# Patient Record
Sex: MALE | Race: WHITE | NOT HISPANIC OR LATINO
[De-identification: names, ages, dates, MRNs, and addresses within clinical notes are randomized per-mention and may not be internally consistent; named-entity substitution may affect disease eponyms.]

---

## 2018-04-12 ENCOUNTER — APPOINTMENT (OUTPATIENT)
Dept: UROLOGY | Facility: CLINIC | Age: 83
End: 2018-04-12
Payer: MEDICARE

## 2018-04-12 VITALS
BODY MASS INDEX: 29.95 KG/M2 | WEIGHT: 169 LBS | RESPIRATION RATE: 15 BRPM | SYSTOLIC BLOOD PRESSURE: 136 MMHG | HEART RATE: 71 BPM | DIASTOLIC BLOOD PRESSURE: 73 MMHG | HEIGHT: 63 IN

## 2018-04-12 DIAGNOSIS — R97.20 ELEVATED PROSTATE, SPECIFIC ANTIGEN [PSA]: ICD-10-CM

## 2018-04-12 PROCEDURE — 99204 OFFICE O/P NEW MOD 45 MIN: CPT

## 2018-04-12 RX ORDER — IRBESARTAN AND HYDROCHLOROTHIAZIDE 150; 12.5 MG/1; MG/1
150-12.5 TABLET, FILM COATED ORAL
Refills: 0 | Status: ACTIVE | COMMUNITY

## 2018-04-12 RX ORDER — TAMSULOSIN HYDROCHLORIDE 0.4 MG/1
0.4 CAPSULE ORAL
Qty: 90 | Refills: 3 | Status: DISCONTINUED | OUTPATIENT
Start: 2018-04-12 | End: 2018-04-12

## 2018-04-12 RX ORDER — ALFUZOSIN HYDROCHLORIDE 10 MG/1
10 TABLET, EXTENDED RELEASE ORAL
Qty: 90 | Refills: 3 | Status: ACTIVE | COMMUNITY
Start: 2018-04-12 | End: 1900-01-01

## 2018-04-12 RX ORDER — OMEPRAZOLE 40 MG/1
CAPSULE, DELAYED RELEASE ORAL
Refills: 0 | Status: ACTIVE | COMMUNITY

## 2018-04-13 PROBLEM — R97.20 PSA ELEVATION: Status: ACTIVE | Noted: 2018-04-13

## 2018-04-13 LAB
PSA FREE FLD-MCNC: 16.8
PSA FREE SERPL-MCNC: 3.98 NG/ML
PSA SERPL-MCNC: 23.74 NG/ML

## 2018-11-28 ENCOUNTER — TRANSCRIPTION ENCOUNTER (OUTPATIENT)
Age: 83
End: 2018-11-28

## 2018-11-29 ENCOUNTER — RESULT REVIEW (OUTPATIENT)
Age: 83
End: 2018-11-29

## 2018-11-29 ENCOUNTER — INPATIENT (INPATIENT)
Facility: HOSPITAL | Age: 83
LOS: 6 days | Discharge: NURSING, REHAB OR LONG-TERM CARE | DRG: 419 | End: 2018-12-06
Attending: SURGERY | Admitting: SURGERY
Payer: MEDICARE

## 2018-11-29 VITALS
SYSTOLIC BLOOD PRESSURE: 183 MMHG | DIASTOLIC BLOOD PRESSURE: 70 MMHG | HEART RATE: 80 BPM | TEMPERATURE: 98 F | OXYGEN SATURATION: 96 % | RESPIRATION RATE: 30 BRPM

## 2018-11-29 DIAGNOSIS — K81.9 CHOLECYSTITIS, UNSPECIFIED: ICD-10-CM

## 2018-11-29 DIAGNOSIS — E11.9 TYPE 2 DIABETES MELLITUS WITHOUT COMPLICATIONS: ICD-10-CM

## 2018-11-29 DIAGNOSIS — I10 ESSENTIAL (PRIMARY) HYPERTENSION: ICD-10-CM

## 2018-11-29 LAB
ALBUMIN SERPL ELPH-MCNC: 4.2 G/DL — SIGNIFICANT CHANGE UP (ref 3.3–5)
ALP SERPL-CCNC: 88 U/L — SIGNIFICANT CHANGE UP (ref 40–120)
ALT FLD-CCNC: 13 U/L — SIGNIFICANT CHANGE UP (ref 10–45)
ANION GAP SERPL CALC-SCNC: 16 MMOL/L — SIGNIFICANT CHANGE UP (ref 5–17)
APTT BLD: 30.9 SEC — SIGNIFICANT CHANGE UP (ref 27.5–36.3)
AST SERPL-CCNC: 12 U/L — SIGNIFICANT CHANGE UP (ref 10–40)
BASE EXCESS BLDV CALC-SCNC: -1.1 MMOL/L — SIGNIFICANT CHANGE UP (ref -2–2)
BASOPHILS # BLD AUTO: 0 K/UL — SIGNIFICANT CHANGE UP (ref 0–0.2)
BASOPHILS NFR BLD AUTO: 0.5 % — SIGNIFICANT CHANGE UP (ref 0–2)
BILIRUB SERPL-MCNC: 0.8 MG/DL — SIGNIFICANT CHANGE UP (ref 0.2–1.2)
BUN SERPL-MCNC: 23 MG/DL — SIGNIFICANT CHANGE UP (ref 7–23)
CA-I SERPL-SCNC: 1.2 MMOL/L — SIGNIFICANT CHANGE UP (ref 1.12–1.3)
CALCIUM SERPL-MCNC: 9.7 MG/DL — SIGNIFICANT CHANGE UP (ref 8.4–10.5)
CHLORIDE BLDV-SCNC: 110 MMOL/L — HIGH (ref 96–108)
CHLORIDE SERPL-SCNC: 103 MMOL/L — SIGNIFICANT CHANGE UP (ref 96–108)
CO2 BLDV-SCNC: 24 MMOL/L — SIGNIFICANT CHANGE UP (ref 22–30)
CO2 SERPL-SCNC: 19 MMOL/L — LOW (ref 22–31)
CREAT SERPL-MCNC: 1.09 MG/DL — SIGNIFICANT CHANGE UP (ref 0.5–1.3)
EOSINOPHIL # BLD AUTO: 0.1 K/UL — SIGNIFICANT CHANGE UP (ref 0–0.5)
EOSINOPHIL NFR BLD AUTO: 0.6 % — SIGNIFICANT CHANGE UP (ref 0–6)
GAS PNL BLDV: 133 MMOL/L — LOW (ref 136–145)
GAS PNL BLDV: SIGNIFICANT CHANGE UP
GLUCOSE BLDC GLUCOMTR-MCNC: 180 MG/DL — HIGH (ref 70–99)
GLUCOSE BLDV-MCNC: 155 MG/DL — HIGH (ref 70–99)
GLUCOSE SERPL-MCNC: 148 MG/DL — HIGH (ref 70–99)
HCO3 BLDV-SCNC: 23 MMOL/L — SIGNIFICANT CHANGE UP (ref 21–29)
HCT VFR BLD CALC: 40.5 % — SIGNIFICANT CHANGE UP (ref 39–50)
HCT VFR BLDA CALC: 43 % — SIGNIFICANT CHANGE UP (ref 39–50)
HGB BLD CALC-MCNC: 14 G/DL — SIGNIFICANT CHANGE UP (ref 13–17)
HGB BLD-MCNC: 13.8 G/DL — SIGNIFICANT CHANGE UP (ref 13–17)
INR BLD: 0.97 RATIO — SIGNIFICANT CHANGE UP (ref 0.88–1.16)
LACTATE BLDV-MCNC: 1.9 MMOL/L — SIGNIFICANT CHANGE UP (ref 0.7–2)
LIDOCAIN IGE QN: 18 U/L — SIGNIFICANT CHANGE UP (ref 7–60)
LYMPHOCYTES # BLD AUTO: 1.4 K/UL — SIGNIFICANT CHANGE UP (ref 1–3.3)
LYMPHOCYTES # BLD AUTO: 15.6 % — SIGNIFICANT CHANGE UP (ref 13–44)
MCHC RBC-ENTMCNC: 28.1 PG — SIGNIFICANT CHANGE UP (ref 27–34)
MCHC RBC-ENTMCNC: 34.1 GM/DL — SIGNIFICANT CHANGE UP (ref 32–36)
MCV RBC AUTO: 82.6 FL — SIGNIFICANT CHANGE UP (ref 80–100)
MONOCYTES # BLD AUTO: 0.7 K/UL — SIGNIFICANT CHANGE UP (ref 0–0.9)
MONOCYTES NFR BLD AUTO: 7.5 % — SIGNIFICANT CHANGE UP (ref 2–14)
NEUTROPHILS # BLD AUTO: 6.6 K/UL — SIGNIFICANT CHANGE UP (ref 1.8–7.4)
NEUTROPHILS NFR BLD AUTO: 75.8 % — SIGNIFICANT CHANGE UP (ref 43–77)
OTHER CELLS CSF MANUAL: 12 ML/DL — LOW (ref 18–22)
PCO2 BLDV: 39 MMHG — SIGNIFICANT CHANGE UP (ref 35–50)
PH BLDV: 7.39 — SIGNIFICANT CHANGE UP (ref 7.35–7.45)
PLATELET # BLD AUTO: 148 K/UL — LOW (ref 150–400)
PO2 BLDV: 34 MMHG — SIGNIFICANT CHANGE UP (ref 25–45)
POTASSIUM BLDV-SCNC: 3.6 MMOL/L — SIGNIFICANT CHANGE UP (ref 3.5–5.3)
POTASSIUM SERPL-MCNC: 3.8 MMOL/L — SIGNIFICANT CHANGE UP (ref 3.5–5.3)
POTASSIUM SERPL-SCNC: 3.8 MMOL/L — SIGNIFICANT CHANGE UP (ref 3.5–5.3)
PROT SERPL-MCNC: 7 G/DL — SIGNIFICANT CHANGE UP (ref 6–8.3)
PROTHROM AB SERPL-ACNC: 11.2 SEC — SIGNIFICANT CHANGE UP (ref 10–12.9)
RBC # BLD: 4.9 M/UL — SIGNIFICANT CHANGE UP (ref 4.2–5.8)
RBC # FLD: 13.4 % — SIGNIFICANT CHANGE UP (ref 10.3–14.5)
SAO2 % BLDV: 62 % — LOW (ref 67–88)
SODIUM SERPL-SCNC: 138 MMOL/L — SIGNIFICANT CHANGE UP (ref 135–145)
WBC # BLD: 8.8 K/UL — SIGNIFICANT CHANGE UP (ref 3.8–10.5)
WBC # FLD AUTO: 8.8 K/UL — SIGNIFICANT CHANGE UP (ref 3.8–10.5)

## 2018-11-29 PROCEDURE — 99285 EMERGENCY DEPT VISIT HI MDM: CPT | Mod: GC

## 2018-11-29 PROCEDURE — 88304 TISSUE EXAM BY PATHOLOGIST: CPT | Mod: 26

## 2018-11-29 PROCEDURE — 76705 ECHO EXAM OF ABDOMEN: CPT | Mod: 26

## 2018-11-29 PROCEDURE — 74177 CT ABD & PELVIS W/CONTRAST: CPT | Mod: 26

## 2018-11-29 RX ORDER — DEXTROSE 50 % IN WATER 50 %
25 SYRINGE (ML) INTRAVENOUS ONCE
Qty: 0 | Refills: 0 | Status: DISCONTINUED | OUTPATIENT
Start: 2018-11-29 | End: 2018-11-30

## 2018-11-29 RX ORDER — SODIUM CHLORIDE 9 MG/ML
1000 INJECTION, SOLUTION INTRAVENOUS
Qty: 0 | Refills: 0 | Status: DISCONTINUED | OUTPATIENT
Start: 2018-11-29 | End: 2018-11-29

## 2018-11-29 RX ORDER — ACETAMINOPHEN 500 MG
650 TABLET ORAL EVERY 6 HOURS
Qty: 0 | Refills: 0 | Status: DISCONTINUED | OUTPATIENT
Start: 2018-11-29 | End: 2018-12-01

## 2018-11-29 RX ORDER — HYDROMORPHONE HYDROCHLORIDE 2 MG/ML
0.25 INJECTION INTRAMUSCULAR; INTRAVENOUS; SUBCUTANEOUS
Qty: 0 | Refills: 0 | Status: DISCONTINUED | OUTPATIENT
Start: 2018-11-29 | End: 2018-11-29

## 2018-11-29 RX ORDER — PANTOPRAZOLE SODIUM 20 MG/1
40 TABLET, DELAYED RELEASE ORAL
Qty: 0 | Refills: 0 | Status: DISCONTINUED | OUTPATIENT
Start: 2018-11-29 | End: 2018-11-29

## 2018-11-29 RX ORDER — SODIUM CHLORIDE 9 MG/ML
1000 INJECTION, SOLUTION INTRAVENOUS
Qty: 0 | Refills: 0 | Status: DISCONTINUED | OUTPATIENT
Start: 2018-11-29 | End: 2018-11-30

## 2018-11-29 RX ORDER — ACETAMINOPHEN 500 MG
1000 TABLET ORAL ONCE
Qty: 0 | Refills: 0 | Status: COMPLETED | OUTPATIENT
Start: 2018-11-29 | End: 2018-11-29

## 2018-11-29 RX ORDER — INSULIN LISPRO 100/ML
VIAL (ML) SUBCUTANEOUS
Qty: 0 | Refills: 0 | Status: DISCONTINUED | OUTPATIENT
Start: 2018-11-29 | End: 2018-12-03

## 2018-11-29 RX ORDER — DEXTROSE 50 % IN WATER 50 %
15 SYRINGE (ML) INTRAVENOUS ONCE
Qty: 0 | Refills: 0 | Status: DISCONTINUED | OUTPATIENT
Start: 2018-11-29 | End: 2018-11-30

## 2018-11-29 RX ORDER — SODIUM CHLORIDE 9 MG/ML
3 INJECTION INTRAMUSCULAR; INTRAVENOUS; SUBCUTANEOUS ONCE
Qty: 0 | Refills: 0 | Status: COMPLETED | OUTPATIENT
Start: 2018-11-29 | End: 2018-11-29

## 2018-11-29 RX ORDER — CEFOTETAN DISODIUM 1 G
2 VIAL (EA) INJECTION EVERY 12 HOURS
Qty: 0 | Refills: 0 | Status: DISCONTINUED | OUTPATIENT
Start: 2018-11-29 | End: 2018-11-29

## 2018-11-29 RX ORDER — HEPARIN SODIUM 5000 [USP'U]/ML
5000 INJECTION INTRAVENOUS; SUBCUTANEOUS EVERY 8 HOURS
Qty: 0 | Refills: 0 | Status: DISCONTINUED | OUTPATIENT
Start: 2018-11-29 | End: 2018-12-06

## 2018-11-29 RX ORDER — DEXTROSE 50 % IN WATER 50 %
12.5 SYRINGE (ML) INTRAVENOUS ONCE
Qty: 0 | Refills: 0 | Status: DISCONTINUED | OUTPATIENT
Start: 2018-11-29 | End: 2018-11-30

## 2018-11-29 RX ORDER — MORPHINE SULFATE 50 MG/1
2 CAPSULE, EXTENDED RELEASE ORAL ONCE
Qty: 0 | Refills: 0 | Status: DISCONTINUED | OUTPATIENT
Start: 2018-11-29 | End: 2018-11-29

## 2018-11-29 RX ORDER — CEFOTETAN DISODIUM 1 G
2 VIAL (EA) INJECTION EVERY 12 HOURS
Qty: 0 | Refills: 0 | Status: COMPLETED | OUTPATIENT
Start: 2018-11-30 | End: 2018-12-03

## 2018-11-29 RX ORDER — CEFOTETAN DISODIUM 1 G
2 VIAL (EA) INJECTION ONCE
Qty: 0 | Refills: 0 | Status: COMPLETED | OUTPATIENT
Start: 2018-11-29 | End: 2018-11-29

## 2018-11-29 RX ORDER — GLUCAGON INJECTION, SOLUTION 0.5 MG/.1ML
1 INJECTION, SOLUTION SUBCUTANEOUS ONCE
Qty: 0 | Refills: 0 | Status: DISCONTINUED | OUTPATIENT
Start: 2018-11-29 | End: 2018-12-03

## 2018-11-29 RX ORDER — HEPARIN SODIUM 5000 [USP'U]/ML
5000 INJECTION INTRAVENOUS; SUBCUTANEOUS EVERY 8 HOURS
Qty: 0 | Refills: 0 | Status: DISCONTINUED | OUTPATIENT
Start: 2018-11-29 | End: 2018-11-29

## 2018-11-29 RX ADMIN — HEPARIN SODIUM 5000 UNIT(S): 5000 INJECTION INTRAVENOUS; SUBCUTANEOUS at 22:47

## 2018-11-29 RX ADMIN — Medication 1: at 18:50

## 2018-11-29 RX ADMIN — Medication 1000 MILLIGRAM(S): at 07:18

## 2018-11-29 RX ADMIN — SODIUM CHLORIDE 3 MILLILITER(S): 9 INJECTION INTRAMUSCULAR; INTRAVENOUS; SUBCUTANEOUS at 06:09

## 2018-11-29 RX ADMIN — Medication 400 MILLIGRAM(S): at 06:37

## 2018-11-29 RX ADMIN — Medication 100 GRAM(S): at 11:09

## 2018-11-29 RX ADMIN — MORPHINE SULFATE 2 MILLIGRAM(S): 50 CAPSULE, EXTENDED RELEASE ORAL at 07:18

## 2018-11-29 RX ADMIN — SODIUM CHLORIDE 50 MILLILITER(S): 9 INJECTION, SOLUTION INTRAVENOUS at 17:56

## 2018-11-29 RX ADMIN — MORPHINE SULFATE 2 MILLIGRAM(S): 50 CAPSULE, EXTENDED RELEASE ORAL at 08:00

## 2018-11-29 RX ADMIN — SODIUM CHLORIDE 50 MILLILITER(S): 9 INJECTION, SOLUTION INTRAVENOUS at 22:48

## 2018-11-29 NOTE — ED ADULT NURSE NOTE - CHPI ED NUR SYMPTOMS NEG
no blood in stool/no chills/no fever/no hematuria/no diarrhea/no dysuria/no burning urination/no vomiting

## 2018-11-29 NOTE — ED PROVIDER NOTE - MEDICAL DECISION MAKING DETAILS
-Lipase levels wnl  -U/S shows gallbladder wall thickening and stones -Lipase levels wnl  -U/S shows gallbladder wall thickening and stones  Mary: 89 year old male with history of gallstone pancreatitis c/o severe epigastric/ruq pain x 1 day. + nausea, no vomiting. will get labs, ruq u/s, ct a/p, pain control, ivf, reassess

## 2018-11-29 NOTE — CONSULT NOTE ADULT - SUBJECTIVE AND OBJECTIVE BOX
CHIEF COMPLAINT:    HISTORY OF PRESENT ILLNESS:    89M h/o gallstone pancreatitis 2009 who presents with 24 hours of abdominal pain, PO intolerance and subsequent ultrasound and CT imaging showing acute cholecystitis w/stones lodged in the neck. Non-ill appearing, RUQ tenderness on exam. Afebrile, non-leukocytotic. No transaminitis, hyperbilirubinemia or elevated alkaline phosphatase. Last meal was >12 hours prior to admission. H/o gastrectomy for gastric ulcer ~60-70 years prior; well-healed large midline incision.     He is relatively active and is able to walk two flights of stairs without becoming short of breath. Denies current chest pain or shortness of breath.    No previous cardiac issues of any kind.       PAST MEDICAL & SURGICAL HISTORY:  Acute Pancreatitis  Diabetes Mellitus Type II  Age Related Macular Degeneration  Essential (Primary) Hypertension  S/P Gastrectomy  Injury of Superficial Femoral Artery: repaired  S/P Tonsillectomy  S/P Exploratory Laparotomy: reportedly for gastric ulcer, unknown procedure  Essential (Primary) Hypertension          MEDICATIONS:                  FAMILY HISTORY:  No pertinent family history in first degree relatives      SOCIAL HISTORY:    [ ] Non-smoker  [ ] Smoker  [ ] Alcohol    Allergies    No Known Allergies    Intolerances    	    REVIEW OF SYSTEMS:  CONSTITUTIONAL: No fever, weight loss, + fatigue  EYES: No eye pain, +visual disturbances, or discharge  ENMT:  No difficulty hearing, tinnitus, vertigo; No sinus or throat pain  NECK: No pain or stiffness  RESPIRATORY: No cough, wheezing, chills or hemoptysis; No Shortness of Breath  CARDIOVASCULAR: No chest pain, palpitations, passing out, dizziness, or leg swelling  GASTROINTESTINAL:+abdominal or epigastric pain. No nausea, vomiting, or hematemesis; No diarrhea or constipation. No melena or hematochezia.  GENITOURINARY: No dysuria, frequency, hematuria, or incontinence  NEUROLOGICAL: No headaches, memory loss, loss of strength, numbness, or tremors  SKIN: No itching, burning, rashes, or lesions   LYMPH Nodes: No enlarged glands  ENDOCRINE: No heat or cold intolerance; No hair loss  MUSCULOSKELETAL:+ joint pain or swelling; No muscle, back, or extremity pain  PSYCHIATRIC: No depression, anxiety, mood swings, or difficulty sleeping  HEME/LYMPH: No easy bruising, or bleeding gums  ALLERY AND IMMUNOLOGIC: No hives or eczema	    [ ] All others negative	  [ ] Unable to obtain    PHYSICAL EXAM:  T(C): 36.9 (11-29-18 @ 07:14), Max: 36.9 (11-29-18 @ 07:14)  HR: 63 (11-29-18 @ 07:14) (63 - 80)  BP: 146/75 (11-29-18 @ 07:14) (146/75 - 183/70)  RR: 20 (11-29-18 @ 07:14) (19 - 30)  SpO2: 95% (11-29-18 @ 07:14) (95% - 96%)  Wt(kg): --  I&O's Summary      Appearance: Normal	  HEENT:   Normal oral mucosa, PERRL, EOMI	  Lymphatic: No lymphadenopathy  Cardiovascular: Normal S1 S2, No JVD, No murmurs, No edema  Respiratory: Lungs clear to auscultation	  Psychiatry: A & O x 3, Mood & affect appropriate  Gastrointestinal:  Soft, +Tender, + BS	  Skin: No rashes, No ecchymoses, No cyanosis	  Neurologic: Non-focal  Extremities: Normal range of motion, No clubbing, cyanosis or edema  Vascular: Peripheral pulses palpable 2+ bilaterally    TELEMETRY: 	    ECG:  	    < from: US Abdomen Limited (ED) (11.29.18 @ 07:39) >    Procedure was performed in the Emergency Department by a credentialed   Emergency Medicine Attending Physician    EXAM:  ER US ABDOMEN LTD      ORDER COMMENTS:      PROCEDURE DATE:  11/29/2018    FOCUSED ED ULTRASOUND REPORT          INTERPRETATION:  Grayscale imaging of the right upper quadrant was   performed.    The gallbladder was visualized and scanned in longitudinal and transverse   planes.  The anterior gallbladder wall measured  .058 cm.  The common bile duct measured 0.95 cm.  Gallstones present.  Sludge present.  Pericholecystic fluid not present.  Gallbladder wall edema present.  Sonographic Sorensen's sign present.  Multiple gallstones visualized at gallbladder neck.     IMPRESSION:Cholecystitis                JAMES DOAN ATTENDING EM PHYSICIAN  This document has been electronically signed. Nov 29 2018  7:40AM            < end of copied text >    RADIOLOGY:  < from: CT Abdomen and Pelvis w/ IV Cont (11.29.18 @ 08:51) >    EXAM:  CT ABDOMEN AND PELVIS IC                            PROCEDURE DATE:  11/29/2018            INTERPRETATION:  CLINICAL INFORMATION: Abdominal pain    COMPARISON: CT abdomen pelvis 12/22/2009. Abdominal ultrasound 11/29/2018    PROCEDURE:   CT of the Abdomen and Pelvis was performed with intravenous contrast.   Intravenous contrast: 90 ml Omnipaque 350. 10 ml discarded.  Oral contrast: None.  Sagittal and coronal reformats were performed.    FINDINGS:    LOWER CHEST: Bibasilar subsegmental atelectasis. Coronary artery   calcifications.    LIVER: Calcified granulomas.  BILE DUCTS: Mild intrahepatic ductal dilatation. CBD is normal in size   for age.  GALLBLADDER: Dilated containing stones and sludge. There is adjacent fat   stranding.  SPLEEN: Within normal limits.  PANCREAS: Within normal limits.  ADRENALS: Within normal limits.  KIDNEYS/URETERS: Bilateral renal cysts. Subcentimeter hypodense lesions   within the bilateral kidneys too small to characterize. No hydronephrosis.    BLADDER: Within normal limits.  REPRODUCTIVE ORGANS: The prostate is enlarged.    BOWEL: No bowel obstruction. Appendix is not visualized. Colonic   diverticulosis.  PERITONEUM: No ascites.  VESSELS:  Atherosclerotic calcifications of the aorta and its branches.  RETROPERITONEUM: No lymphadenopathy.    ABDOMINAL WALL: Within normal limits.  BONES: Degenerative changes of the spine. Chronic compression deformity   of the L1 vertebral body.    IMPRESSION:     Dilated gallbladder containing stones and sludge with adjacent fat   infiltration suspicious for acute cholecystitis.                    KARY QUEZADA M.D., RADIOLOGY RESIDENT  This document has been electronically signed.  GHANSHYAM WILCOX M.D., ATTENDING RADIOLOGIST  This document has been electronically signed. Nov 29 2018 10:18AM                < end of copied text >    OTHER: 	  	  LABS:	 	    CARDIAC MARKERS:                                  13.8   8.8   )-----------( 148      ( 29 Nov 2018 06:08 )             40.5     11-29    138  |  103  |  23  ----------------------------<  148<H>  3.8   |  19<L>  |  1.09    Ca    9.7      29 Nov 2018 06:08    TPro  7.0  /  Alb  4.2  /  TBili  0.8  /  DBili  x   /  AST  12  /  ALT  13  /  AlkPhos  88  11-29    proBNP:   Lipid Profile:   HgA1c:   TSH:

## 2018-11-29 NOTE — H&P ADULT - ASSESSMENT
89M w/acute cholecystitis    - patient has acute cholecystitis on clinical and radiological exam  - admit to surgical service, Red Team, under Dr. Davion Mendez  - plan for laparoscopic cholecystectomy   - IV antibiosis: cefotetan 2g  - NPO w/IVFs  - cardiology clearance  - consent is signed (by his daughter) and in the chart  - above plan d/w Dr. Davion Mendez    [facility] PGY3  [phone]

## 2018-11-29 NOTE — ED PROVIDER NOTE - OBJECTIVE STATEMENT
Pt is a 89yM with a PMH of gallstone pancreatitis in 2009 (with failed ERCP and no further intervention), macular degeneration, bilateral knee replacement came in with a CC of abdominal pain and nausea for 1 day in duration. Pt states that he started feeling center-right sharp abdominal pain 1 hour after eating lunch. Pt states he did not vomit anything but did spit out clear mucus, was afebrile, did not feel any chest pain/palpitations or have any diaphoresis. Pt's pain went away at nighttime after averting food, and taking tylenol 500mg, but he awoke in the same abdominal pain in the middle of the night.

## 2018-11-29 NOTE — CONSULT NOTE ADULT - PROBLEM SELECTOR RECOMMENDATION 9
IV ABx   METS > 4, no anginal symptoms. No acute cardiac findings of physical exam.   Check EKG  May proceed with acceptable cardiac risk   Discussed with patient and his daughter at bedside

## 2018-11-29 NOTE — ED ADULT NURSE REASSESSMENT NOTE - NS ED NURSE REASSESS COMMENT FT1
Pharmacy called for Cefotetan order.
Red socks applied. Pt is in no current distress. Comfort and safety provided. Will continue to monitor. Awaiting CT.

## 2018-11-29 NOTE — ED PROVIDER NOTE - PSH
Essential (Primary) Hypertension    Injury of Superficial Femoral Artery  repaired  S/P Exploratory Laparotomy  reportedly for gastric ulcer, unknown procedure  S/P Gastrectomy    S/P Tonsillectomy

## 2018-11-29 NOTE — ED PROVIDER NOTE - PROGRESS NOTE DETAILS
Resident: patient signed out to me. Patient seen by surgery, accepted for admission for cholecystitis, will start antibiotics. Resident: patient signed out to me. Patient seen by surgery, accepted for admission for cholecystitis, will start antibiotics.    ATTENDING MD Caterina: agree with above

## 2018-11-29 NOTE — ED PROVIDER NOTE - PMH
Acute Pancreatitis    Age Related Macular Degeneration    Diabetes Mellitus Type II    Essential (Primary) Hypertension

## 2018-11-29 NOTE — H&P ADULT - HISTORY OF PRESENT ILLNESS
General Surgery  CC: abdominal pain, cholecystitis   HPI: 89M h/o gallstone pancreatitis 2009 who presents with 24 hours of abdominal pain, PO intolerance and subsequent ultrasound and CT imaging showing acute cholecystitis w/stones lodged in the neck. Non-ill appearing, RUQ tenderness on exam. Afebrile, non-leukocytotic. No transaminitis, hyperbilirubinemia or elevated alkaline phosphatase. Last meal was >12 hours prior to admission. H/o gastrectomy for gastric ulcer ~60-70 years prior; well-healed large midline incision.     Patient does not have a cardiologist. Can do two flights of stairs without becoming short of breath. Denies current chest pain or shortness of breath.        Medical and Surgical History  Acute Pancreatitis  Diabetes Mellitus Type II  Age Related Macular Degeneration  Essential (Primary) Hypertension  S/P Gastrectomy  Injury of Superficial Femoral Artery: repaired  S/P Tonsillectomy  S/P Exploratory Laparotomy: reportedly for gastric ulcer, unknown procedure  Essential (Primary) Hypertension

## 2018-11-29 NOTE — H&P ADULT - NSHPPHYSICALEXAM_GEN_ALL_CORE
Objective:   Vital Signs Last 24 Hrs  T(C): 36.9 (29 Nov 2018 07:14), Max: 36.9 (29 Nov 2018 07:14)  T(F): 98.4 (29 Nov 2018 07:14), Max: 98.4 (29 Nov 2018 07:14)  HR: 63 (29 Nov 2018 07:14) (63 - 80)  BP: 146/75 (29 Nov 2018 07:14) (146/75 - 183/70)  BP(mean): --  RR: 20 (29 Nov 2018 07:14) (19 - 30)  SpO2: 95% (29 Nov 2018 07:14) (95% - 96%)    Physical Exam:  General: Well developed, well nourished, alert and cooperative, and appears to be in no acute distress.  Chest: non-labored breathing, no wheezing or rhonci  Cardiac: Regular rhythm, rate of 64  Abdomen: +RUQ tenderness; soft, non-distended, no guarding or rebound

## 2018-11-29 NOTE — ED ADULT NURSE NOTE - NSIMPLEMENTINTERV_GEN_ALL_ED
Implemented All Fall Risk Interventions:  Christine to call system. Call bell, personal items and telephone within reach. Instruct patient to call for assistance. Room bathroom lighting operational. Non-slip footwear when patient is off stretcher. Physically safe environment: no spills, clutter or unnecessary equipment. Stretcher in lowest position, wheels locked, appropriate side rails in place. Provide visual cue, wrist band, yellow gown, etc. Monitor gait and stability. Monitor for mental status changes and reorient to person, place, and time. Review medications for side effects contributing to fall risk. Reinforce activity limits and safety measures with patient and family.

## 2018-11-29 NOTE — ED ADULT NURSE NOTE - OBJECTIVE STATEMENT
89y male presents to ED complaining of abdominal pain. Pt is a/ox3 with some confusion at baseline per family. Pt is primarily Nepalese speaking, pt family member agreeing to translate. Per family since yesterday evening patient developed lower abdominal pain 1 hour after eating. Pt took tylenol yesterday at 1900 with some relief, awoke again at 0400 with increased pain and nausea and told daughter he wanted to go to the hospital. PT is visibly uncomfortable, holding lower abdomen. Pt breathing spontaneous and unlabored with lungs clear to auscultation bilaterally. Pt skin is warm, dry and intact with no edema present. Pt abdomen soft, nontender, distended. Pt PERRL, with equal strength bilaterally in upper and lower extremities with full sensation. Pt denies chest pain and SOB, , denies fever/chills and cough, denies dysuria, denies numbness/tingling and weakness. 89y male presents to ED complaining of abdominal pain. Pt is a/ox3 with some confusion at baseline per family. Pt is primarily [place] Islander speaking, pt family member agreeing to translate. Per family since yesterday evening patient developed lower abdominal pain 1 hour after eating. Pt took Tylenol yesterday at 1900 with some relief, awoke again at 0400 with increased pain and nausea and told daughter he wanted to go to the hospital. PT is visibly uncomfortable, holding lower abdomen. Pt breathing spontaneous and unlabored with lungs clear to auscultation bilaterally. Pt skin is warm, dry and intact with no edema present. Pt abdomen soft, tender in lower quadants, distended. Pt PERRL, with equal strength bilaterally in upper and lower extremities with full sensation. Pt denies chest pain and SOB, , denies fever/chills and cough, denies dysuria, denies numbness/tingling and weakness.

## 2018-11-29 NOTE — H&P ADULT - NSHPLABSRESULTS_GEN_ALL_CORE
Vital Signs Last 24 Hrs  T(C): 36.9 (29 Nov 2018 07:14), Max: 36.9 (29 Nov 2018 07:14)  T(F): 98.4 (29 Nov 2018 07:14), Max: 98.4 (29 Nov 2018 07:14)  HR: 63 (29 Nov 2018 07:14) (63 - 80)  BP: 146/75 (29 Nov 2018 07:14) (146/75 - 183/70)  BP(mean): --  RR: 20 (29 Nov 2018 07:14) (19 - 30)  SpO2: 95% (29 Nov 2018 07:14) (95% - 96%)    I&O's Detail                            13.8   8.8   )-----------( 148      ( 29 Nov 2018 06:08 )             40.5       11-29    138  |  103  |  23  ----------------------------<  148<H>  3.8   |  19<L>  |  1.09    Ca    9.7      29 Nov 2018 06:08    TPro  7.0  /  Alb  4.2  /  TBili  0.8  /  DBili  x   /  AST  12  /  ALT  13  /  AlkPhos  88  11-29      CAPILLARY BLOOD GLUCOSE          LIVER FUNCTIONS - ( 29 Nov 2018 06:08 )  Alb: 4.2 g/dL / Pro: 7.0 g/dL / ALK PHOS: 88 U/L / ALT: 13 U/L / AST: 12 U/L / GGT: x             PT/INR - ( 29 Nov 2018 06:08 )   PT: 11.2 sec;   INR: 0.97 ratio         PTT - ( 29 Nov 2018 06:08 )  PTT:30.9 sec

## 2018-11-29 NOTE — ED PROVIDER NOTE - PHYSICAL EXAMINATION
General: No acute distress.  HEENT: NCAT.  PERRL.  EOMI.  No scleral icterus or injection.  Moist MM.  No oropharyngeal exudates.    Neck: Supple.  Full ROM.  No JVD.  No thyromegaly. No lymphadenopathy.   Heart: RRR.  Normal S1 and S2.  No murmurs, rubs, or gallops.   Lungs: CTAB. No wheezes, crackles, or rhonchi.    Abdomen: BS+, soft, nondistended, but RLQ tenderness, guarding.  Skin: Warm and dry.  No rashes.  Extremities: No edema, clubbing, or cyanosis.  2+ peripheral pulses b/l.  Musculoskeletal: No deformities.  No spinal or paraspinal tenderness.  Neuro: A&Ox3.  CN II-XII intact.  5/5 strength in UE and LE b/l.  Tactile sensation intact in UE and LE b/l.  Cerebellar function intact General: No acute distress.  HEENT: NCAT.  PERRL.  EOMI.  No scleral icterus or injection.  Moist MM.  No oropharyngeal exudates.    Neck: Supple.  Full ROM.  No JVD.  No thyromegaly. No lymphadenopathy.   Heart: RRR.  Normal S1 and S2.  No murmurs, rubs, or gallops.   Lungs: CTAB. No wheezes, crackles, or rhonchi.    Abdomen: BS+, soft, nondistended, ttp ruq, +epigastric pain   Skin: Warm and dry.  No rashes.  Extremities: No edema, clubbing, or cyanosis.  2+ peripheral pulses b/l.  Musculoskeletal: No deformities.  No spinal or paraspinal tenderness.  Neuro: A&Ox2.  CN II-XII intact.  5/5 strength in UE and LE b/l.  Tactile sensation intact in UE and LE b/l. General: mild distress  HEENT: NCAT.  PERRL.  EOMI.  No scleral icterus or injection.  Moist MM.  No oropharyngeal exudates.    Neck: Supple.  Full ROM.  No JVD.  No thyromegaly. No lymphadenopathy.   Heart: RRR.  Normal S1 and S2.  No murmurs, rubs, or gallops.   Lungs: CTAB. No wheezes, crackles, or rhonchi.    Abdomen: BS+, soft, nondistended, ttp ruq, +epigastric pain   Skin: Warm and dry.  No rashes.  Extremities: No edema, clubbing, or cyanosis.  2+ peripheral pulses b/l.  Musculoskeletal: No deformities.  No spinal or paraspinal tenderness.  Neuro: A&Ox2.  CN II-XII intact.  5/5 strength in UE and LE b/l.  Tactile sensation intact in UE and LE b/l.

## 2018-11-29 NOTE — H&P ADULT - NSHPREVIEWOFSYSTEMS_GEN_ALL_CORE
General: denies weight change, fever or fatigue  HEENT: denies sore throat, hoarseness  Respiratory: denies cough, shortness of breath at rest and on exertion, wheezing  Cardiovascular: denies chest pain, abnormal heart rhythm, PND, palpitations  Gastrointestinal: +upper abdominal pain R>L, denies nausea, vomiting, diarrhea, bloody or black bowel movements

## 2018-11-29 NOTE — CHART NOTE - NSCHARTNOTEFT_GEN_A_CORE
TEAM: Red    TIME:11-29-18 @ 23:28    PROCEDURE: lap saskia with SUKHWINDER    SUBJECTIVE:  Pt seen + examined at bedside. Pt is AOx3, pain well controlled.  SOB:  [] YES [x] NO  Dyspnea: []YES [x]NO  Chest Discomfort: [] YES [x] NO    Nausea: [] YES [x] NO           Vomiting: [] YES [x] NO  Constipation: [] YES [] NO     Pain (0-10):   3  Pain Control Adequate: [x] YES [] NO      OBJECTIVE:  T(C): 37.4 (11-29-18 @ 22:20), Max: 37.4 (11-29-18 @ 22:20)  HR: 85 (11-29-18 @ 22:20) (63 - 98)  BP: 117/67 (11-29-18 @ 22:20) (112/60 - 183/70)  RR: 18 (11-29-18 @ 22:20) (14 - 30)  SpO2: 94% (11-29-18 @ 22:20) (91% - 96%)    Weight (kg): 82 (11-29-18 @ 12:15)    IN/OUT:    11-29-18 @ 07:01  -  11-29-18 @ 23:28  --------------------------------------------------------  IN: 100 mL / OUT: 250 mL / NET: -150 mL        ---------------------------------------------------------------------------------------------   PHYSICAL EXAM:  General: NAD, Lying in bed comfortably  Resp: Good effort  GI/Abd: Soft, mildly distended, appropriately tender, no rebound/guarding, dressings covering incisions.  SS drainage in elizabeth drain and saturated gauze around drain insertion (no active leaking)  Musculoskeletal: All 4 extremities moving spontaneously    ---------------------------------------------------------------------------------------------   MEDICATIONS:  acetaminophen   Tablet .. 650 milliGRAM(s) Oral every 6 hours PRN  dextrose 40% Gel 15 Gram(s) Oral once PRN  dextrose 50% Injectable 12.5 Gram(s) IV Push once  dextrose 50% Injectable 25 Gram(s) IV Push once  dextrose 50% Injectable 25 Gram(s) IV Push once  glucagon  Injectable 1 milliGRAM(s) IntraMuscular once PRN  heparin  Injectable 5000 Unit(s) SubCutaneous every 8 hours  insulin lispro (HumaLOG) corrective regimen sliding scale   SubCutaneous three times a day before meals  lactated ringers. 1000 milliLiter(s) IV Continuous <Continuous>      ---------------------------------------------------------------------------------------------   LABS:                          13.8   8.8   )-----------( 148      ( 29 Nov 2018 06:08 )             40.5   11-29    138  |  103  |  23  ----------------------------<  148<H>  3.8   |  19<L>  |  1.09    Ca    9.7      29 Nov 2018 06:08    TPro  7.0  /  Alb  4.2  /  TBili  0.8  /  DBili  x   /  AST  12  /  ALT  13  /  AlkPhos  88  11-29        ---------------------------------------------------------------------------------------------       ASSESSMENT  89y male s/p lap cholecystectomy and SUKHWINDER    PLAN  - Diet: regular  - Pain control prn  - Continue home medications  - OOB, ambulate as tolerated  - continue chemical VTE ppx and mechanical VTE ppx  - Will discuss with attending.

## 2018-11-29 NOTE — BRIEF OPERATIVE NOTE - PROCEDURE
<<-----Click on this checkbox to enter Procedure Laparoscopic cholecystectomy  11/29/2018    Active  AGAINES

## 2018-11-30 LAB
ALBUMIN SERPL ELPH-MCNC: 3.7 G/DL — SIGNIFICANT CHANGE UP (ref 3.3–5)
ALP SERPL-CCNC: 69 U/L — SIGNIFICANT CHANGE UP (ref 40–120)
ALT FLD-CCNC: 35 U/L — SIGNIFICANT CHANGE UP (ref 10–45)
ANION GAP SERPL CALC-SCNC: 14 MMOL/L — SIGNIFICANT CHANGE UP (ref 5–17)
AST SERPL-CCNC: 33 U/L — SIGNIFICANT CHANGE UP (ref 10–40)
BILIRUB DIRECT SERPL-MCNC: 0.2 MG/DL — SIGNIFICANT CHANGE UP (ref 0–0.2)
BILIRUB SERPL-MCNC: 0.8 MG/DL — SIGNIFICANT CHANGE UP (ref 0.2–1.2)
BUN SERPL-MCNC: 20 MG/DL — SIGNIFICANT CHANGE UP (ref 7–23)
CALCIUM SERPL-MCNC: 9.1 MG/DL — SIGNIFICANT CHANGE UP (ref 8.4–10.5)
CHLORIDE SERPL-SCNC: 101 MMOL/L — SIGNIFICANT CHANGE UP (ref 96–108)
CO2 SERPL-SCNC: 22 MMOL/L — SIGNIFICANT CHANGE UP (ref 22–31)
CREAT SERPL-MCNC: 1.13 MG/DL — SIGNIFICANT CHANGE UP (ref 0.5–1.3)
GLUCOSE BLDC GLUCOMTR-MCNC: 106 MG/DL — HIGH (ref 70–99)
GLUCOSE BLDC GLUCOMTR-MCNC: 128 MG/DL — HIGH (ref 70–99)
GLUCOSE BLDC GLUCOMTR-MCNC: 148 MG/DL — HIGH (ref 70–99)
GLUCOSE BLDC GLUCOMTR-MCNC: 162 MG/DL — HIGH (ref 70–99)
GLUCOSE SERPL-MCNC: 118 MG/DL — HIGH (ref 70–99)
HBA1C BLD-MCNC: 6 % — HIGH (ref 4–5.6)
HCT VFR BLD CALC: 37.3 % — LOW (ref 39–50)
HGB BLD-MCNC: 12.6 G/DL — LOW (ref 13–17)
MCHC RBC-ENTMCNC: 27.8 PG — SIGNIFICANT CHANGE UP (ref 27–34)
MCHC RBC-ENTMCNC: 33.7 GM/DL — SIGNIFICANT CHANGE UP (ref 32–36)
MCV RBC AUTO: 82.5 FL — SIGNIFICANT CHANGE UP (ref 80–100)
PLATELET # BLD AUTO: 142 K/UL — LOW (ref 150–400)
POTASSIUM SERPL-MCNC: 4 MMOL/L — SIGNIFICANT CHANGE UP (ref 3.5–5.3)
POTASSIUM SERPL-SCNC: 4 MMOL/L — SIGNIFICANT CHANGE UP (ref 3.5–5.3)
PROT SERPL-MCNC: 6.6 G/DL — SIGNIFICANT CHANGE UP (ref 6–8.3)
RBC # BLD: 4.52 M/UL — SIGNIFICANT CHANGE UP (ref 4.2–5.8)
RBC # FLD: 13.5 % — SIGNIFICANT CHANGE UP (ref 10.3–14.5)
SODIUM SERPL-SCNC: 137 MMOL/L — SIGNIFICANT CHANGE UP (ref 135–145)
WBC # BLD: 11.3 K/UL — HIGH (ref 3.8–10.5)
WBC # FLD AUTO: 11.3 K/UL — HIGH (ref 3.8–10.5)

## 2018-11-30 RX ADMIN — Medication 100 GRAM(S): at 14:31

## 2018-11-30 RX ADMIN — HEPARIN SODIUM 5000 UNIT(S): 5000 INJECTION INTRAVENOUS; SUBCUTANEOUS at 05:07

## 2018-11-30 RX ADMIN — HEPARIN SODIUM 5000 UNIT(S): 5000 INJECTION INTRAVENOUS; SUBCUTANEOUS at 14:32

## 2018-11-30 RX ADMIN — HEPARIN SODIUM 5000 UNIT(S): 5000 INJECTION INTRAVENOUS; SUBCUTANEOUS at 21:15

## 2018-11-30 RX ADMIN — Medication 650 MILLIGRAM(S): at 14:32

## 2018-11-30 RX ADMIN — Medication 650 MILLIGRAM(S): at 14:07

## 2018-11-30 RX ADMIN — Medication 100 GRAM(S): at 01:50

## 2018-11-30 NOTE — CONSULT NOTE ADULT - ASSESSMENT
89 yo male ho dm, htn, a/w acute saskia    pt s/p lap saskia  POD#1  surg followup   pain control  incentive spirometry  ambulation  advance diet as tolerated  iv abx    DM  on insulin  monitor fs    dvt ppx

## 2018-11-30 NOTE — PHYSICAL THERAPY INITIAL EVALUATION ADULT - CRITERIA FOR SKILLED THERAPEUTIC INTERVENTIONS
functional limitations in following categories/impairments found/rehab potential/anticipated discharge recommendation

## 2018-11-30 NOTE — PHYSICAL THERAPY INITIAL EVALUATION ADULT - PRECAUTIONS/LIMITATIONS, REHAB EVAL
CT ABDOMEN/PELVIS: Dilated gallbladder containing stones and sludge with adjacent fat infiltration suspicious for acute cholecystitis. fall precautions/CT ABDOMEN/PELVIS: Dilated gallbladder containing stones and sludge with adjacent fat infiltration suspicious for acute cholecystitis.

## 2018-11-30 NOTE — PROGRESS NOTE ADULT - SUBJECTIVE AND OBJECTIVE BOX
Subjective: Patient seen and examined. No new events except as noted.   POD #1   pain controlled   tolerating PO     REVIEW OF SYSTEMS:    CONSTITUTIONAL: +weakness, fevers or chills  EYES/ENT: No visual changes;  No vertigo or throat pain   NECK: No pain or stiffness  RESPIRATORY: No cough, wheezing, hemoptysis; No shortness of breath  CARDIOVASCULAR: No chest pain or palpitations  GASTROINTESTINAL:+abdominal or epigastric pain. No nausea, vomiting, or hematemesis; No diarrhea or constipation. No melena or hematochezia.  GENITOURINARY: No dysuria, frequency or hematuria  NEUROLOGICAL: No numbness or weakness  SKIN: No itching, burning, rashes, or lesions   All other review of systems is negative unless indicated above.    MEDICATIONS:  MEDICATIONS  (STANDING):  cefoTEtan  IVPB 2 Gram(s) IV Intermittent every 12 hours  dextrose 50% Injectable 12.5 Gram(s) IV Push once  dextrose 50% Injectable 25 Gram(s) IV Push once  dextrose 50% Injectable 25 Gram(s) IV Push once  heparin  Injectable 5000 Unit(s) SubCutaneous every 8 hours  insulin lispro (HumaLOG) corrective regimen sliding scale   SubCutaneous three times a day before meals      PHYSICAL EXAM:  T(C): 36.6 (11-30-18 @ 08:27), Max: 37.4 (11-29-18 @ 22:20)  HR: 87 (11-30-18 @ 04:46) (77 - 98)  BP: 101/61 (11-30-18 @ 04:46) (101/61 - 165/75)  RR: 18 (11-30-18 @ 08:27) (14 - 19)  SpO2: 93% (11-30-18 @ 08:27) (91% - 96%)  Wt(kg): --  I&O's Summary    29 Nov 2018 07:01  -  30 Nov 2018 07:00  --------------------------------------------------------  IN: 100 mL / OUT: 725 mL / NET: -625 mL    30 Nov 2018 07:01  -  30 Nov 2018 12:52  --------------------------------------------------------  IN: 300 mL / OUT: 220 mL / NET: 80 mL          Appearance: Normal	  HEENT:   Normal oral mucosa, PERRL, EOMI	  Lymphatic: No lymphadenopathy , no edema  Cardiovascular: Normal S1 S2, No JVD, No murmurs , Peripheral pulses palpable 2+ bilaterally  Respiratory: Lungs clear to auscultation, normal effort 	  Gastrointestinal:  Soft, Mildly tender, + BS	  Skin: No rashes, No ecchymoses, No cyanosis, warm to touch  Musculoskeletal: Normal range of motion, normal strength  Psychiatry:  Mood & affect appropriate  Ext: No edema      LABS:    CARDIAC MARKERS:                                12.6   11.3  )-----------( 142      ( 30 Nov 2018 09:15 )             37.3     11-30    137  |  101  |  20  ----------------------------<  118<H>  4.0   |  22  |  1.13    Ca    9.1      30 Nov 2018 09:04    TPro  6.6  /  Alb  3.7  /  TBili  0.8  /  DBili  0.2  /  AST  33  /  ALT  35  /  AlkPhos  69  11-30    proBNP:   Lipid Profile:   HgA1c: Hemoglobin A1C, Whole Blood: 6.0 % (11-30 @ 10:01)    TSH:             TELEMETRY: 	    ECG:  	  RADIOLOGY:   DIAGNOSTIC TESTING:  [ ] Echocardiogram:  [ ]  Catheterization:  [ ] Stress Test:    OTHER:

## 2018-11-30 NOTE — PROGRESS NOTE ADULT - SUBJECTIVE AND OBJECTIVE BOX
[facility]  Genaro Mello PA-C  [address] [place] [address]  [phone]      INTERVAL HPI/OVERNIGHT EVENTS:    post cholecystectomy  tolerating diet    MEDICATIONS  (STANDING):  cefoTEtan  IVPB 2 Gram(s) IV Intermittent every 12 hours  heparin  Injectable 5000 Unit(s) SubCutaneous every 8 hours  insulin lispro (HumaLOG) corrective regimen sliding scale   SubCutaneous three times a day before meals    MEDICATIONS  (PRN):  acetaminophen   Tablet .. 650 milliGRAM(s) Oral every 6 hours PRN Mild Pain (1 - 3)  glucagon  Injectable 1 milliGRAM(s) IntraMuscular once PRN Glucose LESS THAN 70 milligrams/deciliter      Allergies    No Known Allergies    Intolerances        ROS:   General:  No wt loss, fevers, chills, night sweats, fatigue,   Eyes:  Good vision, no reported pain  ENT:  No sore throat, pain, runny nose, dysphagia  CV:  No pain, palpitations, hypo/hypertension  Resp:  No dyspnea, cough, tachypnea, wheezing  GI:  No pain, No nausea, No vomiting, No diarrhea, No constipation, No weight loss, No fever, No pruritis, No rectal bleeding, No tarry stools, No dysphagia,  :  No pain, bleeding, incontinence, nocturia  Muscle:  No pain, weakness  Neuro:  No weakness, tingling, memory problems  Psych:  No fatigue, insomnia, mood problems, depression  Endocrine:  No polyuria, polydipsia, cold/heat intolerance  Heme:  No petechiae, ecchymosis, easy bruisability  Skin:  No rash, tattoos, scars, edema      PHYSICAL EXAM:   Vital Signs:  Vital Signs Last 24 Hrs  T(C): 36.9 (30 Nov 2018 17:01), Max: 37.4 (29 Nov 2018 22:20)  T(F): 98.5 (30 Nov 2018 17:01), Max: 99.3 (29 Nov 2018 22:20)  HR: 69 (30 Nov 2018 17:01) (69 - 101)  BP: 128/64 (30 Nov 2018 17:01) (101/61 - 153/78)  BP(mean): --  RR: 18 (30 Nov 2018 17:01) (14 - 19)  SpO2: 94% (30 Nov 2018 17:01) (91% - 95%)  Daily     Daily     GENERAL:  Appears stated age, well-groomed, well-nourished, no distress  HEENT:  NC/AT,  conjunctivae clear and pink, no thyromegaly, nodules, adenopathy, no JVD, sclera -anicteric  CHEST:  Full & symmetric excursion, no increased effort, breath sounds clear  HEART:  Regular rhythm, S1, S2, no murmur/rub/S3/S4, no abdominal bruit, no edema  ABDOMEN:  Soft, non-tender, non-distended, normoactive bowel sounds,  no masses ,no hepato-splenomegaly, no signs of chronic liver disease  EXTEREMITIES:  no cyanosis,clubbing or edema  SKIN:  No rash/erythema/ecchymoses/petechiae/wounds/abscess/warm/dry  NEURO:  Alert, oriented, no asterixis, no tremor, no encephalopathy      LABS:                        12.6   11.3  )-----------( 142      ( 30 Nov 2018 09:15 )             37.3     11-30    137  |  101  |  20  ----------------------------<  118<H>  4.0   |  22  |  1.13    Ca    9.1      30 Nov 2018 09:04    TPro  6.6  /  Alb  3.7  /  TBili  0.8  /  DBili  0.2  /  AST  33  /  ALT  35  /  AlkPhos  69  11-30    PT/INR - ( 29 Nov 2018 06:08 )   PT: 11.2 sec;   INR: 0.97 ratio         PTT - ( 29 Nov 2018 06:08 )  PTT:30.9 sec      RADIOLOGY & ADDITIONAL TESTS:

## 2018-11-30 NOTE — PHYSICAL THERAPY INITIAL EVALUATION ADULT - ADDITIONAL COMMENTS
Pt unable to provide hx, hx obtained from care coordination notes; pt resides in private home with dtr, ambulatory with cane

## 2018-11-30 NOTE — CONSULT NOTE ADULT - SUBJECTIVE AND OBJECTIVE BOX
CHRISTOPH SCHROEDER  89y Male  MRN:[id number]    Patient is a 89y old  Male who presents with a chief complaint of acute cholecystitis (30 Nov 2018 08:31)    CC: abdominal pain, cholecystitis   HPI: 89M h/o gallstone pancreatitis 2009 who presents with 24 hours of abdominal pain, PO intolerance and subsequent ultrasound and CT imaging showing acute cholecystitis w/stones lodged in the neck. Non-ill appearing, RUQ tenderness on exam. Afebrile, non-leukocytotic. No transaminitis, hyperbilirubinemia or elevated alkaline phosphatase. Last meal was >12 hours prior to admission. H/o gastrectomy for gastric ulcer ~60-70 years prior; well-healed large midline incision.     Patient does not have a cardiologist. Can do two flights of stairs without becoming short of breath. Denies current chest pain or shortness of breath.        Medical and Surgical History  Acute Pancreatitis  Diabetes Mellitus Type II  Age Related Macular Degeneration  Essential (Primary) Hypertension  S/P Gastrectomy  Injury of Superficial Femoral Artery: repaired  S/P Tonsillectomy  S/P Exploratory Laparotomy: reportedly for gastric ulcer, unknown procedure  Essential (Primary) Hypertension (29 Nov 2018 10:28)      Patient seen and evaluated at bedside. No acute events overnight except as noted.    Interval HPI: s/p lap saskia    PAST MEDICAL & SURGICAL HISTORY:  Acute Pancreatitis  Diabetes Mellitus Type II  Age Related Macular Degeneration  Essential (Primary) Hypertension  S/P Gastrectomy  Injury of Superficial Femoral Artery: repaired  S/P Tonsillectomy  S/P Exploratory Laparotomy: reportedly for gastric ulcer, unknown procedure  Essential (Primary) Hypertension    SOC:  non smoker  no drug or alcohol abuse    fam hx:  non cont    REVIEW OF SYSTEMS:  as per hpi    VITALS:  Vital Signs Last 24 Hrs  T(C): 36.6 (30 Nov 2018 08:27), Max: 37.4 (29 Nov 2018 22:20)  T(F): 97.9 (30 Nov 2018 08:27), Max: 99.3 (29 Nov 2018 22:20)  HR: 87 (30 Nov 2018 04:46) (77 - 98)  BP: 101/61 (30 Nov 2018 04:46) (101/61 - 165/75)  BP(mean): 82 (29 Nov 2018 18:45) (82 - 108)  RR: 18 (30 Nov 2018 08:27) (14 - 19)  SpO2: 93% (30 Nov 2018 08:27) (91% - 96%)  CAPILLARY BLOOD GLUCOSE      POCT Blood Glucose.: 106 mg/dL (30 Nov 2018 09:04)  POCT Blood Glucose.: 180 mg/dL (29 Nov 2018 18:45)    I&O's Summary    29 Nov 2018 07:01  -  30 Nov 2018 07:00  --------------------------------------------------------  IN: 100 mL / OUT: 725 mL / NET: -625 mL    30 Nov 2018 07:01  -  30 Nov 2018 12:25  --------------------------------------------------------  IN: 300 mL / OUT: 220 mL / NET: 80 mL        PHYSICAL EXAM:  GENERAL: NAD, well-developed  HEAD:  Atraumatic, Normocephalic  EYES: EOMI, PERRLA, conjunctiva and sclera clear  NECK: Supple, No JVD  CHEST/LUNG: Clear to auscultation bilaterally; No wheeze  HEART: S1, S2; No murmurs, rubs, or gallops  ABDOMEN: Soft, mild diffuse ttp, Nondistended; Bowel sounds present. +drain   EXTREMITIES:  2+ Peripheral Pulses, No clubbing, cyanosis, or edema  PSYCH: Normal affect  NEUROLOGY: AAOX3; non-focal  SKIN: No rashes or lesions    Consultant(s) Notes Reviewed:  [x ] YES  [ ] NO  Care Discussed with Consultants/Other Providers [ x] YES  [ ] NO    MEDS:  MEDICATIONS  (STANDING):  cefoTEtan  IVPB 2 Gram(s) IV Intermittent every 12 hours  dextrose 50% Injectable 12.5 Gram(s) IV Push once  dextrose 50% Injectable 25 Gram(s) IV Push once  dextrose 50% Injectable 25 Gram(s) IV Push once  heparin  Injectable 5000 Unit(s) SubCutaneous every 8 hours  insulin lispro (HumaLOG) corrective regimen sliding scale   SubCutaneous three times a day before meals    MEDICATIONS  (PRN):  acetaminophen   Tablet .. 650 milliGRAM(s) Oral every 6 hours PRN Mild Pain (1 - 3)  dextrose 40% Gel 15 Gram(s) Oral once PRN Blood Glucose LESS THAN 70 milliGRAM(s)/deciliter  glucagon  Injectable 1 milliGRAM(s) IntraMuscular once PRN Glucose LESS THAN 70 milligrams/deciliter    ALLERGIES:  No Known Allergies      LABS:                        12.6   11.3  )-----------( 142      ( 30 Nov 2018 09:15 )             37.3     11-30    137  |  101  |  20  ----------------------------<  118<H>  4.0   |  22  |  1.13    Ca    9.1      30 Nov 2018 09:04    TPro  6.6  /  Alb  3.7  /  TBili  0.8  /  DBili  0.2  /  AST  33  /  ALT  35  /  AlkPhos  69  11-30    PT/INR - ( 29 Nov 2018 06:08 )   PT: 11.2 sec;   INR: 0.97 ratio         PTT - ( 29 Nov 2018 06:08 )  PTT:30.9 sec      LIVER FUNCTIONS - ( 30 Nov 2018 09:04 )  Alb: 3.7 g/dL / Pro: 6.6 g/dL / ALK PHOS: 69 U/L / ALT: 35 U/L / AST: 33 U/L / GGT: x             TSH:   A1c:Hemoglobin A1C, Whole Blood: 6.0 % (11-30 @ 10:01)     < from: CT Abdomen and Pelvis w/ IV Cont (11.29.18 @ 08:51) >  IMPRESSION:     Dilated gallbladder containing stones and sludge with adjacent fat   infiltration suspicious for acute cholecystitis.    < end of copied text >

## 2018-11-30 NOTE — PROGRESS NOTE ADULT - ASSESSMENT
89M POD 1 s/p laparoscopic cholecystectomy for acute cholecystitis    - Diet: regular  - Restart home medications  - PT eval  - D/c planning later today or tomorrow with antibiotics 89M POD 1 s/p laparoscopic cholecystectomy for acute cholecystitis    - Diet: low fat  - Restart home medications  - PT eval  - D/c planning tomorrow with antibiotics

## 2018-11-30 NOTE — PHYSICAL THERAPY INITIAL EVALUATION ADULT - GENERAL OBSERVATIONS, REHAB EVAL
Pt received semisupine in bed, [place] speaking with brother translating as needed, following simple commands, willing to participate, +ARLEEN

## 2018-11-30 NOTE — PROGRESS NOTE ADULT - SUBJECTIVE AND OBJECTIVE BOX
Red Team Surgery Progress Note    SUBJECTIVE: Patient seen and examined at the bedside. Feeling well this morning. Tolerating clear liquids without nausea or vomiting. Pain is well controlled. Has passed flatus and a bowel movement. Ambulating well.     VITALS  T(C): 36.6 (11-30-18 @ 08:27), Max: 37.4 (11-29-18 @ 22:20)  HR: 87 (11-30-18 @ 04:46) (77 - 98)  BP: 101/61 (11-30-18 @ 04:46) (101/61 - 165/75)  RR: 18 (11-30-18 @ 08:27) (14 - 19)  SpO2: 93% (11-30-18 @ 08:27) (91% - 96%)  CAPILLARY BLOOD GLUCOSE      POCT Blood Glucose.: 106 mg/dL (30 Nov 2018 09:04)  POCT Blood Glucose.: 180 mg/dL (29 Nov 2018 18:45)    Is/Os    11-29 @ 07:01  -  11-30 @ 07:00  --------------------------------------------------------  IN:    lactated ringers.: 100 mL  Total IN: 100 mL    OUT:    Bulb: 100 mL    Voided: 625 mL  Total OUT: 725 mL    Total NET: -625 mL      11-30 @ 07:01  -  11-30 @ 10:31  --------------------------------------------------------  IN:    Oral Fluid: 300 mL  Total IN: 300 mL    OUT:    Bulb: 20 mL    Voided: 200 mL  Total OUT: 220 mL    Total NET: 80 mL    PHYSICAL EXAM:   General: NAD lying comfortably in bed  Pulm: non labored breathing on 2LNC  CV: RRR  Abdomen: Soft, tender in RUQ, softly distended, no guarding or rebound, elizabeth drain with s/s output, lap dressings are c/d/i    MEDICATIONS (STANDING): cefoTEtan  IVPB 2 Gram(s) IV Intermittent every 12 hours  dextrose 50% Injectable 12.5 Gram(s) IV Push once  dextrose 50% Injectable 25 Gram(s) IV Push once  dextrose 50% Injectable 25 Gram(s) IV Push once  heparin  Injectable 5000 Unit(s) SubCutaneous every 8 hours  insulin lispro (HumaLOG) corrective regimen sliding scale   SubCutaneous three times a day before meals    MEDICATIONS (PRN):acetaminophen   Tablet .. 650 milliGRAM(s) Oral every 6 hours PRN Mild Pain (1 - 3)  dextrose 40% Gel 15 Gram(s) Oral once PRN Blood Glucose LESS THAN 70 milliGRAM(s)/deciliter  glucagon  Injectable 1 milliGRAM(s) IntraMuscular once PRN Glucose LESS THAN 70 milligrams/deciliter    LABS  CBC (11-30 @ 09:15)                              12.6<L>                         11.3<H>  )----------------(  142<L>     --    % Neutrophils, --    % Lymphocytes, ANC: --                                  37.3<L>  CBC (11-29 @ 06:08)                              13.8                           8.8     )----------------(  148<L>     75.8  % Neutrophils, 15.6  % Lymphocytes, ANC: 6.6                                 40.5      BMP (11-30 @ 09:04)             137     |  101     |  20    		Ca++ --      Ca 9.1                ---------------------------------( 118<H>		Mg --                 4.0     |  22      |  1.13  			Ph --      BMP (11-29 @ 06:08)             138     |  103     |  23    		Ca++ --      Ca 9.7                ---------------------------------( 148<H>		Mg --                 3.8     |  19<L>   |  1.09  			Ph --        LFTs (11-30 @ 09:04)      TPro 6.6 / Alb 3.7 / TBili 0.8 / DBili 0.2 / AST 33 / ALT 35 / AlkPhos 69  LFTs (11-29 @ 06:08)      TPro 7.0 / Alb 4.2 / TBili 0.8 / DBili -- / AST 12 / ALT 13 / AlkPhos 88    Coags (11-29 @ 06:08)  aPTT 30.9 / INR 0.97 / PT 11.2        VBG (11-29 @ 06:08)     7.39 / 39 / 34 / 23 / -1.1 / 62<L>%     Lactate: 1.9

## 2018-11-30 NOTE — PHYSICAL THERAPY INITIAL EVALUATION ADULT - PERTINENT HX OF CURRENT PROBLEM, REHAB EVAL
Pt is 89M admitted 11/29/18 PMHx gallstone pancreatitis; p/w 24 hours of abdominal pain, PO intolerance and subsequent ultrasound and CT imaging showing acute cholecystitis w/stones lodged in the neck.

## 2018-11-30 NOTE — PHYSICAL THERAPY INITIAL EVALUATION ADULT - GAIT DEVIATIONS NOTED, PT EVAL
decreased weight-shifting ability/decreased velocity of limb motion/decreased step length/increased time in double stance/decreased stride length/decreased aman

## 2018-12-01 DIAGNOSIS — R09.02 HYPOXEMIA: ICD-10-CM

## 2018-12-01 LAB
ANION GAP SERPL CALC-SCNC: 16 MMOL/L — SIGNIFICANT CHANGE UP (ref 5–17)
BUN SERPL-MCNC: 26 MG/DL — HIGH (ref 7–23)
CALCIUM SERPL-MCNC: 9.5 MG/DL — SIGNIFICANT CHANGE UP (ref 8.4–10.5)
CHLORIDE SERPL-SCNC: 100 MMOL/L — SIGNIFICANT CHANGE UP (ref 96–108)
CO2 SERPL-SCNC: 20 MMOL/L — LOW (ref 22–31)
CREAT SERPL-MCNC: 1.24 MG/DL — SIGNIFICANT CHANGE UP (ref 0.5–1.3)
GLUCOSE BLDC GLUCOMTR-MCNC: 106 MG/DL — HIGH (ref 70–99)
GLUCOSE BLDC GLUCOMTR-MCNC: 153 MG/DL — HIGH (ref 70–99)
GLUCOSE BLDC GLUCOMTR-MCNC: 155 MG/DL — HIGH (ref 70–99)
GLUCOSE BLDC GLUCOMTR-MCNC: 162 MG/DL — HIGH (ref 70–99)
GLUCOSE SERPL-MCNC: 119 MG/DL — HIGH (ref 70–99)
HCT VFR BLD CALC: 36 % — LOW (ref 39–50)
HGB BLD-MCNC: 11.9 G/DL — LOW (ref 13–17)
MAGNESIUM SERPL-MCNC: 2.2 MG/DL — SIGNIFICANT CHANGE UP (ref 1.6–2.6)
MCHC RBC-ENTMCNC: 26.7 PG — LOW (ref 27–34)
MCHC RBC-ENTMCNC: 33.1 GM/DL — SIGNIFICANT CHANGE UP (ref 32–36)
MCV RBC AUTO: 80.7 FL — SIGNIFICANT CHANGE UP (ref 80–100)
PHOSPHATE SERPL-MCNC: 2.6 MG/DL — SIGNIFICANT CHANGE UP (ref 2.5–4.5)
PLATELET # BLD AUTO: 161 K/UL — SIGNIFICANT CHANGE UP (ref 150–400)
POTASSIUM SERPL-MCNC: 4.2 MMOL/L — SIGNIFICANT CHANGE UP (ref 3.5–5.3)
POTASSIUM SERPL-SCNC: 4.2 MMOL/L — SIGNIFICANT CHANGE UP (ref 3.5–5.3)
RBC # BLD: 4.46 M/UL — SIGNIFICANT CHANGE UP (ref 4.2–5.8)
RBC # FLD: 15.2 % — HIGH (ref 10.3–14.5)
SODIUM SERPL-SCNC: 136 MMOL/L — SIGNIFICANT CHANGE UP (ref 135–145)
WBC # BLD: 12.32 K/UL — HIGH (ref 3.8–10.5)
WBC # FLD AUTO: 12.32 K/UL — HIGH (ref 3.8–10.5)

## 2018-12-01 PROCEDURE — 71045 X-RAY EXAM CHEST 1 VIEW: CPT | Mod: 26

## 2018-12-01 RX ORDER — PANTOPRAZOLE SODIUM 20 MG/1
40 TABLET, DELAYED RELEASE ORAL DAILY
Qty: 0 | Refills: 0 | Status: DISCONTINUED | OUTPATIENT
Start: 2018-12-01 | End: 2018-12-06

## 2018-12-01 RX ORDER — LOSARTAN POTASSIUM 100 MG/1
50 TABLET, FILM COATED ORAL DAILY
Qty: 0 | Refills: 0 | Status: DISCONTINUED | OUTPATIENT
Start: 2018-12-01 | End: 2018-12-06

## 2018-12-01 RX ORDER — ACETAMINOPHEN 500 MG
650 TABLET ORAL EVERY 6 HOURS
Qty: 0 | Refills: 0 | Status: DISCONTINUED | OUTPATIENT
Start: 2018-12-01 | End: 2018-12-06

## 2018-12-01 RX ADMIN — Medication 650 MILLIGRAM(S): at 17:54

## 2018-12-01 RX ADMIN — HEPARIN SODIUM 5000 UNIT(S): 5000 INJECTION INTRAVENOUS; SUBCUTANEOUS at 15:15

## 2018-12-01 RX ADMIN — Medication 100 GRAM(S): at 00:57

## 2018-12-01 RX ADMIN — Medication 100 GRAM(S): at 23:52

## 2018-12-01 RX ADMIN — Medication 650 MILLIGRAM(S): at 11:34

## 2018-12-01 RX ADMIN — Medication 62.5 MILLIMOLE(S): at 18:06

## 2018-12-01 RX ADMIN — HEPARIN SODIUM 5000 UNIT(S): 5000 INJECTION INTRAVENOUS; SUBCUTANEOUS at 05:16

## 2018-12-01 RX ADMIN — Medication 1: at 11:33

## 2018-12-01 RX ADMIN — Medication 100 GRAM(S): at 15:15

## 2018-12-01 RX ADMIN — HEPARIN SODIUM 5000 UNIT(S): 5000 INJECTION INTRAVENOUS; SUBCUTANEOUS at 21:00

## 2018-12-01 RX ADMIN — Medication 650 MILLIGRAM(S): at 23:52

## 2018-12-01 NOTE — PROGRESS NOTE ADULT - ATTENDING COMMENTS
pt seen and examined.  agree with note above  tolerating diet  dc planning with [facility]LEEN for rehab on monday  appreciate med/gi/cv f/u

## 2018-12-01 NOTE — PROGRESS NOTE ADULT - ASSESSMENT
89M POD 2 s/p laparoscopic cholecystectomy for acute cholecystitis    - Diet: low fat  - Pain control as needed  - PT eval  - drain teaching  - D/c planning to [facility] with antibiotics

## 2018-12-01 NOTE — PROGRESS NOTE ADULT - SUBJECTIVE AND OBJECTIVE BOX
Red Team Surgery Progress Note    SUBJECTIVE: Patient seen and examined at the bedside. No acute events overnight. Feeling well this morning. Tolerating regular diet without nausea or vomiting. Pain is well controlled. Has passed flatus and a bowel movement. Continues to require supplemental O2. Will need [facility] as per PT eval.     VITALS  T(C): 36.8 (12-01-18 @ 01:13), Max: 36.9 (11-30-18 @ 14:17)  HR: 79 (12-01-18 @ 01:13) (69 - 101)  BP: 135/71 (12-01-18 @ 01:13) (105/62 - 153/78)  RR: 18 (12-01-18 @ 01:13) (18 - 18)  SpO2: 94% (12-01-18 @ 01:13) (91% - 94%)  CAPILLARY BLOOD GLUCOSE      POCT Blood Glucose.: 162 mg/dL (30 Nov 2018 22:08)  POCT Blood Glucose.: 148 mg/dL (30 Nov 2018 18:27)  POCT Blood Glucose.: 128 mg/dL (30 Nov 2018 13:09)  POCT Blood Glucose.: 106 mg/dL (30 Nov 2018 09:04)    Is/Os    11-29 @ 07:01  -  11-30 @ 07:00  --------------------------------------------------------  IN:    lactated ringers.: 100 mL  Total IN: 100 mL    OUT:    Bulb: 100 mL    Voided: 625 mL  Total OUT: 725 mL    Total NET: -625 mL      11-30 @ 07:01  -  12-01 @ 05:40  --------------------------------------------------------  IN:    IV PiggyBack: 50 mL    Oral Fluid: 580 mL  Total IN: 630 mL    OUT:    Bulb: 75 mL    Voided: 980 mL  Total OUT: 1055 mL    Total NET: -425 mL        PHYSICAL EXAM:   General: NAD, Lying in bed comfortably, alert, oriented x3  Pulm: Non-labored breathing on 2L NC  GI/Abd: Soft, mildly tender in RUQ, ND, no rebound/guarding, elizabeth drain with s/s output, lap dressings are c/d/i    MEDICATIONS (STANDING): cefoTEtan  IVPB 2 Gram(s) IV Intermittent every 12 hours  heparin  Injectable 5000 Unit(s) SubCutaneous every 8 hours  insulin lispro (HumaLOG) corrective regimen sliding scale   SubCutaneous three times a day before meals    MEDICATIONS (PRN):acetaminophen   Tablet .. 650 milliGRAM(s) Oral every 6 hours PRN Mild Pain (1 - 3)  glucagon  Injectable 1 milliGRAM(s) IntraMuscular once PRN Glucose LESS THAN 70 milligrams/deciliter    LABS  CBC (11-30 @ 09:15)                              12.6<L>                         11.3<H>  )----------------(  142<L>     --    % Neutrophils, --    % Lymphocytes, ANC: --                                  37.3<L>    BMP (11-30 @ 09:04)             137     |  101     |  20    		Ca++ --      Ca 9.1                ---------------------------------( 118<H>		Mg --                 4.0     |  22      |  1.13  			Ph --        LFTs (11-30 @ 09:04)      TPro 6.6 / Alb 3.7 / TBili 0.8 / DBili 0.2 / AST 33 / ALT 35 / AlkPhos 69

## 2018-12-01 NOTE — PROGRESS NOTE ADULT - SUBJECTIVE AND OBJECTIVE BOX
Subjective: Patient seen and examined. No new events except as noted.   POD #2   abd pain   no cp or sob       REVIEW OF SYSTEMS:    CONSTITUTIONAL: + weakness, fevers or chills  EYES/ENT: No visual changes;  No vertigo or throat pain   NECK: No pain or stiffness  RESPIRATORY: No cough, wheezing, hemoptysis; No shortness of breath  CARDIOVASCULAR: No chest pain or palpitations  GASTROINTESTINAL:+abdominal or epigastric pain. No nausea, vomiting, or hematemesis; No diarrhea or constipation. No melena or hematochezia.  GENITOURINARY: No dysuria, frequency or hematuria  NEUROLOGICAL: No numbness or weakness  SKIN: No itching, burning, rashes, or lesions   All other review of systems is negative unless indicated above.    MEDICATIONS:  MEDICATIONS  (STANDING):  acetaminophen   Tablet .. 650 milliGRAM(s) Oral every 6 hours  cefoTEtan  IVPB 2 Gram(s) IV Intermittent every 12 hours  heparin  Injectable 5000 Unit(s) SubCutaneous every 8 hours  insulin lispro (HumaLOG) corrective regimen sliding scale   SubCutaneous three times a day before meals  losartan 50 milliGRAM(s) Oral daily  pantoprazole    Tablet 40 milliGRAM(s) Oral daily      PHYSICAL EXAM:  T(C): 37.2 (12-01-18 @ 18:01), Max: 37.2 (12-01-18 @ 18:01)  HR: 77 (12-01-18 @ 18:01) (68 - 100)  BP: 139/77 (12-01-18 @ 18:01) (105/62 - 139/77)  RR: 18 (12-01-18 @ 18:01) (18 - 18)  SpO2: 91% (12-01-18 @ 18:01) (91% - 94%)  Wt(kg): --  I&O's Summary    30 Nov 2018 07:01  -  01 Dec 2018 07:00  --------------------------------------------------------  IN: 680 mL / OUT: 1355 mL / NET: -675 mL    01 Dec 2018 07:01  -  01 Dec 2018 19:27  --------------------------------------------------------  IN: 600 mL / OUT: 830 mL / NET: -230 mL          Appearance: NAD  HEENT:   Normal oral mucosa, PERRL, EOMI	  Lymphatic: No lymphadenopathy , no edema  Cardiovascular: Normal S1 S2, No JVD, No murmurs , Peripheral pulses palpable 2+ bilaterally  Respiratory: Lungs clear to auscultation, normal effort 	  Gastrointestinal:  Soft, Mildly tender, + BS	  Skin: No rashes, No ecchymoses, No cyanosis, warm to touch  Musculoskeletal: Normal range of motion, normal strength  Psychiatry:  Mood & affect appropriate  Ext: No edema      LABS:    CARDIAC MARKERS:                                11.9   12.32 )-----------( 161      ( 01 Dec 2018 08:58 )             36.0     12-01    136  |  100  |  26<H>  ----------------------------<  119<H>  4.2   |  20<L>  |  1.24    Ca    9.5      01 Dec 2018 07:24  Phos  2.6     12-01  Mg     2.2     12-01    TPro  6.6  /  Alb  3.7  /  TBili  0.8  /  DBili  0.2  /  AST  33  /  ALT  35  /  AlkPhos  69  11-30    proBNP:   Lipid Profile:   HgA1c:   TSH:             TELEMETRY: 	    ECG:  	  RADIOLOGY:   DIAGNOSTIC TESTING:  [ ] Echocardiogram:  [ ]  Catheterization:  [ ] Stress Test:    OTHER: Subjective: Patient seen and examined. No new events except as noted.   POD #2   abd pain   no cp or sob   has been hypoxic     REVIEW OF SYSTEMS:    CONSTITUTIONAL: + weakness, fevers or chills  EYES/ENT: No visual changes;  No vertigo or throat pain   NECK: No pain or stiffness  RESPIRATORY: No cough, wheezing, hemoptysis; No shortness of breath  CARDIOVASCULAR: No chest pain or palpitations  GASTROINTESTINAL:+abdominal or epigastric pain. No nausea, vomiting, or hematemesis; No diarrhea or constipation. No melena or hematochezia.  GENITOURINARY: No dysuria, frequency or hematuria  NEUROLOGICAL: No numbness or weakness  SKIN: No itching, burning, rashes, or lesions   All other review of systems is negative unless indicated above.    MEDICATIONS:  MEDICATIONS  (STANDING):  acetaminophen   Tablet .. 650 milliGRAM(s) Oral every 6 hours  cefoTEtan  IVPB 2 Gram(s) IV Intermittent every 12 hours  heparin  Injectable 5000 Unit(s) SubCutaneous every 8 hours  insulin lispro (HumaLOG) corrective regimen sliding scale   SubCutaneous three times a day before meals  losartan 50 milliGRAM(s) Oral daily  pantoprazole    Tablet 40 milliGRAM(s) Oral daily      PHYSICAL EXAM:  T(C): 37.2 (12-01-18 @ 18:01), Max: 37.2 (12-01-18 @ 18:01)  HR: 77 (12-01-18 @ 18:01) (68 - 100)  BP: 139/77 (12-01-18 @ 18:01) (105/62 - 139/77)  RR: 18 (12-01-18 @ 18:01) (18 - 18)  SpO2: 91% (12-01-18 @ 18:01) (91% - 94%)  Wt(kg): --  I&O's Summary    30 Nov 2018 07:01  -  01 Dec 2018 07:00  --------------------------------------------------------  IN: 680 mL / OUT: 1355 mL / NET: -675 mL    01 Dec 2018 07:01  -  01 Dec 2018 19:27  --------------------------------------------------------  IN: 600 mL / OUT: 830 mL / NET: -230 mL          Appearance: NAD  HEENT:   Normal oral mucosa, PERRL, EOMI	  Lymphatic: No lymphadenopathy , no edema  Cardiovascular: Normal S1 S2, No JVD, No murmurs , Peripheral pulses palpable 2+ bilaterally  Respiratory: Lungs clear to auscultation, normal effort 	  Gastrointestinal:  Soft, Mildly tender, + BS	  Skin: No rashes, No ecchymoses, No cyanosis, warm to touch  Musculoskeletal: Normal range of motion, normal strength  Psychiatry:  Mood & affect appropriate  Ext: No edema      LABS:    CARDIAC MARKERS:                                11.9   12.32 )-----------( 161      ( 01 Dec 2018 08:58 )             36.0     12-01    136  |  100  |  26<H>  ----------------------------<  119<H>  4.2   |  20<L>  |  1.24    Ca    9.5      01 Dec 2018 07:24  Phos  2.6     12-01  Mg     2.2     12-01    TPro  6.6  /  Alb  3.7  /  TBili  0.8  /  DBili  0.2  /  AST  33  /  ALT  35  /  AlkPhos  69  11-30    proBNP:   Lipid Profile:   HgA1c:   TSH:             TELEMETRY: 	    ECG:  	  RADIOLOGY:   DIAGNOSTIC TESTING:  [ ] Echocardiogram:  [ ]  Catheterization:  [ ] Stress Test:    OTHER:

## 2018-12-01 NOTE — PROGRESS NOTE ADULT - SUBJECTIVE AND OBJECTIVE BOX
CHRISTOPH SCHROEDER  89y Male  MRN:[id number]    Patient is a 89y old  Male who presents with a chief complaint of acute cholecystitis (30 Nov 2018 08:31)    CC: abdominal pain, cholecystitis   HPI: 89M h/o gallstone pancreatitis 2009 who presents with 24 hours of abdominal pain, PO intolerance and subsequent ultrasound and CT imaging showing acute cholecystitis w/stones lodged in the neck. Non-ill appearing, RUQ tenderness on exam. Afebrile, non-leukocytotic. No transaminitis, hyperbilirubinemia or elevated alkaline phosphatase. Last meal was >12 hours prior to admission. H/o gastrectomy for gastric ulcer ~60-70 years prior; well-healed large midline incision.     Patient does not have a cardiologist. Can do two flights of stairs without becoming short of breath. Denies current chest pain or shortness of breath.        Medical and Surgical History  Acute Pancreatitis  Diabetes Mellitus Type II  Age Related Macular Degeneration  Essential (Primary) Hypertension  S/P Gastrectomy  Injury of Superficial Femoral Artery: repaired  S/P Tonsillectomy  S/P Exploratory Laparotomy: reportedly for gastric ulcer, unknown procedure  Essential (Primary) Hypertension (29 Nov 2018 10:28)      Patient seen and evaluated at bedside. No acute events overnight except as noted.    Interval HPI: s/p lap [place], requiring supp o2    PAST MEDICAL & SURGICAL HISTORY:  Acute Pancreatitis  Diabetes Mellitus Type II  Age Related Macular Degeneration  Essential (Primary) Hypertension  S/P Gastrectomy  Injury of Superficial Femoral Artery: repaired  S/P Tonsillectomy  S/P Exploratory Laparotomy: reportedly for gastric ulcer, unknown procedure  Essential (Primary) Hypertension    SOC:  non smoker  no drug or alcohol abuse    fam hx:  non cont    REVIEW OF SYSTEMS:  as per hpi    VITALS:  Vital Signs Last 24 Hrs  T(C): 36.4 (01 Dec 2018 21:36), Max: 37.2 (01 Dec 2018 18:01)  T(F): 97.6 (01 Dec 2018 21:36), Max: 98.9 (01 Dec 2018 18:01)  HR: 71 (01 Dec 2018 21:36) (68 - 100)  BP: 135/57 (01 Dec 2018 21:36) (108/70 - 139/77)  BP(mean): --  RR: 17 (01 Dec 2018 21:36) (17 - 18)  SpO2: 94% (01 Dec 2018 21:36) (91% - 94%)      PHYSICAL EXAM:  GENERAL: NAD, well-developed  HEAD:  Atraumatic, Normocephalic  EYES: EOMI, PERRLA, conjunctiva and sclera clear  NECK: Supple, No JVD  CHEST/LUNG: dec bs b/l  HEART: S1, S2; No murmurs, rubs, or gallops  ABDOMEN: Soft, mild diffuse ttp, Nondistended; Bowel sounds present. +drain   EXTREMITIES:  2+ Peripheral Pulses, No clubbing, cyanosis, or edema  PSYCH: Normal affect  NEUROLOGY: AAOX3; non-focal  SKIN: No rashes or lesions    Consultant(s) Notes Reviewed:  [x ] YES  [ ] NO  Care Discussed with Consultants/Other Providers [ x] YES  [ ] NO    MEDS:  MEDICATIONS  (STANDING):  acetaminophen   Tablet .. 650 milliGRAM(s) Oral every 6 hours  cefoTEtan  IVPB 2 Gram(s) IV Intermittent every 12 hours  heparin  Injectable 5000 Unit(s) SubCutaneous every 8 hours  insulin lispro (HumaLOG) corrective regimen sliding scale   SubCutaneous three times a day before meals  losartan 50 milliGRAM(s) Oral daily  pantoprazole    Tablet 40 milliGRAM(s) Oral daily    MEDICATIONS  (PRN):  glucagon  Injectable 1 milliGRAM(s) IntraMuscular once PRN Glucose LESS THAN 70 milligrams/deciliter    ALLERGIES:  No Known Allergies      LABS:                                    11.9   12.32 )-----------( 161      ( 01 Dec 2018 08:58 )             36.0   12-01    136  |  100  |  26<H>  ----------------------------<  119<H>  4.2   |  20<L>  |  1.24    Ca    9.5      01 Dec 2018 07:24  Phos  2.6     12-01  Mg     2.2     12-01    TPro  6.6  /  Alb  3.7  /  TBili  0.8  /  DBili  0.2  /  AST  33  /  ALT  35  /  AlkPhos  69  11-30       < from: CT Abdomen and Pelvis w/ IV Cont (11.29.18 @ 08:51) >  IMPRESSION:     Dilated gallbladder containing stones and sludge with adjacent fat   infiltration suspicious for acute cholecystitis.    < end of copied text >

## 2018-12-01 NOTE — PROGRESS NOTE ADULT - ASSESSMENT
89 yo male ho dm, htn, a/w acute s[place]ia    pt s/p lap [place]  POD#2  surg followup   pain control  incentive spirometry  ambulation  advance diet as tolerated  iv abx    hypoxia  cxr ordred  f/u findings   supp o2    DM  on insulin  monitor fs    dvt ppx

## 2018-12-01 NOTE — PROGRESS NOTE ADULT - SUBJECTIVE AND OBJECTIVE BOX
INTERVAL HPI/OVERNIGHT EVENTS:  events noted  MEDICATIONS  (STANDING):  acetaminophen   Tablet .. 650 milliGRAM(s) Oral every 6 hours  cefoTEtan  IVPB 2 Gram(s) IV Intermittent every 12 hours  heparin  Injectable 5000 Unit(s) SubCutaneous every 8 hours  insulin lispro (HumaLOG) corrective regimen sliding scale   SubCutaneous three times a day before meals  sodium phosphate IVPB 15 milliMole(s) IV Intermittent once    MEDICATIONS  (PRN):  glucagon  Injectable 1 milliGRAM(s) IntraMuscular once PRN Glucose LESS THAN 70 milligrams/deciliter      Allergies    No Known Allergies    Intolerances        Review of Systems:    General:  No wt loss, fevers, chills, night sweats,fatigue,   Eyes:  Good vision, no reported pain  ENT:  No sore throat, pain, runny nose, dysphagia  CV:  No pain, palpitatioins, hypo/hypertension  Resp:  No dyspnea, cough, tachypnea, wheezing  GI:  No pain, No nausea, No vomiting, No diarrhea, No constipatiion, No weight loss, No fever, No pruritis, No rectal bleeding, No tarry stools, No dysphagia,  :  No pain, bleeding, incontinence, nocturia  Muscle:  No pain, weakness  Neuro:  No weakness, tingling, memory problems  Psych:  No fatigue, insomnia, mood problems, depression  Endocrine:  No polyuria, polydypsia, cold/heat intolerance  Heme:  No petechiae, ecchymosis, easy bruisability  Skin:  No rash, tattoos, scars, edema      Vital Signs Last 24 Hrs  T(C): 36.3 (01 Dec 2018 14:01), Max: 37.1 (01 Dec 2018 05:54)  T(F): 97.3 (01 Dec 2018 14:01), Max: 98.7 (01 Dec 2018 05:54)  HR: 71 (01 Dec 2018 14:01) (68 - 100)  BP: 108/70 (01 Dec 2018 14:01) (105/62 - 135/71)  BP(mean): --  RR: 18 (01 Dec 2018 14:01) (18 - 18)  SpO2: 94% (01 Dec 2018 14:01) (93% - 94%)    PHYSICAL EXAM:    Constitutional: NAD, well-developed  HEENT: EOMI, throat clear  Neck: No LAD, supple  Respiratory: CTA and P  Cardiovascular: S1 and S2, RRR, no M  Gastrointestinal: BS+, soft, NT/ND, neg HSM,  Extremities: No peripheral edema, neg clubing, cyanosis  Vascular: 2+ peripheral pulses  Neurological: A/O x 3, no focal deficits  Psychiatric: Normal mood, normal affect  Skin: No rashes      LABS:                        11.9   12.32 )-----------( 161      ( 01 Dec 2018 08:58 )             36.0     12-01    136  |  100  |  26<H>  ----------------------------<  119<H>  4.2   |  20<L>  |  1.24    Ca    9.5      01 Dec 2018 07:24  Phos  2.6     12-01  Mg     2.2     12-01    TPro  6.6  /  Alb  3.7  /  TBili  0.8  /  DBili  0.2  /  AST  33  /  ALT  35  /  AlkPhos  69  11-30          RADIOLOGY & ADDITIONAL TESTS:

## 2018-12-02 LAB
ALBUMIN SERPL ELPH-MCNC: 3.6 G/DL — SIGNIFICANT CHANGE UP (ref 3.3–5)
ALP SERPL-CCNC: 74 U/L — SIGNIFICANT CHANGE UP (ref 40–120)
ALT FLD-CCNC: 27 U/L — SIGNIFICANT CHANGE UP (ref 10–45)
ANION GAP SERPL CALC-SCNC: 19 MMOL/L — HIGH (ref 5–17)
AST SERPL-CCNC: 18 U/L — SIGNIFICANT CHANGE UP (ref 10–40)
BILIRUB DIRECT SERPL-MCNC: 0.1 MG/DL — SIGNIFICANT CHANGE UP (ref 0–0.2)
BILIRUB INDIRECT FLD-MCNC: 0.4 MG/DL — SIGNIFICANT CHANGE UP (ref 0.2–1)
BILIRUB SERPL-MCNC: 0.5 MG/DL — SIGNIFICANT CHANGE UP (ref 0.2–1.2)
BUN SERPL-MCNC: 30 MG/DL — HIGH (ref 7–23)
CALCIUM SERPL-MCNC: 9.7 MG/DL — SIGNIFICANT CHANGE UP (ref 8.4–10.5)
CHLORIDE SERPL-SCNC: 97 MMOL/L — SIGNIFICANT CHANGE UP (ref 96–108)
CO2 SERPL-SCNC: 20 MMOL/L — LOW (ref 22–31)
CREAT SERPL-MCNC: 1.32 MG/DL — HIGH (ref 0.5–1.3)
GLUCOSE BLDC GLUCOMTR-MCNC: 118 MG/DL — HIGH (ref 70–99)
GLUCOSE BLDC GLUCOMTR-MCNC: 122 MG/DL — HIGH (ref 70–99)
GLUCOSE BLDC GLUCOMTR-MCNC: 152 MG/DL — HIGH (ref 70–99)
GLUCOSE BLDC GLUCOMTR-MCNC: 183 MG/DL — HIGH (ref 70–99)
GLUCOSE SERPL-MCNC: 120 MG/DL — HIGH (ref 70–99)
HCT VFR BLD CALC: 37 % — LOW (ref 39–50)
HGB BLD-MCNC: 12 G/DL — LOW (ref 13–17)
MAGNESIUM SERPL-MCNC: 2.2 MG/DL — SIGNIFICANT CHANGE UP (ref 1.6–2.6)
MCHC RBC-ENTMCNC: 26.8 PG — LOW (ref 27–34)
MCHC RBC-ENTMCNC: 32.4 GM/DL — SIGNIFICANT CHANGE UP (ref 32–36)
MCV RBC AUTO: 82.8 FL — SIGNIFICANT CHANGE UP (ref 80–100)
PHOSPHATE SERPL-MCNC: 3.3 MG/DL — SIGNIFICANT CHANGE UP (ref 2.5–4.5)
PLATELET # BLD AUTO: 190 K/UL — SIGNIFICANT CHANGE UP (ref 150–400)
POTASSIUM SERPL-MCNC: 3.8 MMOL/L — SIGNIFICANT CHANGE UP (ref 3.5–5.3)
POTASSIUM SERPL-SCNC: 3.8 MMOL/L — SIGNIFICANT CHANGE UP (ref 3.5–5.3)
PROT SERPL-MCNC: 7.2 G/DL — SIGNIFICANT CHANGE UP (ref 6–8.3)
RBC # BLD: 4.47 M/UL — SIGNIFICANT CHANGE UP (ref 4.2–5.8)
RBC # FLD: 15.3 % — HIGH (ref 10.3–14.5)
SODIUM SERPL-SCNC: 136 MMOL/L — SIGNIFICANT CHANGE UP (ref 135–145)
WBC # BLD: 11.14 K/UL — HIGH (ref 3.8–10.5)
WBC # FLD AUTO: 11.14 K/UL — HIGH (ref 3.8–10.5)

## 2018-12-02 RX ORDER — POTASSIUM CHLORIDE 20 MEQ
20 PACKET (EA) ORAL ONCE
Qty: 0 | Refills: 0 | Status: COMPLETED | OUTPATIENT
Start: 2018-12-02 | End: 2018-12-02

## 2018-12-02 RX ORDER — ALFUZOSIN HYDROCHLORIDE 10 MG/1
10 TABLET, EXTENDED RELEASE ORAL AT BEDTIME
Qty: 0 | Refills: 0 | Status: DISCONTINUED | OUTPATIENT
Start: 2018-12-02 | End: 2018-12-06

## 2018-12-02 RX ORDER — TAMSULOSIN HYDROCHLORIDE 0.4 MG/1
0.4 CAPSULE ORAL AT BEDTIME
Qty: 0 | Refills: 0 | Status: DISCONTINUED | OUTPATIENT
Start: 2018-12-02 | End: 2018-12-02

## 2018-12-02 RX ORDER — TAMSULOSIN HYDROCHLORIDE 0.4 MG/1
0.4 CAPSULE ORAL ONCE
Qty: 0 | Refills: 0 | Status: COMPLETED | OUTPATIENT
Start: 2018-12-02 | End: 2018-12-02

## 2018-12-02 RX ADMIN — LOSARTAN POTASSIUM 50 MILLIGRAM(S): 100 TABLET, FILM COATED ORAL at 05:39

## 2018-12-02 RX ADMIN — Medication 650 MILLIGRAM(S): at 05:39

## 2018-12-02 RX ADMIN — Medication 1: at 14:43

## 2018-12-02 RX ADMIN — TAMSULOSIN HYDROCHLORIDE 0.4 MILLIGRAM(S): 0.4 CAPSULE ORAL at 10:11

## 2018-12-02 RX ADMIN — Medication 650 MILLIGRAM(S): at 00:14

## 2018-12-02 RX ADMIN — HEPARIN SODIUM 5000 UNIT(S): 5000 INJECTION INTRAVENOUS; SUBCUTANEOUS at 23:00

## 2018-12-02 RX ADMIN — ALFUZOSIN HYDROCHLORIDE 10 MILLIGRAM(S): 10 TABLET, EXTENDED RELEASE ORAL at 23:00

## 2018-12-02 RX ADMIN — Medication 100 GRAM(S): at 14:17

## 2018-12-02 RX ADMIN — PANTOPRAZOLE SODIUM 40 MILLIGRAM(S): 20 TABLET, DELAYED RELEASE ORAL at 13:29

## 2018-12-02 RX ADMIN — Medication 650 MILLIGRAM(S): at 23:05

## 2018-12-02 RX ADMIN — Medication 1: at 18:36

## 2018-12-02 RX ADMIN — Medication 650 MILLIGRAM(S): at 23:35

## 2018-12-02 RX ADMIN — Medication 650 MILLIGRAM(S): at 06:55

## 2018-12-02 RX ADMIN — Medication 650 MILLIGRAM(S): at 18:39

## 2018-12-02 RX ADMIN — Medication 20 MILLIEQUIVALENT(S): at 14:24

## 2018-12-02 RX ADMIN — HEPARIN SODIUM 5000 UNIT(S): 5000 INJECTION INTRAVENOUS; SUBCUTANEOUS at 05:39

## 2018-12-02 RX ADMIN — HEPARIN SODIUM 5000 UNIT(S): 5000 INJECTION INTRAVENOUS; SUBCUTANEOUS at 14:25

## 2018-12-02 RX ADMIN — Medication 650 MILLIGRAM(S): at 13:29

## 2018-12-02 NOTE — PROGRESS NOTE ADULT - SUBJECTIVE AND OBJECTIVE BOX
Subjective: Patient seen and examined. No new events except as noted.   resting in bed   No cp or sob     REVIEW OF SYSTEMS:    CONSTITUTIONAL: + weakness, fevers or chills  EYES/ENT: No visual changes;  No vertigo or throat pain   NECK: No pain or stiffness  RESPIRATORY: No cough, wheezing, hemoptysis; No shortness of breath  CARDIOVASCULAR: No chest pain or palpitations  GASTROINTESTINAL: No abdominal or epigastric pain. No nausea, vomiting, or hematemesis; No diarrhea or constipation. No melena or hematochezia.  GENITOURINARY: No dysuria, frequency or hematuria  NEUROLOGICAL: No numbness or weakness  SKIN: No itching, burning, rashes, or lesions   All other review of systems is negative unless indicated above.    MEDICATIONS:  MEDICATIONS  (STANDING):  acetaminophen   Tablet .. 650 milliGRAM(s) Oral every 6 hours  alfuzosin 10 milliGRAM(s) Oral at bedtime  cefoTEtan  IVPB 2 Gram(s) IV Intermittent every 12 hours  heparin  Injectable 5000 Unit(s) SubCutaneous every 8 hours  insulin lispro (HumaLOG) corrective regimen sliding scale   SubCutaneous three times a day before meals  losartan 50 milliGRAM(s) Oral daily  pantoprazole    Tablet 40 milliGRAM(s) Oral daily      PHYSICAL EXAM:  T(C): 36.7 (12-02-18 @ 10:00), Max: 37.2 (12-01-18 @ 18:01)  HR: 88 (12-02-18 @ 10:00) (71 - 88)  BP: 119/78 (12-02-18 @ 10:00) (108/70 - 159/83)  RR: 18 (12-02-18 @ 10:00) (17 - 18)  SpO2: 95% (12-02-18 @ 10:00) (91% - 95%)  Wt(kg): --  I&O's Summary    01 Dec 2018 07:01  -  02 Dec 2018 07:00  --------------------------------------------------------  IN: 770 mL / OUT: 1430 mL / NET: -660 mL    02 Dec 2018 07:01  -  02 Dec 2018 11:26  --------------------------------------------------------  IN: 240 mL / OUT: 1210 mL / NET: -970 mL          Appearance: NAD	  HEENT:   Dry oral mucosa, PERRL, EOMI	  Lymphatic: No lymphadenopathy , no edema  Cardiovascular: Normal S1 S2, No JVD, No murmurs , Peripheral pulses palpable 2+ bilaterally  Respiratory: Lungs clear to auscultation, normal effort 	  Gastrointestinal:  Soft, Non-tender, + BS	  Skin: No rashes, No ecchymoses, No cyanosis, warm to touch  Musculoskeletal: Normal range of motion, normal strength  Psychiatry:  Mood & affect appropriate  Ext: No edema      LABS:    CARDIAC MARKERS:                                12.0   11.14 )-----------( 190      ( 02 Dec 2018 09:21 )             37.0     12-02    136  |  97  |  30<H>  ----------------------------<  120<H>  3.8   |  20<L>  |  1.32<H>    Ca    9.7      02 Dec 2018 07:27  Phos  3.3     12-02  Mg     2.2     12-02    TPro  7.2  /  Alb  3.6  /  TBili  0.5  /  DBili  0.1  /  AST  18  /  ALT  27  /  AlkPhos  74  12-02    proBNP:   Lipid Profile:   HgA1c:   TSH:             TELEMETRY: 	    ECG:  	  RADIOLOGY:   DIAGNOSTIC TESTING:  [ ] Echocardiogram:  [ ]  Catheterization:  [ ] Stress Test:    OTHER:

## 2018-12-02 NOTE — PROGRESS NOTE ADULT - SUBJECTIVE AND OBJECTIVE BOX
Red Team Surgery Progress Note    SUBJECTIVE: Patient seen and examined at the bedside. No acute events overnight. Feeling well this morning. Tolerating regular diet without nausea or vomiting. Pain is well controlled. Restarted home losartan and protonix last. Has passed flatus and bowel movements. Ambulating well.     VITALS  T(C): 37.1 (12-02-18 @ 06:12), Max: 37.2 (12-01-18 @ 18:01)  HR: 78 (12-02-18 @ 06:12) (68 - 78)  BP: 159/83 (12-02-18 @ 06:12) (108/70 - 159/83)  RR: 18 (12-02-18 @ 06:12) (17 - 18)  SpO2: 93% (12-02-18 @ 06:12) (91% - 95%)  CAPILLARY BLOOD GLUCOSE      POCT Blood Glucose.: 162 mg/dL (01 Dec 2018 21:46)  POCT Blood Glucose.: 106 mg/dL (01 Dec 2018 17:50)  POCT Blood Glucose.: 153 mg/dL (01 Dec 2018 14:56)  POCT Blood Glucose.: 155 mg/dL (01 Dec 2018 11:25)    Is/Os    11-30 @ 07:01  -  12-01 @ 07:00  --------------------------------------------------------  IN:    IV PiggyBack: 100 mL    Oral Fluid: 580 mL  Total IN: 680 mL    OUT:    Bulb: 75 mL    Voided: 1280 mL  Total OUT: 1355 mL    Total NET: -675 mL      12-01 @ 07:01  -  12-02 @ 06:49  --------------------------------------------------------  IN:    IV PiggyBack: 350 mL    Oral Fluid: 420 mL  Total IN: 770 mL    OUT:    Bulb: 80 mL    Voided: 1350 mL  Total OUT: 1430 mL    Total NET: -660 mL    PHYSICAL EXAM:   General: NAD, Lying in bed comfortably, alert, oriented x3  Pulm: Non-labored breathing on 2L NC  GI/Abd: Soft, mildly tender in RUQ, ND, no rebound/guarding, elizabeth drain with s/s output, lap dressings are c/d/i    MEDICATIONS (STANDING): acetaminophen   Tablet .. 650 milliGRAM(s) Oral every 6 hours  cefoTEtan  IVPB 2 Gram(s) IV Intermittent every 12 hours  heparin  Injectable 5000 Unit(s) SubCutaneous every 8 hours  insulin lispro (HumaLOG) corrective regimen sliding scale   SubCutaneous three times a day before meals  losartan 50 milliGRAM(s) Oral daily  pantoprazole    Tablet 40 milliGRAM(s) Oral daily    MEDICATIONS (PRN):glucagon  Injectable 1 milliGRAM(s) IntraMuscular once PRN Glucose LESS THAN 70 milligrams/deciliter    LABS  CBC (12-01 @ 08:58)                              11.9<L>                         12.32<H>  )----------------(  161        --    % Neutrophils, --    % Lymphocytes, ANC: --                                  36.0<L>    BMP (12-01 @ 07:24)             136     |  100     |  26<H> 		Ca++ --      Ca 9.5                ---------------------------------( 119<H>		Mg 2.2                4.2     |  20<L>   |  1.24  			Ph 2.6 Red Team Surgery Progress Note    SUBJECTIVE: Patient seen and examined at the bedside. No acute events overnight. Complaining of suprapubic pain. Bladder scan done at the bedside showed ~800 cc of urine in the bladder. Marinelli catheter placed with 1100 drained. Tolerating regular diet without nausea or vomiting. Restarted home losartan and protonix last. Has passed flatus and bowel movements. Ambulating well.     VITALS  T(C): 37.1 (12-02-18 @ 06:12), Max: 37.2 (12-01-18 @ 18:01)  HR: 78 (12-02-18 @ 06:12) (68 - 78)  BP: 159/83 (12-02-18 @ 06:12) (108/70 - 159/83)  RR: 18 (12-02-18 @ 06:12) (17 - 18)  SpO2: 93% (12-02-18 @ 06:12) (91% - 95%)  CAPILLARY BLOOD GLUCOSE      POCT Blood Glucose.: 162 mg/dL (01 Dec 2018 21:46)  POCT Blood Glucose.: 106 mg/dL (01 Dec 2018 17:50)  POCT Blood Glucose.: 153 mg/dL (01 Dec 2018 14:56)  POCT Blood Glucose.: 155 mg/dL (01 Dec 2018 11:25)    Is/Os    11-30 @ 07:01 - 12-01 @ 07:00  --------------------------------------------------------  IN:    IV PiggyBack: 100 mL    Oral Fluid: 580 mL  Total IN: 680 mL    OUT:    Bulb: 75 mL    Voided: 1280 mL  Total OUT: 1355 mL    Total NET: -675 mL      12-01 @ 07:01 - 12-02 @ 06:49  --------------------------------------------------------  IN:    IV PiggyBack: 350 mL    Oral Fluid: 420 mL  Total IN: 770 mL    OUT:    Bulb: 80 mL    Voided: 1350 mL  Total OUT: 1430 mL    Total NET: -660 mL    PHYSICAL EXAM:   General: NAD, Lying in bed comfortably, alert, oriented x3  Pulm: Non-labored breathing on 2L NC  GI/Abd: Soft, mild suprapubic tenderness and distension, no rebound/guarding, elizabeth drain with s/s output, clots in tubing difficult to strip, suggesting that it is clogged, lap dressings are c/d/i, some bruising surrounding the infra-umbilical incision    MEDICATIONS (STANDING): acetaminophen   Tablet .. 650 milliGRAM(s) Oral every 6 hours  cefoTEtan  IVPB 2 Gram(s) IV Intermittent every 12 hours  heparin  Injectable 5000 Unit(s) SubCutaneous every 8 hours  insulin lispro (HumaLOG) corrective regimen sliding scale   SubCutaneous three times a day before meals  losartan 50 milliGRAM(s) Oral daily  pantoprazole    Tablet 40 milliGRAM(s) Oral daily    MEDICATIONS (PRN):glucagon  Injectable 1 milliGRAM(s) IntraMuscular once PRN Glucose LESS THAN 70 milligrams/deciliter    LABS  CBC (12-01 @ 08:58)                              11.9<L>                         12.32<H>  )----------------(  161        --    % Neutrophils, --    % Lymphocytes, ANC: --                                  36.0<L>    BMP (12-01 @ 07:24)             136     |  100     |  26<H> 		Ca++ --      Ca 9.5                ---------------------------------( 119<H>		Mg 2.2                4.2     |  20<L>   |  1.24  			Ph 2.6

## 2018-12-02 NOTE — PROGRESS NOTE ADULT - ASSESSMENT
89 yo male ho dm, htn, a/w acute [place]    pt s/p lap [place]  POD#3  surg followup   pain control  incentive spirometry  ambulation  advance diet as tolerated  iv abx    hypoxia  cxr noted  no acute findings  incentive spirometry  supp o2    DM  on insulin  monitor fs    dvt ppx

## 2018-12-02 NOTE — PROGRESS NOTE ADULT - ASSESSMENT
89M POD 3 s/p laparoscopic cholecystectomy for acute cholecystitis    - Diet: low fat  - Pain control as needed  - drain teaching  - D/c planning to [facility] with antibiotics 89M POD 3 s/p laparoscopic cholecystectomy for acute cholecystitis    - Diet: low fat  - Pain control as needed  - drain teaching  - cont chavez  - restart BPH medication - alfuzosin 10mg   - D/c planning to [facility] with antibiotics

## 2018-12-02 NOTE — PROGRESS NOTE ADULT - SUBJECTIVE AND OBJECTIVE BOX
CHRISTOPH SCHROEDER  89y Male  MRN:[id number]    Patient is a 89y old  Male who presents with a chief complaint of acute cholecystitis (30 Nov 2018 08:31)    CC: abdominal pain, cholecystitis   HPI: 89M h/o gallstone pancreatitis 2009 who presents with 24 hours of abdominal pain, PO intolerance and subsequent ultrasound and CT imaging showing acute cholecystitis w/stones lodged in the neck. Non-ill appearing, RUQ tenderness on exam. Afebrile, non-leukocytotic. No transaminitis, hyperbilirubinemia or elevated alkaline phosphatase. Last meal was >12 hours prior to admission. H/o gastrectomy for gastric ulcer ~60-70 years prior; well-healed large midline incision.     Patient does not have a cardiologist. Can do two flights of stairs without becoming short of breath. Denies current chest pain or shortness of breath.        Medical and Surgical History  Acute Pancreatitis  Diabetes Mellitus Type II  Age Related Macular Degeneration  Essential (Primary) Hypertension  S/P Gastrectomy  Injury of Superficial Femoral Artery: repaired  S/P Tonsillectomy  S/P Exploratory Laparotomy: reportedly for gastric ulcer, unknown procedure  Essential (Primary) Hypertension (29 Nov 2018 10:28)      Patient seen and evaluated at bedside. No acute events overnight except as noted.    Interval HPI: no acute events o/n     PAST MEDICAL & SURGICAL HISTORY:  Acute Pancreatitis  Diabetes Mellitus Type II  Age Related Macular Degeneration  Essential (Primary) Hypertension  S/P Gastrectomy  Injury of Superficial Femoral Artery: repaired  S/P Tonsillectomy  S/P Exploratory Laparotomy: reportedly for gastric ulcer, unknown procedure  Essential (Primary) Hypertension    SOC:  non smoker  no drug or alcohol abuse    fam hx:  non cont    REVIEW OF SYSTEMS:  as per hpi    VITALS:  Vital Signs Last 24 Hrs  T(C): 36.7 (02 Dec 2018 10:00), Max: 37.2 (01 Dec 2018 18:01)  T(F): 98 (02 Dec 2018 10:00), Max: 98.9 (01 Dec 2018 18:01)  HR: 88 (02 Dec 2018 10:00) (71 - 88)  BP: 119/78 (02 Dec 2018 10:00) (111/71 - 159/83)  BP(mean): --  RR: 18 (02 Dec 2018 10:00) (17 - 18)  SpO2: 95% (02 Dec 2018 10:00) (91% - 95%)      PHYSICAL EXAM:  GENERAL: NAD, well-developed  HEAD:  Atraumatic, Normocephalic  EYES: EOMI, PERRLA, conjunctiva and sclera clear  NECK: Supple, No JVD  CHEST/LUNG: dec bs b/l  HEART: S1, S2; No murmurs, rubs, or gallops  ABDOMEN: Soft, mild diffuse ttp, Nondistended; Bowel sounds present. +drain   EXTREMITIES:  2+ Peripheral Pulses, No clubbing, cyanosis, or edema  PSYCH: Normal affect  NEUROLOGY: AAOX3; non-focal  SKIN: No rashes or lesions    Consultant(s) Notes Reviewed:  [x ] YES  [ ] NO  Care Discussed with Consultants/Other Providers [ x] YES  [ ] NO    MEDS:  MEDICATIONS  (STANDING):  acetaminophen   Tablet .. 650 milliGRAM(s) Oral every 6 hours  alfuzosin 10 milliGRAM(s) Oral at bedtime  cefoTEtan  IVPB 2 Gram(s) IV Intermittent every 12 hours  heparin  Injectable 5000 Unit(s) SubCutaneous every 8 hours  insulin lispro (HumaLOG) corrective regimen sliding scale   SubCutaneous three times a day before meals  losartan 50 milliGRAM(s) Oral daily  pantoprazole    Tablet 40 milliGRAM(s) Oral daily  potassium chloride    Tablet ER 20 milliEquivalent(s) Oral once    MEDICATIONS  (PRN):  glucagon  Injectable 1 milliGRAM(s) IntraMuscular once PRN Glucose LESS THAN 70 milligrams/deciliter      ALLERGIES:  No Known Allergies      LABS:                                          12.0   11.14 )-----------( 190      ( 02 Dec 2018 09:21 )             37.0   12-02    136  |  97  |  30<H>  ----------------------------<  120<H>  3.8   |  20<L>  |  1.32<H>    Ca    9.7      02 Dec 2018 07:27  Phos  3.3     12-02  Mg     2.2     12-02    TPro  7.2  /  Alb  3.6  /  TBili  0.5  /  DBili  0.1  /  AST  18  /  ALT  27  /  AlkPhos  74  12-02      < from: Xray Chest 1 View- PORTABLE-Urgent (12.01.18 @ 19:11) >  IMPRESSION: Low lung volumes. Cardiomegaly. Right basilar atelectasis.    < end of copied text >       < from: CT Abdomen and Pelvis w/ IV Cont (11.29.18 @ 08:51) >  IMPRESSION:     Dilated gallbladder containing stones and sludge with adjacent fat   infiltration suspicious for acute cholecystitis.    < end of copied text >

## 2018-12-03 ENCOUNTER — TRANSCRIPTION ENCOUNTER (OUTPATIENT)
Age: 83
End: 2018-12-03

## 2018-12-03 LAB
ANION GAP SERPL CALC-SCNC: 14 MMOL/L — SIGNIFICANT CHANGE UP (ref 5–17)
BUN SERPL-MCNC: 20 MG/DL — SIGNIFICANT CHANGE UP (ref 7–23)
CALCIUM SERPL-MCNC: 9.3 MG/DL — SIGNIFICANT CHANGE UP (ref 8.4–10.5)
CHLORIDE SERPL-SCNC: 102 MMOL/L — SIGNIFICANT CHANGE UP (ref 96–108)
CO2 SERPL-SCNC: 22 MMOL/L — SIGNIFICANT CHANGE UP (ref 22–31)
CREAT SERPL-MCNC: 1.06 MG/DL — SIGNIFICANT CHANGE UP (ref 0.5–1.3)
GLUCOSE BLDC GLUCOMTR-MCNC: 120 MG/DL — HIGH (ref 70–99)
GLUCOSE BLDC GLUCOMTR-MCNC: 166 MG/DL — HIGH (ref 70–99)
GLUCOSE SERPL-MCNC: 113 MG/DL — HIGH (ref 70–99)
HCT VFR BLD CALC: 33.1 % — LOW (ref 39–50)
HCT VFR BLD CALC: 33.3 % — LOW (ref 39–50)
HGB BLD-MCNC: 10.9 G/DL — LOW (ref 13–17)
HGB BLD-MCNC: 11.1 G/DL — LOW (ref 13–17)
MAGNESIUM SERPL-MCNC: 2.2 MG/DL — SIGNIFICANT CHANGE UP (ref 1.6–2.6)
MCHC RBC-ENTMCNC: 27 PG — SIGNIFICANT CHANGE UP (ref 27–34)
MCHC RBC-ENTMCNC: 27.5 PG — SIGNIFICANT CHANGE UP (ref 27–34)
MCHC RBC-ENTMCNC: 32.9 GM/DL — SIGNIFICANT CHANGE UP (ref 32–36)
MCHC RBC-ENTMCNC: 33.2 GM/DL — SIGNIFICANT CHANGE UP (ref 32–36)
MCV RBC AUTO: 82.1 FL — SIGNIFICANT CHANGE UP (ref 80–100)
MCV RBC AUTO: 82.6 FL — SIGNIFICANT CHANGE UP (ref 80–100)
PHOSPHATE SERPL-MCNC: 2.4 MG/DL — LOW (ref 2.5–4.5)
PLATELET # BLD AUTO: 167 K/UL — SIGNIFICANT CHANGE UP (ref 150–400)
PLATELET # BLD AUTO: 183 K/UL — SIGNIFICANT CHANGE UP (ref 150–400)
POTASSIUM SERPL-MCNC: 4.1 MMOL/L — SIGNIFICANT CHANGE UP (ref 3.5–5.3)
POTASSIUM SERPL-SCNC: 4.1 MMOL/L — SIGNIFICANT CHANGE UP (ref 3.5–5.3)
RBC # BLD: 4.03 M/UL — LOW (ref 4.2–5.8)
RBC # BLD: 4.03 M/UL — LOW (ref 4.2–5.8)
RBC # FLD: 13.8 % — SIGNIFICANT CHANGE UP (ref 10.3–14.5)
RBC # FLD: 15 % — HIGH (ref 10.3–14.5)
SODIUM SERPL-SCNC: 138 MMOL/L — SIGNIFICANT CHANGE UP (ref 135–145)
WBC # BLD: 7 K/UL — SIGNIFICANT CHANGE UP (ref 3.8–10.5)
WBC # BLD: 7.58 K/UL — SIGNIFICANT CHANGE UP (ref 3.8–10.5)
WBC # FLD AUTO: 7 K/UL — SIGNIFICANT CHANGE UP (ref 3.8–10.5)
WBC # FLD AUTO: 7.58 K/UL — SIGNIFICANT CHANGE UP (ref 3.8–10.5)

## 2018-12-03 RX ORDER — ACETAMINOPHEN 500 MG
2 TABLET ORAL
Qty: 0 | Refills: 0 | DISCHARGE
Start: 2018-12-03

## 2018-12-03 RX ADMIN — HEPARIN SODIUM 5000 UNIT(S): 5000 INJECTION INTRAVENOUS; SUBCUTANEOUS at 05:32

## 2018-12-03 RX ADMIN — Medication 650 MILLIGRAM(S): at 13:22

## 2018-12-03 RX ADMIN — Medication 650 MILLIGRAM(S): at 18:33

## 2018-12-03 RX ADMIN — HEPARIN SODIUM 5000 UNIT(S): 5000 INJECTION INTRAVENOUS; SUBCUTANEOUS at 13:21

## 2018-12-03 RX ADMIN — LOSARTAN POTASSIUM 50 MILLIGRAM(S): 100 TABLET, FILM COATED ORAL at 05:33

## 2018-12-03 RX ADMIN — PANTOPRAZOLE SODIUM 40 MILLIGRAM(S): 20 TABLET, DELAYED RELEASE ORAL at 13:21

## 2018-12-03 RX ADMIN — Medication 1: at 13:19

## 2018-12-03 RX ADMIN — Medication 62.5 MILLIMOLE(S): at 15:22

## 2018-12-03 RX ADMIN — Medication 100 GRAM(S): at 13:20

## 2018-12-03 RX ADMIN — Medication 650 MILLIGRAM(S): at 13:52

## 2018-12-03 RX ADMIN — HEPARIN SODIUM 5000 UNIT(S): 5000 INJECTION INTRAVENOUS; SUBCUTANEOUS at 21:48

## 2018-12-03 RX ADMIN — Medication 100 GRAM(S): at 00:19

## 2018-12-03 RX ADMIN — ALFUZOSIN HYDROCHLORIDE 10 MILLIGRAM(S): 10 TABLET, EXTENDED RELEASE ORAL at 21:48

## 2018-12-03 RX ADMIN — Medication 650 MILLIGRAM(S): at 18:03

## 2018-12-03 RX ADMIN — Medication 650 MILLIGRAM(S): at 06:02

## 2018-12-03 RX ADMIN — Medication 650 MILLIGRAM(S): at 05:32

## 2018-12-03 NOTE — DISCHARGE NOTE ADULT - HOSPITAL COURSE
89M h/o gallstone pancreatitis 2009 who presented with 24 hours of abdominal pain, PO intolerance and subsequent ultrasound and CT imaging showing acute cholecystitis w/stones lodged in the neck. He was non-ill appearing, RUQ tenderness on exam. Afebrile, non-leukocytotic. No transaminitis, hyperbilirubinemia or elevated alkaline phosphatase. He was admitted to the surgical service NPO on IVF and given cefotetan. Cardiology clearance was obtained prior to the OR.  Patient underwent a laparoscopic cholecystectomy on 11/29. The patient tolerated the procedure well (see operative report for full details). Postoperatively, diet was advanced as tolerated.  Pain was controlled with an oral regimen.  Marinelli removed and patient began voiding on his own.  Physical therapy evaluated the patient and recommended ROSE.  On day of discharge, the patient was tolerating diet, ambulating well and pain controlled. He will follow up with Dr. Mendez in 1 week. 89M h/o gallstone pancreatitis 2009 who presented with 24 hours of abdominal pain, PO intolerance and subsequent ultrasound and CT imaging showing acute cholecystitis w/stones lodged in the neck. He was non-ill appearing, RUQ tenderness on exam. Afebrile, non-leukocytotic. No transaminitis, hyperbilirubinemia or elevated alkaline phosphatase. He was admitted to the surgical service NPO on IVF and given cefotetan. Cardiology clearance was obtained prior to the OR.  Patient underwent a laparoscopic cholecystectomy on 11/29. The patient tolerated the procedure well (see operative report for full details). Postoperatively, diet was advanced as tolerated.  Pain was controlled with an oral regimen.  Chavez removed and patient began voiding on his own.  Physical therapy evaluated the patient and recommended RO[facility].  Patient had urinary retention and chavez placed. On day of discharge, the patient was tolerating diet, ambulating well and pain controlled. He will follow up with Dr. Mendez in 1 week. 89M h/o gallstone pancreatitis 2009 who presented with 24 hours of abdominal pain, PO intolerance and subsequent ultrasound and CT imaging showing acute cholecystitis w/stones lodged in the neck. He was non-ill appearing, RUQ tenderness on exam. Afebrile, non-leukocytotic. No transaminitis, hyperbilirubinemia or elevated alkaline phosphatase. He was admitted to the surgical service NPO on IVF and given cefotetan. Cardiology clearance was obtained prior to the OR.  Patient underwent a laparoscopic cholecystectomy on 11/29. The patient tolerated the procedure well (see operative report for full details). Postoperatively, diet was advanced as tolerated.  Pain was controlled with an oral regimen.  Chavez removed and patient began voiding on his own.  Physical therapy evaluated the patient and recommended RO[facility].  Patient had urinary retention and chavez placed. On day of discharge, the patient was tolerating diet, ambulating well and pain controlled. He will follow up with Dr. Mendez in 2 weeks.

## 2018-12-03 NOTE — PROGRESS NOTE ADULT - SUBJECTIVE AND OBJECTIVE BOX
Subjective: Patient seen and examined. No new events except as noted.   resting comfortably in bed   no cp or sob     REVIEW OF SYSTEMS:    CONSTITUTIONAL: + weakness, fevers or chills  EYES/ENT: No visual changes;  No vertigo or throat pain   NECK: No pain or stiffness  RESPIRATORY: No cough, wheezing, hemoptysis; No shortness of breath  CARDIOVASCULAR: No chest pain or palpitations  GASTROINTESTINAL: No abdominal or epigastric pain. No nausea, vomiting, or hematemesis; No diarrhea or constipation. No melena or hematochezia.  GENITOURINARY: No dysuria, frequency or hematuria  NEUROLOGICAL: No numbness or weakness  SKIN: No itching, burning, rashes, or lesions   All other review of systems is negative unless indicated above.    MEDICATIONS:  MEDICATIONS  (STANDING):  acetaminophen   Tablet .. 650 milliGRAM(s) Oral every 6 hours  alfuzosin 10 milliGRAM(s) Oral at bedtime  cefoTEtan  IVPB 2 Gram(s) IV Intermittent every 12 hours  heparin  Injectable 5000 Unit(s) SubCutaneous every 8 hours  insulin lispro (HumaLOG) corrective regimen sliding scale   SubCutaneous three times a day before meals  losartan 50 milliGRAM(s) Oral daily  pantoprazole    Tablet 40 milliGRAM(s) Oral daily  sodium phosphate IVPB 15 milliMole(s) IV Intermittent once      PHYSICAL EXAM:  T(C): 37.1 (12-03-18 @ 09:38), Max: 37.2 (12-02-18 @ 17:06)  HR: 65 (12-03-18 @ 09:38) (65 - 86)  BP: 135/74 (12-03-18 @ 09:38) (108/60 - 135/74)  RR: 18 (12-03-18 @ 09:38) (18 - 18)  SpO2: 95% (12-03-18 @ 09:38) (92% - 95%)  Wt(kg): --  I&O's Summary    02 Dec 2018 07:01  -  03 Dec 2018 07:00  --------------------------------------------------------  IN: 690 mL / OUT: 2955 mL / NET: -2265 mL          Appearance: NAD	  HEENT:   Normal oral mucosa, PERRL, EOMI	  Lymphatic: No lymphadenopathy , no edema  Cardiovascular: Normal S1 S2, No JVD, No murmurs , Peripheral pulses palpable 2+ bilaterally  Respiratory: Lungs clear to auscultation, normal effort 	  Gastrointestinal:  Soft, Non-tender, + BS	  Skin: No rashes, No ecchymoses, No cyanosis, warm to touch  Musculoskeletal: Normal range of motion, normal strength  Psychiatry:  Mood & affect appropriate  Ext: No edema      LABS:    CARDIAC MARKERS:                                11.1   7.0   )-----------( 167      ( 03 Dec 2018 09:09 )             33.3     12-03    138  |  102  |  20  ----------------------------<  113<H>  4.1   |  22  |  1.06    Ca    9.3      03 Dec 2018 06:50  Phos  2.4     12-03  Mg     2.2     12-03    TPro  7.2  /  Alb  3.6  /  TBili  0.5  /  DBili  0.1  /  AST  18  /  ALT  27  /  AlkPhos  74  12-02    proBNP:   Lipid Profile:   HgA1c:   TSH:             TELEMETRY: 	    ECG:  	  RADIOLOGY:  < from: Xray Chest 1 View- PORTABLE-Urgent (12.01.18 @ 19:11) >    EXAM:  XR CHEST PORTABLE URGENT 1V                            PROCEDURE DATE:  12/01/2018            INTERPRETATION:  HISTORY: Shortness of breath    TECHNIQUE: A single AP view of the chest was obtained.    COMPARISON: 12/20/2009    FINDINGS:  Thecardiac silhouette is enlarged. There are no focal   consolidations or pleural effusions. There is right basilar atelectasis.   The lung volumes are low. The hilar and mediastinal structures appear   unremarkable. The osseous structures are intact.    IMPRESSION: Low lung volumes. Cardiomegaly. Right basilar atelectasis.                    DARIEN KEVIN M.D., ATTENDING RADIOLOGIST  This document has been electronically signed. Dec  2 2018  8:53AM                < end of copied text >    DIAGNOSTIC TESTING:  [ ] Echocardiogram:  [ ]  Catheterization:  [ ] Stress Test:    OTHER:

## 2018-12-03 NOTE — PROGRESS NOTE ADULT - ASSESSMENT
89M POD 4 s/p laparoscopic cholecystectomy for acute cholecystitis    - Diet: low fat  - Pain control as needed  - drain teaching  - d/c chavez with TOV  - restart BPH medication - alfuzosin 10mg   - D/c planning to [facility] with antibiotics

## 2018-12-03 NOTE — PROGRESS NOTE ADULT - SUBJECTIVE AND OBJECTIVE BOX
Interval Events: Pt seen and examined  mild abdominal tenderness  pt is tolerating PO well, no n/v  + passing flatus     MEDICATIONS  (STANDING):  acetaminophen   Tablet .. 650 milliGRAM(s) Oral every 6 hours  alfuzosin 10 milliGRAM(s) Oral at bedtime  heparin  Injectable 5000 Unit(s) SubCutaneous every 8 hours  insulin lispro (HumaLOG) corrective regimen sliding scale   SubCutaneous three times a day before meals  losartan 50 milliGRAM(s) Oral daily  pantoprazole    Tablet 40 milliGRAM(s) Oral daily  sodium phosphate IVPB 15 milliMole(s) IV Intermittent once    MEDICATIONS  (PRN):  glucagon  Injectable 1 milliGRAM(s) IntraMuscular once PRN Glucose LESS THAN 70 milligrams/deciliter      Allergies    No Known Allergies    Intolerances        Review of Systems:    General:  No wt loss, fevers, chills, night sweats,fatigue,   Eyes:  Good vision, no reported pain  ENT:  No sore throat, pain, runny nose, dysphagia  CV:  No pain, palpitations, hypo/hypertension  Resp:  No dyspnea, cough, tachypnea, wheezing  GI:  + mild pain, No nausea, No vomiting, No diarrhea, No constipation, No weight loss, No fever, No pruritis, No rectal bleeding, No melena, No dysphagia  :  No pain, bleeding, incontinence, nocturia  Muscle:  No pain, weakness  Neuro:  No weakness, tingling, memory problems  Psych:  No fatigue, insomnia, mood problems, depression  Endocrine:  No polyuria, polydypsia, cold/heat intolerance  Heme:  No petechiae, ecchymosis, easy bruisability  Skin:  No rash, tattoos, scars, edema      Vital Signs Last 24 Hrs  T(C): 37.1 (03 Dec 2018 09:38), Max: 37.2 (02 Dec 2018 17:06)  T(F): 98.7 (03 Dec 2018 09:38), Max: 99 (02 Dec 2018 17:06)  HR: 65 (03 Dec 2018 09:38) (65 - 86)  BP: 135/74 (03 Dec 2018 09:38) (108/60 - 135/74)  BP(mean): --  RR: 18 (03 Dec 2018 09:38) (18 - 18)  SpO2: 95% (03 Dec 2018 09:38) (92% - 95%)    PHYSICAL EXAM:    Constitutional: NAD, well-developed  HEENT: EOMI, throat clear  Neck: No LAD, supple  Respiratory: CTA and P  Cardiovascular: S1 and S2, RRR, no M  Gastrointestinal: BS+, soft, NT/ND, neg HSM,  lap dressings are c/d/i,  Extremities: No peripheral edema, neg clubing, cyanosis  Vascular: 2+ peripheral pulses  Neurological: A/O x 3, no focal deficits  Psychiatric: Normal mood, normal affect  Skin: No rashes      LABS:                        11.1   7.0   )-----------( 167      ( 03 Dec 2018 09:09 )             33.3     12-03    138  |  102  |  20  ----------------------------<  113<H>  4.1   |  22  |  1.06    Ca    9.3      03 Dec 2018 06:50  Phos  2.4     12-03  Mg     2.2     12-03    TPro  7.2  /  Alb  3.6  /  TBili  0.5  /  DBili  0.1  /  AST  18  /  ALT  27  /  AlkPhos  74  12-02          RADIOLOGY & ADDITIONAL TESTS:

## 2018-12-03 NOTE — DISCHARGE NOTE ADULT - CARE PROVIDER_API CALL
Jeffrey Vera (DO), Gastroenterology; Internal Medicine  [address] [place] [address]  Phone: [phone]  Fax: [phone]    Davion Mendez (MD), Surgery  [address] [facility] [address]  [address]  [place] [address]  Phone: [phone]  Fax: [phone]

## 2018-12-03 NOTE — DISCHARGE NOTE ADULT - ADDITIONAL INSTRUCTIONS
Please follow up with MARLON Vera in 2-3 weeks (See below for office information to call for an appointment) Please follow up with MARLON Vera in 2-3 weeks (See below for office information to call for an appointment)  Please follow up with the [facility] in 1-2 weeks [phone] for chavez removal- Call for appointment

## 2018-12-03 NOTE — DISCHARGE NOTE ADULT - PLAN OF CARE
return to daily living activity prior to surgery, postoperative recovery WOUND CARE: Leave steri strips in place until they come off on their own.  Please pat area dry. You will be discharged with ARLEEN drains. You will need to empty them and record outputs accurately. This will be taught to you by the nursing staff. Please do not remove the ARLEEN drains. They will be removed in the office. Please bring to the office accurate records of output.   BATHING: Please do not submerge wound underwater. You may shower and/or sponge bathe.  ACTIVITY: No heavy lifting anything more than 10-15lbs or straining. Otherwise, you may return to your usual level of physical activity. If you are taking narcotic pain medication (such as Percocet), do NOT drive a car, operate machinery or make important decisions.  DIET: Low fat diet   NOTIFY YOUR SURGEON IF: You have any bleeding that does not stop, any pus draining from your wound, any fever (over 100.4 F) or chills, persistent nausea/vomiting with inability to tolerate food or liquids, persistent diarrhea, or if your pain is not controlled on your discharge pain medications.  FOLLOW-UP:  1. Please call to make a follow-up appointment within 1-2 weeks of discharge with Dr. Mendez (See below for office information to call for an appointment)   2. Please follow up with your primary care physician in one week regarding your hospitalization. WOUND CARE: Leave steri strips in place until they come off on their own.  Please pat area dry.  BATHING: Please do not submerge wound underwater. You may shower and/or sponge bathe.  ACTIVITY: No heavy lifting anything more than 10-15lbs or straining. Otherwise, you may return to your usual level of physical activity. If you are taking narcotic pain medication (such as Percocet), do NOT drive a car, operate machinery or make important decisions.  DIET: Low fat diet   NOTIFY YOUR SURGEON IF: You have any bleeding that does not stop, any pus draining from your wound, any fever (over 100.4 F) or chills, persistent nausea/vomiting with inability to tolerate food or liquids, persistent diarrhea, or if your pain is not controlled on your discharge pain medications.  FOLLOW-UP:  1. Please call to make a follow-up appointment within 1-2 weeks of discharge with Dr. Mendez (See below for office information to call for an appointment)   2. Please follow up with your primary care physician in one week regarding your hospitalization.

## 2018-12-03 NOTE — DISCHARGE NOTE ADULT - MEDICATION SUMMARY - MEDICATIONS TO TAKE
I will START or STAY ON the medications listed below when I get home from the hospital:    acetaminophen 325 mg oral tablet  -- 2 tab(s) by mouth every 6 hours  -- Indication: For pain    irbesartan 150 mg oral tablet  -- 1 tab(s) by mouth once a day  -- Indication: For  blood pressure    Cozaar  -- 50 milligram(s) by mouth once a day  -- Indication: For  blood pressure    alfuzosin  -- 10 milligram(s) by mouth once a day  -- Indication: For Home medication    omeprazole 20 mg oral delayed release tablet  -- 1 tab(s) by mouth once a day  -- Indication: For reflux

## 2018-12-03 NOTE — PROVIDER CONTACT NOTE (OTHER) - ASSESSMENT
Patient was DTV by 1730. Pt voided in toilet with BM x 2, urine amoun not saved d/t pt refusing to use urinal while on toilet. Pt reports having pelvic discomfort. No s/s of distress.

## 2018-12-03 NOTE — PROVIDER CONTACT NOTE (OTHER) - SITUATION
Patient was DTV by 1730. Pt voided in toilet with BM x 2, urine amoun not saved d/t pt refusing to use urinal while on toilet.

## 2018-12-03 NOTE — PROVIDER CONTACT NOTE (OTHER) - ACTION/TREATMENT ORDERED:
MD Melchor aware. perform post-void bladder scan. No further interventions required. wIll continue to monitor.

## 2018-12-03 NOTE — PROGRESS NOTE ADULT - ASSESSMENT
89 yo male ho dm, htn, a/w acute [place]    pt s/p lap [place]  POD#4  surg followup   pain control  incentive spirometry  ambulation  advance diet as tolerated  iv abx    hypoxia  cxr noted  no acute findings  incentive spirometry  supp o2    DM  on insulin  monitor fs    dvt ppx  dc planning

## 2018-12-03 NOTE — DISCHARGE NOTE ADULT - PATIENT PORTAL LINK FT
You can access the [facility], offered by [facility], by registering with the following website: [email or web address]

## 2018-12-03 NOTE — DISCHARGE NOTE ADULT - CARE PLAN
Principal Discharge DX:	Cholecystitis  Goal:	return to daily living activity prior to surgery, postoperative recovery  Assessment and plan of treatment:	WOUND CARE: Leave steri strips in place until they come off on their own.  Please pat area dry. You will be discharged with ARLEEN drains. You will need to empty them and record outputs accurately. This will be taught to you by the nursing staff. Please do not remove the ARLEEN drains. They will be removed in the office. Please bring to the office accurate records of output.   BATHING: Please do not submerge wound underwater. You may shower and/or sponge bathe.  ACTIVITY: No heavy lifting anything more than 10-15lbs or straining. Otherwise, you may return to your usual level of physical activity. If you are taking narcotic pain medication (such as Percocet), do NOT drive a car, operate machinery or make important decisions.  DIET: Low fat diet   NOTIFY YOUR SURGEON IF: You have any bleeding that does not stop, any pus draining from your wound, any fever (over 100.4 F) or chills, persistent nausea/vomiting with inability to tolerate food or liquids, persistent diarrhea, or if your pain is not controlled on your discharge pain medications.  FOLLOW-UP:  1. Please call to make a follow-up appointment within 1-2 weeks of discharge with Dr. Mendez (See below for office information to call for an appointment)   2. Please follow up with your primary care physician in one week regarding your hospitalization. Principal Discharge DX:	Cholecystitis  Goal:	return to daily living activity prior to surgery, postoperative recovery  Assessment and plan of treatment:	WOUND CARE: Leave steri strips in place until they come off on their own.  Please pat area dry.  BATHING: Please do not submerge wound underwater. You may shower and/or sponge bathe.  ACTIVITY: No heavy lifting anything more than 10-15lbs or straining. Otherwise, you may return to your usual level of physical activity. If you are taking narcotic pain medication (such as Percocet), do NOT drive a car, operate machinery or make important decisions.  DIET: Low fat diet   NOTIFY YOUR SURGEON IF: You have any bleeding that does not stop, any pus draining from your wound, any fever (over 100.4 F) or chills, persistent nausea/vomiting with inability to tolerate food or liquids, persistent diarrhea, or if your pain is not controlled on your discharge pain medications.  FOLLOW-UP:  1. Please call to make a follow-up appointment within 1-2 weeks of discharge with Dr. Mendez (See below for office information to call for an appointment)   2. Please follow up with your primary care physician in one week regarding your hospitalization.

## 2018-12-03 NOTE — PROGRESS NOTE ADULT - SUBJECTIVE AND OBJECTIVE BOX
CHRISTOPH SCHROEDER  89y Male  MRN:[id number]    Patient is a 89y old  Male who presents with a chief complaint of acute cholecystitis (30 Nov 2018 08:31)    CC: abdominal pain, cholecystitis   HPI: 89M h/o gallstone pancreatitis 2009 who presents with 24 hours of abdominal pain, PO intolerance and subsequent ultrasound and CT imaging showing acute cholecystitis w/stones lodged in the neck. Non-ill appearing, RUQ tenderness on exam. Afebrile, non-leukocytotic. No transaminitis, hyperbilirubinemia or elevated alkaline phosphatase. Last meal was >12 hours prior to admission. H/o gastrectomy for gastric ulcer ~60-70 years prior; well-healed large midline incision.     Patient does not have a cardiologist. Can do two flights of stairs without becoming short of breath. Denies current chest pain or shortness of breath.        Medical and Surgical History  Acute Pancreatitis  Diabetes Mellitus Type II  Age Related Macular Degeneration  Essential (Primary) Hypertension  S/P Gastrectomy  Injury of Superficial Femoral Artery: repaired  S/P Tonsillectomy  S/P Exploratory Laparotomy: reportedly for gastric ulcer, unknown procedure  Essential (Primary) Hypertension (29 Nov 2018 10:28)      Patient seen and evaluated at bedside. No acute events overnight except as noted.    Interval HPI: no acute events o/n     PAST MEDICAL & SURGICAL HISTORY:  Acute Pancreatitis  Diabetes Mellitus Type II  Age Related Macular Degeneration  Essential (Primary) Hypertension  S/P Gastrectomy  Injury of Superficial Femoral Artery: repaired  S/P Tonsillectomy  S/P Exploratory Laparotomy: reportedly for gastric ulcer, unknown procedure  Essential (Primary) Hypertension    SOC:  non smoker  no drug or alcohol abuse    fam hx:  non cont    REVIEW OF SYSTEMS:  as per hpi    VITALS:  Vital Signs Last 24 Hrs  T(C): 37.1 (03 Dec 2018 09:38), Max: 37.2 (02 Dec 2018 17:06)  T(F): 98.7 (03 Dec 2018 09:38), Max: 99 (02 Dec 2018 17:06)  HR: 65 (03 Dec 2018 09:38) (65 - 86)  BP: 135/74 (03 Dec 2018 09:38) (108/60 - 135/74)  BP(mean): --  RR: 18 (03 Dec 2018 09:38) (18 - 18)  SpO2: 95% (03 Dec 2018 09:38) (92% - 95%)      PHYSICAL EXAM:  GENERAL: NAD, well-developed  HEAD:  Atraumatic, Normocephalic  EYES: EOMI, PERRLA, conjunctiva and sclera clear  NECK: Supple, No JVD  CHEST/LUNG: dec bs b/l  HEART: S1, S2; No murmurs, rubs, or gallops  ABDOMEN: Soft, mild diffuse ttp, Nondistended; Bowel sounds present. +drain   EXTREMITIES:  2+ Peripheral Pulses, No clubbing, cyanosis, or edema  PSYCH: Normal affect  NEUROLOGY: AAOX3; non-focal  SKIN: No rashes or lesions    Consultant(s) Notes Reviewed:  [x ] YES  [ ] NO  Care Discussed with Consultants/Other Providers [ x] YES  [ ] NO    MEDS:  MEDICATIONS  (STANDING):  acetaminophen   Tablet .. 650 milliGRAM(s) Oral every 6 hours  alfuzosin 10 milliGRAM(s) Oral at bedtime  cefoTEtan  IVPB 2 Gram(s) IV Intermittent every 12 hours  heparin  Injectable 5000 Unit(s) SubCutaneous every 8 hours  insulin lispro (HumaLOG) corrective regimen sliding scale   SubCutaneous three times a day before meals  losartan 50 milliGRAM(s) Oral daily  pantoprazole    Tablet 40 milliGRAM(s) Oral daily  sodium phosphate IVPB 15 milliMole(s) IV Intermittent once    MEDICATIONS  (PRN):  glucagon  Injectable 1 milliGRAM(s) IntraMuscular once PRN Glucose LESS THAN 70 milligrams/deciliter      ALLERGIES:  No Known Allergies      LABS:                                          11.1   7.0   )-----------( 167      ( 03 Dec 2018 09:09 )             33.3   12-03    138  |  102  |  20  ----------------------------<  113<H>  4.1   |  22  |  1.06    Ca    9.3      03 Dec 2018 06:50  Phos  2.4     12-03  Mg     2.2     12-03    TPro  7.2  /  Alb  3.6  /  TBili  0.5  /  DBili  0.1  /  AST  18  /  ALT  27  /  AlkPhos  74  12-02      < from: Xray Chest 1 View- PORTABLE-Urgent (12.01.18 @ 19:11) >  IMPRESSION: Low lung volumes. Cardiomegaly. Right basilar atelectasis.    < end of copied text >       < from: CT Abdomen and Pelvis w/ IV Cont (11.29.18 @ 08:51) >  IMPRESSION:     Dilated gallbladder containing stones and sludge with adjacent fat   infiltration suspicious for acute cholecystitis.    < end of copied text >

## 2018-12-03 NOTE — DISCHARGE NOTE ADULT - NS AS ACTIVITY OBS
No Heavy lifting/straining/Do not make important decisions/not while taking narcotic pain medication/Do not drive or operate machinery

## 2018-12-03 NOTE — PROGRESS NOTE ADULT - SUBJECTIVE AND OBJECTIVE BOX
Red Team Surgery Progress Note    SUBJECTIVE: Patient seen and examined at the bedside. No acute events overnight. Patient was found to be retaining urine yesterday, a chavez was placed, a stat dose of flomax was given and he was restarted on his home sustained release BPH medication. Feeling well this morning. Tolerating regular diet without nausea or vomiting. Pain is well controlled. Is passing flatus, last bowel movement was day before yesterday. Ambulating well.     VITALS  T(C): 36.4 (12-03-18 @ 01:12), Max: 37.2 (12-02-18 @ 17:06)  HR: 72 (12-03-18 @ 01:12) (71 - 88)  BP: 133/72 (12-03-18 @ 01:12) (108/60 - 159/83)  RR: 18 (12-03-18 @ 01:12) (18 - 18)  SpO2: 95% (12-03-18 @ 01:12) (92% - 95%)  CAPILLARY BLOOD GLUCOSE      POCT Blood Glucose.: 122 mg/dL (02 Dec 2018 23:41)  POCT Blood Glucose.: 152 mg/dL (02 Dec 2018 18:20)  POCT Blood Glucose.: 183 mg/dL (02 Dec 2018 14:26)  POCT Blood Glucose.: 118 mg/dL (02 Dec 2018 09:48)    Is/Os    12-01 @ 07:01  -  12-02 @ 07:00  --------------------------------------------------------  IN:    IV PiggyBack: 350 mL    Oral Fluid: 420 mL  Total IN: 770 mL    OUT:    Bulb: 80 mL    Voided: 1350 mL  Total OUT: 1430 mL    Total NET: -660 mL      12-02 @ 07:01  -  12-03 @ 05:29  --------------------------------------------------------  IN:    Oral Fluid: 640 mL  Total IN: 640 mL    OUT:    Bulb: 35 mL    Indwelling Catheter - Urethral: 2200 mL    Voided: 100 mL  Total OUT: 2335 mL    Total NET: -1695 mL    PHYSICAL EXAM:   General: NAD, Lying in bed comfortably, alert, oriented x3  Pulm: Non-labored breathing on 2L NC  GI/Abd: Soft, mild suprapubic tenderness and distension, no rebound/guarding, elizabeth drain with s/s output, clots in tubing difficult to strip, suggesting that it is clogged, lap dressings are c/d/i, some bruising surrounding the infra-umbilical incision    MEDICATIONS (STANDING): acetaminophen   Tablet .. 650 milliGRAM(s) Oral every 6 hours  alfuzosin 10 milliGRAM(s) Oral at bedtime  cefoTEtan  IVPB 2 Gram(s) IV Intermittent every 12 hours  heparin  Injectable 5000 Unit(s) SubCutaneous every 8 hours  insulin lispro (HumaLOG) corrective regimen sliding scale   SubCutaneous three times a day before meals  losartan 50 milliGRAM(s) Oral daily  pantoprazole    Tablet 40 milliGRAM(s) Oral daily    MEDICATIONS (PRN):glucagon  Injectable 1 milliGRAM(s) IntraMuscular once PRN Glucose LESS THAN 70 milligrams/deciliter    LABS  CBC (12-02 @ 09:21)                              12.0<L>                         11.14<H>  )----------------(  190        --    % Neutrophils, --    % Lymphocytes, ANC: --                                  37.0<L>    BMP (12-02 @ 07:27)             136     |  97      |  30<H> 		Ca++ --      Ca 9.7                ---------------------------------( 120<H>		Mg 2.2                3.8     |  20<L>   |  1.32<H>			Ph 3.3       LFTs (12-02 @ 07:27)      TPro 7.2 / Alb 3.6 / TBili 0.5 / DBili 0.1 / AST 18 / ALT 27 / AlkPhos 74

## 2018-12-04 LAB — GLUCOSE BLDC GLUCOMTR-MCNC: 111 MG/DL — HIGH (ref 70–99)

## 2018-12-04 RX ADMIN — HEPARIN SODIUM 5000 UNIT(S): 5000 INJECTION INTRAVENOUS; SUBCUTANEOUS at 05:32

## 2018-12-04 RX ADMIN — Medication 650 MILLIGRAM(S): at 00:19

## 2018-12-04 RX ADMIN — Medication 650 MILLIGRAM(S): at 12:40

## 2018-12-04 RX ADMIN — Medication 650 MILLIGRAM(S): at 20:59

## 2018-12-04 RX ADMIN — PANTOPRAZOLE SODIUM 40 MILLIGRAM(S): 20 TABLET, DELAYED RELEASE ORAL at 12:41

## 2018-12-04 RX ADMIN — Medication 650 MILLIGRAM(S): at 00:49

## 2018-12-04 RX ADMIN — HEPARIN SODIUM 5000 UNIT(S): 5000 INJECTION INTRAVENOUS; SUBCUTANEOUS at 20:59

## 2018-12-04 RX ADMIN — ALFUZOSIN HYDROCHLORIDE 10 MILLIGRAM(S): 10 TABLET, EXTENDED RELEASE ORAL at 20:58

## 2018-12-04 RX ADMIN — LOSARTAN POTASSIUM 50 MILLIGRAM(S): 100 TABLET, FILM COATED ORAL at 05:32

## 2018-12-04 RX ADMIN — Medication 650 MILLIGRAM(S): at 21:30

## 2018-12-04 RX ADMIN — Medication 650 MILLIGRAM(S): at 05:32

## 2018-12-04 RX ADMIN — Medication 650 MILLIGRAM(S): at 06:02

## 2018-12-04 NOTE — PROGRESS NOTE ADULT - SUBJECTIVE AND OBJECTIVE BOX
Subjective: Patient seen and examined. No new events except as noted.     REVIEW OF SYSTEMS:    CONSTITUTIONAL: No weakness, fevers or chills  EYES/ENT: No visual changes;  No vertigo or throat pain   NECK: No pain or stiffness  RESPIRATORY: No cough, wheezing, hemoptysis; No shortness of breath  CARDIOVASCULAR: No chest pain or palpitations  GASTROINTESTINAL: No abdominal or epigastric pain. No nausea, vomiting, or hematemesis; No diarrhea or constipation. No melena or hematochezia.  GENITOURINARY: No dysuria, frequency or hematuria  NEUROLOGICAL: No numbness or weakness  SKIN: No itching, burning, rashes, or lesions   All other review of systems is negative unless indicated above.    MEDICATIONS:  MEDICATIONS  (STANDING):  acetaminophen   Tablet .. 650 milliGRAM(s) Oral every 6 hours  alfuzosin 10 milliGRAM(s) Oral at bedtime  heparin  Injectable 5000 Unit(s) SubCutaneous every 8 hours  losartan 50 milliGRAM(s) Oral daily  pantoprazole    Tablet 40 milliGRAM(s) Oral daily      PHYSICAL EXAM:  T(C): 36.8 (12-04-18 @ 16:51), Max: 37.3 (12-03-18 @ 21:55)  HR: 73 (12-04-18 @ 16:51) (60 - 94)  BP: 128/61 (12-04-18 @ 16:51) (116/54 - 130/68)  RR: 18 (12-04-18 @ 16:51) (18 - 18)  SpO2: 93% (12-04-18 @ 16:51) (93% - 94%)  Wt(kg): --  I&O's Summary    03 Dec 2018 07:01  -  04 Dec 2018 07:00  --------------------------------------------------------  IN: 740 mL / OUT: 1795 mL / NET: -1055 mL    04 Dec 2018 07:01  -  04 Dec 2018 20:42  --------------------------------------------------------  IN: 400 mL / OUT: 620 mL / NET: -220 mL          Appearance: Normal	  HEENT:   Normal oral mucosa, PERRL, EOMI	  Lymphatic: No lymphadenopathy , no edema  Cardiovascular: Normal S1 S2, No JVD, No murmurs , Peripheral pulses palpable 2+ bilaterally  Respiratory: Lungs clear to auscultation, normal effort 	  Gastrointestinal:  Soft, +tender, + BS	  Skin: No rashes, No ecchymoses, No cyanosis, warm to touch  Musculoskeletal: Normal range of motion, normal strength  Psychiatry:  Mood & affect appropriate  Ext: No edema      LABS:    CARDIAC MARKERS:                                11.1   7.0   )-----------( 167      ( 03 Dec 2018 09:09 )             33.3     12-03    138  |  102  |  20  ----------------------------<  113<H>  4.1   |  22  |  1.06    Ca    9.3      03 Dec 2018 06:50  Phos  2.4     12-03  Mg     2.2     12-03      proBNP:   Lipid Profile:   HgA1c:   TSH:             TELEMETRY: 	    ECG:  	  RADIOLOGY:   DIAGNOSTIC TESTING:  [ ] Echocardiogram:  [ ]  Catheterization:  [ ] Stress Test:    OTHER:

## 2018-12-04 NOTE — PROGRESS NOTE ADULT - ASSESSMENT
89 yo male ho dm, htn, a/w acute [place]    pt s/p lap [place]  POD#5  surg followup   pain control  incentive spirometry  ambulation  advance diet as tolerated  abx as per surg  drain mngt    hypoxia  cxr noted  no acute findings  incentive spirometry  supp o2    DM  on insulin  monitor fs    failed TOV  cont cahvez  alfuzosin as ordered     dvt ppx  dc planning

## 2018-12-04 NOTE — PROGRESS NOTE ADULT - SUBJECTIVE AND OBJECTIVE BOX
Red Team Surgery Progress Note    SUBJECTIVE: Patient seen and examined at the bedside. Patient had had minimal voids yesterday and bladder scan at 7 PM showed >800 in the bladder. Straight cath drained 900 cc of urine. Patient was due to void at 3 AM but had not voided so a new chavez was placed. Feeling well this morning. Tolerating regular diet without nausea or vomiting. Pain is well controlled. Has passed flatus and bowel movements. Ambulating well.     VITALS  T(C): 37 (12-04-18 @ 01:10), Max: 37.3 (12-03-18 @ 21:55)  HR: 90 (12-04-18 @ 01:10) (58 - 94)  BP: 119/65 (12-04-18 @ 01:10) (111/67 - 136/74)  RR: 18 (12-04-18 @ 01:10) (18 - 18)  SpO2: 94% (12-04-18 @ 01:10) (94% - 95%)  CAPILLARY BLOOD GLUCOSE      POCT Blood Glucose.: 166 mg/dL (03 Dec 2018 12:55)  POCT Blood Glucose.: 120 mg/dL (03 Dec 2018 09:36)    Is/Os    12-02 @ 07:01  -  12-03 @ 07:00  --------------------------------------------------------  IN:    IV PiggyBack: 50 mL    Oral Fluid: 640 mL  Total IN: 690 mL    OUT:    Bulb: 55 mL    Indwelling Catheter - Urethral: 2800 mL    Voided: 100 mL  Total OUT: 2955 mL    Total NET: -2265 mL      12-03 @ 07:01  -  12-04 @ 05:39  --------------------------------------------------------  IN:    IV PiggyBack: 250 mL    Oral Fluid: 440 mL    Solution: 50 mL  Total IN: 740 mL    OUT:    Bulb: 20 mL    Indwelling Catheter - Urethral: 375 mL    Intermittent Catheterization - Urethral: 900 mL  Total OUT: 1295 mL    Total NET: -555 mL    PHYSICAL EXAM:   General: NAD, Lying in bed comfortably, alert, oriented x3  Pulm: Non-labored breathing on 2L NC  GI/Abd: Soft, mild suprapubic tenderness and distension, no rebound/guarding, elizabeth drain with s/s output, clots in tubing difficult to strip, suggesting that it is clogged, lap dressings are c/d/i, some bruising surrounding the infra-umbilical incision    MEDICATIONS (STANDING): acetaminophen   Tablet .. 650 milliGRAM(s) Oral every 6 hours  alfuzosin 10 milliGRAM(s) Oral at bedtime  heparin  Injectable 5000 Unit(s) SubCutaneous every 8 hours  losartan 50 milliGRAM(s) Oral daily  pantoprazole    Tablet 40 milliGRAM(s) Oral daily    MEDICATIONS (PRN):  LABS  CBC (12-03 @ 09:09)                              11.1<L>                         7.0     )----------------(  167        --    % Neutrophils, --    % Lymphocytes, ANC: --                                  33.3<L>  CBC (12-03 @ 07:48)                              10.9<L>                         7.58    )----------------(  183        --    % Neutrophils, --    % Lymphocytes, ANC: --                                  33.1<L>    BMP (12-03 @ 06:50)             138     |  102     |  20    		Ca++ --      Ca 9.3                ---------------------------------( 113<H>		Mg 2.2                4.1     |  22      |  1.06  			Ph 2.4<L>

## 2018-12-04 NOTE — PROVIDER CONTACT NOTE (OTHER) - BACKGROUND
s/p Naga. Payton and SUKHWINDER. Marinelli removed 12/3, pt DTV at 1730, no urine output; pt straight cathed at 1900, urine output of 900cc.

## 2018-12-04 NOTE — PROGRESS NOTE ADULT - ASSESSMENT
89M POD 5 s/p laparoscopic cholecystectomy for acute cholecystitis with urinary retention requiring chavez replacement    - Diet: low fat  - Pain control as needed  - drain teaching  - cont chavez, likely dc to rehab with chavez  - restart BPH medication - alfuzosin 10mg   - D/c planning to [facility] with antibiotics

## 2018-12-04 NOTE — PROGRESS NOTE ADULT - SUBJECTIVE AND OBJECTIVE BOX
Interval Events:  Pt seen and examined He is tolerating regular diet without nausea or vomiting. Pain is controlled.   + flatus and bm    MEDICATIONS  (STANDING):  acetaminophen   Tablet .. 650 milliGRAM(s) Oral every 6 hours  alfuzosin 10 milliGRAM(s) Oral at bedtime  heparin  Injectable 5000 Unit(s) SubCutaneous every 8 hours  losartan 50 milliGRAM(s) Oral daily  pantoprazole    Tablet 40 milliGRAM(s) Oral daily    MEDICATIONS  (PRN):      Allergies    No Known Allergies    Intolerances        Review of Systems:    General:  No wt loss, fevers, chills, night sweats,fatigue,   Eyes:  Good vision, no reported pain  ENT:  No sore throat, pain, runny nose, dysphagia  CV:  No pain, palpitations, hypo/hypertension  Resp:  No dyspnea, cough, tachypnea, wheezing  GI:  No pain, No nausea, No vomiting, No diarrhea, No constipation, No weight loss, No fever, No pruritis, No rectal bleeding, No melena, No dysphagia  :  No pain, bleeding, incontinence, nocturia  Muscle:  No pain, weakness  Neuro:  No weakness, tingling, memory problems  Psych:  No fatigue, insomnia, mood problems, depression  Endocrine:  No polyuria, polydypsia, cold/heat intolerance  Heme:  No petechiae, ecchymosis, easy bruisability  Skin:  No rash, tattoos, scars, edema      Vital Signs Last 24 Hrs  T(C): 36.3 (04 Dec 2018 11:00), Max: 37.3 (03 Dec 2018 21:55)  T(F): 97.4 (04 Dec 2018 11:00), Max: 99.2 (03 Dec 2018 21:55)  HR: 72 (04 Dec 2018 11:00) (58 - 94)  BP: 124/69 (04 Dec 2018 11:00) (111/67 - 136/74)  BP(mean): --  RR: 18 (04 Dec 2018 11:00) (18 - 18)  SpO2: 94% (04 Dec 2018 11:00) (94% - 95%)    PHYSICAL EXAM:    Constitutional: NAD, well-developed  HEENT: EOMI, throat clear  Neck: No LAD, supple  Respiratory: CTA and P  Cardiovascular: S1 and S2, RRR, no M  Gastrointestinal: BS+, soft, NT/ND, neg HSM,  Extremities: No peripheral edema, neg clubing, cyanosis  Vascular: 2+ peripheral pulses  Neurological: A/O x 3, no focal deficits  Psychiatric: Normal mood, normal affect  Skin: No rashes      LABS:                        11.1   7.0   )-----------( 167      ( 03 Dec 2018 09:09 )             33.3     12-03    138  |  102  |  20  ----------------------------<  113<H>  4.1   |  22  |  1.06    Ca    9.3      03 Dec 2018 06:50  Phos  2.4     12-03  Mg     2.2     12-03            RADIOLOGY & ADDITIONAL TESTS:

## 2018-12-04 NOTE — PROVIDER CONTACT NOTE (OTHER) - ASSESSMENT
pt given urinal; pt stated no urge to urinate. no c/o of pain. no s/s of distress. bladder scan completed 580cc shown.

## 2018-12-04 NOTE — PROGRESS NOTE ADULT - SUBJECTIVE AND OBJECTIVE BOX
CHRISTOPH SCHROEDER  89y Male  MRN:[id number]    Patient is a 89y old  Male who presents with a chief complaint of acute cholecystitis (30 Nov 2018 08:31)    CC: abdominal pain, cholecystitis   HPI: 89M h/o gallstone pancreatitis 2009 who presents with 24 hours of abdominal pain, PO intolerance and subsequent ultrasound and CT imaging showing acute cholecystitis w/stones lodged in the neck. Non-ill appearing, RUQ tenderness on exam. Afebrile, non-leukocytotic. No transaminitis, hyperbilirubinemia or elevated alkaline phosphatase. Last meal was >12 hours prior to admission. H/o gastrectomy for gastric ulcer ~60-70 years prior; well-healed large midline incision.     Patient does not have a cardiologist. Can do two flights of stairs without becoming short of breath. Denies current chest pain or shortness of breath.        Medical and Surgical History  Acute Pancreatitis  Diabetes Mellitus Type II  Age Related Macular Degeneration  Essential (Primary) Hypertension  S/P Gastrectomy  Injury of Superficial Femoral Artery: repaired  S/P Tonsillectomy  S/P Exploratory Laparotomy: reportedly for gastric ulcer, unknown procedure  Essential (Primary) Hypertension (29 Nov 2018 10:28)      Patient seen and evaluated at bedside. No acute events overnight except as noted.    Interval HPI: no acute events o/n     PAST MEDICAL & SURGICAL HISTORY:  Acute Pancreatitis  Diabetes Mellitus Type II  Age Related Macular Degeneration  Essential (Primary) Hypertension  S/P Gastrectomy  Injury of Superficial Femoral Artery: repaired  S/P Tonsillectomy  S/P Exploratory Laparotomy: reportedly for gastric ulcer, unknown procedure  Essential (Primary) Hypertension    SOC:  non smoker  no drug or alcohol abuse    fam hx:  non cont    REVIEW OF SYSTEMS:  as per hpi    VITALS:  Vital Signs Last 24 Hrs  T(C): 36.3 (04 Dec 2018 11:00), Max: 37.3 (03 Dec 2018 21:55)  T(F): 97.4 (04 Dec 2018 11:00), Max: 99.2 (03 Dec 2018 21:55)  HR: 72 (04 Dec 2018 11:00) (58 - 94)  BP: 124/69 (04 Dec 2018 11:00) (111/67 - 136/74)  BP(mean): --  RR: 18 (04 Dec 2018 11:00) (18 - 18)  SpO2: 94% (04 Dec 2018 11:00) (94% - 95%)      PHYSICAL EXAM:  GENERAL: NAD, well-developed  HEAD:  Atraumatic, Normocephalic  EYES: EOMI, PERRLA, conjunctiva and sclera clear  NECK: Supple, No JVD  CHEST/LUNG: dec bs b/l  HEART: S1, S2; No murmurs, rubs, or gallops  ABDOMEN: Soft, mild diffuse ttp, Nondistended; Bowel sounds present. +drain   EXTREMITIES:  2+ Peripheral Pulses, No clubbing, cyanosis, or edema  PSYCH: Normal affect  NEUROLOGY: AAOX3; non-focal  SKIN: No rashes or lesions    Consultant(s) Notes Reviewed:  [x ] YES  [ ] NO  Care Discussed with Consultants/Other Providers [ x] YES  [ ] NO    MEDS:  MEDICATIONS  (STANDING):  acetaminophen   Tablet .. 650 milliGRAM(s) Oral every 6 hours  alfuzosin 10 milliGRAM(s) Oral at bedtime  heparin  Injectable 5000 Unit(s) SubCutaneous every 8 hours  losartan 50 milliGRAM(s) Oral daily  pantoprazole    Tablet 40 milliGRAM(s) Oral daily    MEDICATIONS  (PRN):      ALLERGIES:  No Known Allergies      LABS:                                                  11.1   7.0   )-----------( 167      ( 03 Dec 2018 09:09 )             33.3   12-03    138  |  102  |  20  ----------------------------<  113<H>  4.1   |  22  |  1.06    Ca    9.3      03 Dec 2018 06:50  Phos  2.4     12-03  Mg     2.2     12-03        < from: Xray Chest 1 View- PORTABLE-Urgent (12.01.18 @ 19:11) >  IMPRESSION: Low lung volumes. Cardiomegaly. Right basilar atelectasis.    < end of copied text >       < from: CT Abdomen and Pelvis w/ IV Cont (11.29.18 @ 08:51) >  IMPRESSION:     Dilated gallbladder containing stones and sludge with adjacent fat   infiltration suspicious for acute cholecystitis.    < end of copied text >

## 2018-12-05 LAB — SURGICAL PATHOLOGY STUDY: SIGNIFICANT CHANGE UP

## 2018-12-05 RX ADMIN — Medication 650 MILLIGRAM(S): at 04:45

## 2018-12-05 RX ADMIN — LOSARTAN POTASSIUM 50 MILLIGRAM(S): 100 TABLET, FILM COATED ORAL at 04:44

## 2018-12-05 RX ADMIN — Medication 650 MILLIGRAM(S): at 05:15

## 2018-12-05 RX ADMIN — Medication 650 MILLIGRAM(S): at 17:35

## 2018-12-05 RX ADMIN — Medication 650 MILLIGRAM(S): at 02:15

## 2018-12-05 RX ADMIN — ALFUZOSIN HYDROCHLORIDE 10 MILLIGRAM(S): 10 TABLET, EXTENDED RELEASE ORAL at 21:26

## 2018-12-05 RX ADMIN — HEPARIN SODIUM 5000 UNIT(S): 5000 INJECTION INTRAVENOUS; SUBCUTANEOUS at 21:27

## 2018-12-05 RX ADMIN — PANTOPRAZOLE SODIUM 40 MILLIGRAM(S): 20 TABLET, DELAYED RELEASE ORAL at 14:07

## 2018-12-05 RX ADMIN — HEPARIN SODIUM 5000 UNIT(S): 5000 INJECTION INTRAVENOUS; SUBCUTANEOUS at 14:08

## 2018-12-05 RX ADMIN — Medication 650 MILLIGRAM(S): at 21:26

## 2018-12-05 NOTE — PROGRESS NOTE ADULT - SUBJECTIVE AND OBJECTIVE BOX
Interval Events: Pt seen and examined. He is tolerating regular diet   no n/v  + flatus and bm    MEDICATIONS  (STANDING):  acetaminophen   Tablet .. 650 milliGRAM(s) Oral every 6 hours  alfuzosin 10 milliGRAM(s) Oral at bedtime  heparin  Injectable 5000 Unit(s) SubCutaneous every 8 hours  losartan 50 milliGRAM(s) Oral daily  pantoprazole    Tablet 40 milliGRAM(s) Oral daily    MEDICATIONS  (PRN):      Allergies    No Known Allergies    Intolerances        Review of Systems:    General:  No wt loss, fevers, chills, night sweats,fatigue,   Eyes:  Good vision, no reported pain  ENT:  No sore throat, pain, runny nose, dysphagia  CV:  No pain, palpitations, hypo/hypertension  Resp:  No dyspnea, cough, tachypnea, wheezing  GI:  No pain, No nausea, No vomiting, No diarrhea, No constipation, No weight loss, No fever, No pruritis, No rectal bleeding, No melena, No dysphagia  :  No pain, bleeding, incontinence, nocturia  Muscle:  No pain, weakness  Neuro:  No weakness, tingling, memory problems  Psych:  No fatigue, insomnia, mood problems, depression  Endocrine:  No polyuria, polydypsia, cold/heat intolerance  Heme:  No petechiae, ecchymosis, easy bruisability  Skin:  No rash, tattoos, scars, edema      Vital Signs Last 24 Hrs  T(C): 36.7 (05 Dec 2018 13:33), Max: 36.9 (05 Dec 2018 10:19)  T(F): 98 (05 Dec 2018 13:33), Max: 98.4 (05 Dec 2018 10:19)  HR: 73 (05 Dec 2018 13:33) (73 - 80)  BP: 129/68 (05 Dec 2018 13:33) (123/65 - 144/68)  BP(mean): --  RR: 18 (05 Dec 2018 13:33) (18 - 18)  SpO2: 93% (05 Dec 2018 13:33) (93% - 95%)    PHYSICAL EXAM:    Constitutional: NAD, well-developed  HEENT: EOMI, throat clear  Neck: No LAD, supple  Respiratory: CTA and P  Cardiovascular: S1 and S2, RRR, no M  Gastrointestinal: BS+, soft, NT/ND, neg HSM,  Extremities: No peripheral edema, neg clubing, cyanosis  Vascular: 2+ peripheral pulses  Neurological: A/O x 3, no focal deficits  Psychiatric: Normal mood, normal affect  Skin: No rashes      LABS:                RADIOLOGY & ADDITIONAL TESTS:

## 2018-12-05 NOTE — PROGRESS NOTE ADULT - SUBJECTIVE AND OBJECTIVE BOX
Red Team Surgery Progress Note    SUBJECTIVE: Patient seen and examined at the bedside.  No acute events overnight.  Feeling well this morning.  Tolerating regular diet without nausea or vomiting. Pain is well controlled. Has passed flatus and bowel movements. Ambulating well.     Vital Signs Last 24 Hrs  T(C): 36.6 (05 Dec 2018 01:03), Max: 36.8 (04 Dec 2018 16:51)  T(F): 97.8 (05 Dec 2018 01:03), Max: 98.3 (04 Dec 2018 16:51)  HR: 79 (05 Dec 2018 01:03) (60 - 80)  BP: 136/76 (05 Dec 2018 01:03) (123/65 - 136/76)  BP(mean): --  RR: 18 (05 Dec 2018 01:03) (18 - 18)  SpO2: 93% (05 Dec 2018 01:03) (93% - 94%)    I&O's Detail    03 Dec 2018 07:01  -  04 Dec 2018 07:00  --------------------------------------------------------  IN:    IV PiggyBack: 250 mL    Oral Fluid: 440 mL    Solution: 50 mL  Total IN: 740 mL    OUT:    Bulb: 20 mL    Indwelling Catheter - Urethral: 375 mL    Intermittent Catheterization - Urethral: 1400 mL  Total OUT: 1795 mL    Total NET: -1055 mL      04 Dec 2018 07:01  -  05 Dec 2018 03:26  --------------------------------------------------------  IN:    Oral Fluid: 640 mL  Total IN: 640 mL    OUT:    Bulb: 30 mL    Indwelling Catheter - Urethral: 1650 mL  Total OUT: 1680 mL    Total NET: -1040 mL          PHYSICAL EXAM:   General: NAD, Lying in bed comfortably, alert, oriented x3  Pulm: Non-labored breathing on 2L NC  GI/Abd: Soft, mild suprapubic tenderness and distension, no rebound/guarding, elizabeth drain with s/s output, clots in tubing difficult to strip, suggesting that it is clogged, lap dressings are c/d/i, some bruising surrounding the infra-umbilical incision    MEDICATIONS (STANDING): acetaminophen   Tablet .. 650 milliGRAM(s) Oral every 6 hours  alfuzosin 10 milliGRAM(s) Oral at bedtime  heparin  Injectable 5000 Unit(s) SubCutaneous every 8 hours  losartan 50 milliGRAM(s) Oral daily  pantoprazole    Tablet 40 milliGRAM(s) Oral daily    MEDICATIONS (PRN):  LABS                        11.1   7.0   )-----------( 167      ( 03 Dec 2018 09:09 )             33.3     12-03    138  |  102  |  20  ----------------------------<  113<H>  4.1   |  22  |  1.06    Ca    9.3      03 Dec 2018 06:50  Phos  2.4     12-03  Mg     2.2     12-03

## 2018-12-05 NOTE — PROGRESS NOTE ADULT - SUBJECTIVE AND OBJECTIVE BOX
Subjective: Patient seen and examined. No new events except as noted.   resting comfortably in bed     REVIEW OF SYSTEMS:    CONSTITUTIONAL: + weakness, fevers or chills  EYES/ENT: No visual changes;  No vertigo or throat pain   NECK: No pain or stiffness  RESPIRATORY: No cough, wheezing, hemoptysis; No shortness of breath  CARDIOVASCULAR: No chest pain or palpitations  GASTROINTESTINAL: No abdominal or epigastric pain. No nausea, vomiting, or hematemesis; No diarrhea or constipation. No melena or hematochezia.  GENITOURINARY: No dysuria, frequency or hematuria  NEUROLOGICAL: No numbness or weakness  SKIN: No itching, burning, rashes, or lesions   All other review of systems is negative unless indicated above.    MEDICATIONS:  MEDICATIONS  (STANDING):  acetaminophen   Tablet .. 650 milliGRAM(s) Oral every 6 hours  alfuzosin 10 milliGRAM(s) Oral at bedtime  heparin  Injectable 5000 Unit(s) SubCutaneous every 8 hours  losartan 50 milliGRAM(s) Oral daily  pantoprazole    Tablet 40 milliGRAM(s) Oral daily      PHYSICAL EXAM:  T(C): 36.7 (12-05-18 @ 06:32), Max: 36.8 (12-04-18 @ 16:51)  HR: 79 (12-05-18 @ 06:32) (72 - 80)  BP: 144/68 (12-05-18 @ 06:32) (123/65 - 144/68)  RR: 18 (12-05-18 @ 06:32) (18 - 18)  SpO2: 94% (12-05-18 @ 06:32) (93% - 94%)  Wt(kg): --  I&O's Summary    04 Dec 2018 07:01  -  05 Dec 2018 07:00  --------------------------------------------------------  IN: 640 mL / OUT: 2235 mL / NET: -1595 mL          Appearance: NAD	  HEENT:   Normal oral mucosa, PERRL, EOMI	  Lymphatic: No lymphadenopathy , no edema  Cardiovascular: Normal S1 S2, No JVD, No murmurs , Peripheral pulses palpable 2+ bilaterally  Respiratory: Lungs clear to auscultation, normal effort 	  Gastrointestinal:  Soft, Non-tender, + BS	  Skin: No rashes, No ecchymoses, No cyanosis, warm to touch  Musculoskeletal: Normal range of motion, normal strength  Psychiatry:  Mood & affect appropriate  Ext: No edema      LABS:    CARDIAC MARKERS:                  proBNP:   Lipid Profile:   HgA1c:   TSH:             TELEMETRY: 	    ECG:  	  RADIOLOGY:   DIAGNOSTIC TESTING:  [ ] Echocardiogram:  [ ]  Catheterization:  [ ] Stress Test:    OTHER:

## 2018-12-05 NOTE — PROGRESS NOTE ADULT - PROBLEM SELECTOR PLAN 4
Incentive spirometry
Supplemental oxygen   Check CXR
Supplemental oxygen   Check CXR

## 2018-12-05 NOTE — PROGRESS NOTE ADULT - SUBJECTIVE AND OBJECTIVE BOX
CHRISTOPH SCHROEDER  89y Male  MRN:[id number]    Patient is a 89y old  Male who presents with a chief complaint of acute cholecystitis (30 Nov 2018 08:31)    CC: abdominal pain, cholecystitis   HPI: 89M h/o gallstone pancreatitis 2009 who presents with 24 hours of abdominal pain, PO intolerance and subsequent ultrasound and CT imaging showing acute cholecystitis w/stones lodged in the neck. Non-ill appearing, RUQ tenderness on exam. Afebrile, non-leukocytotic. No transaminitis, hyperbilirubinemia or elevated alkaline phosphatase. Last meal was >12 hours prior to admission. H/o gastrectomy for gastric ulcer ~60-70 years prior; well-healed large midline incision.     Patient does not have a cardiologist. Can do two flights of stairs without becoming short of breath. Denies current chest pain or shortness of breath.        Medical and Surgical History  Acute Pancreatitis  Diabetes Mellitus Type II  Age Related Macular Degeneration  Essential (Primary) Hypertension  S/P Gastrectomy  Injury of Superficial Femoral Artery: repaired  S/P Tonsillectomy  S/P Exploratory Laparotomy: reportedly for gastric ulcer, unknown procedure  Essential (Primary) Hypertension (29 Nov 2018 10:28)      Patient seen and evaluated at bedside. No acute events overnight except as noted.    Interval HPI: no acute events o/n     PAST MEDICAL & SURGICAL HISTORY:  Acute Pancreatitis  Diabetes Mellitus Type II  Age Related Macular Degeneration  Essential (Primary) Hypertension  S/P Gastrectomy  Injury of Superficial Femoral Artery: repaired  S/P Tonsillectomy  S/P Exploratory Laparotomy: reportedly for gastric ulcer, unknown procedure  Essential (Primary) Hypertension    SOC:  non smoker  no drug or alcohol abuse    fam hx:  non cont    REVIEW OF SYSTEMS:  as per hpi    VITALS:  Vital Signs Last 24 Hrs  T(C): 36.7 (05 Dec 2018 13:33), Max: 36.9 (05 Dec 2018 10:19)  T(F): 98 (05 Dec 2018 13:33), Max: 98.4 (05 Dec 2018 10:19)  HR: 73 (05 Dec 2018 13:33) (73 - 80)  BP: 129/68 (05 Dec 2018 13:33) (123/65 - 144/68)  BP(mean): --  RR: 18 (05 Dec 2018 13:33) (18 - 18)  SpO2: 93% (05 Dec 2018 13:33) (93% - 95%)    PHYSICAL EXAM:  GENERAL: NAD, well-developed  HEAD:  Atraumatic, Normocephalic  EYES: EOMI, PERRLA, conjunctiva and sclera clear  NECK: Supple, No JVD  CHEST/LUNG: dec bs b/l  HEART: S1, S2; No murmurs, rubs, or gallops  ABDOMEN: Soft, mild diffuse ttp, Nondistended; Bowel sounds present. +drain   EXTREMITIES:  2+ Peripheral Pulses, No clubbing, cyanosis, or edema  PSYCH: Normal affect  NEUROLOGY: AAOX3; non-focal  SKIN: No rashes or lesions    Consultant(s) Notes Reviewed:  [x ] YES  [ ] NO  Care Discussed with Consultants/Other Providers [ x] YES  [ ] NO    MEDS:  MEDICATIONS  (STANDING):  acetaminophen   Tablet .. 650 milliGRAM(s) Oral every 6 hours  alfuzosin 10 milliGRAM(s) Oral at bedtime  heparin  Injectable 5000 Unit(s) SubCutaneous every 8 hours  losartan 50 milliGRAM(s) Oral daily  pantoprazole    Tablet 40 milliGRAM(s) Oral daily    MEDICATIONS  (PRN):        ALLERGIES:  No Known Allergies      LABS:                     pending      < from: Xray Chest 1 View- PORTABLE-Urgent (12.01.18 @ 19:11) >  IMPRESSION: Low lung volumes. Cardiomegaly. Right basilar atelectasis.    < end of copied text >       < from: CT Abdomen and Pelvis w/ IV Cont (11.29.18 @ 08:51) >  IMPRESSION:     Dilated gallbladder containing stones and sludge with adjacent fat   infiltration suspicious for acute cholecystitis.    < end of copied text >

## 2018-12-05 NOTE — PROGRESS NOTE ADULT - ASSESSMENT
89M POD 6 s/p laparoscopic cholecystectomy for acute cholecystitis with urinary retention requiring chavez replacement    - Diet: low fat  - Pain control as needed  - d/c ARLEEN prior to discharge  - cont chavez, will dc to rehab with chavez  - continue BPH medication - alfuzosin 10mg   - D/c planning to [facility] with antibiotics.  Awaiting insurance authorization.

## 2018-12-06 VITALS
DIASTOLIC BLOOD PRESSURE: 72 MMHG | HEART RATE: 76 BPM | OXYGEN SATURATION: 95 % | RESPIRATION RATE: 18 BRPM | TEMPERATURE: 98 F | SYSTOLIC BLOOD PRESSURE: 119 MMHG

## 2018-12-06 PROCEDURE — 82330 ASSAY OF CALCIUM: CPT

## 2018-12-06 PROCEDURE — 82962 GLUCOSE BLOOD TEST: CPT

## 2018-12-06 PROCEDURE — 85730 THROMBOPLASTIN TIME PARTIAL: CPT

## 2018-12-06 PROCEDURE — 96375 TX/PRO/DX INJ NEW DRUG ADDON: CPT

## 2018-12-06 PROCEDURE — 82435 ASSAY OF BLOOD CHLORIDE: CPT

## 2018-12-06 PROCEDURE — 82947 ASSAY GLUCOSE BLOOD QUANT: CPT

## 2018-12-06 PROCEDURE — 85014 HEMATOCRIT: CPT

## 2018-12-06 PROCEDURE — 97110 THERAPEUTIC EXERCISES: CPT

## 2018-12-06 PROCEDURE — 83036 HEMOGLOBIN GLYCOSYLATED A1C: CPT

## 2018-12-06 PROCEDURE — 84295 ASSAY OF SERUM SODIUM: CPT

## 2018-12-06 PROCEDURE — 97116 GAIT TRAINING THERAPY: CPT

## 2018-12-06 PROCEDURE — 76705 ECHO EXAM OF ABDOMEN: CPT

## 2018-12-06 PROCEDURE — 88304 TISSUE EXAM BY PATHOLOGIST: CPT

## 2018-12-06 PROCEDURE — 80076 HEPATIC FUNCTION PANEL: CPT

## 2018-12-06 PROCEDURE — 82248 BILIRUBIN DIRECT: CPT

## 2018-12-06 PROCEDURE — 71045 X-RAY EXAM CHEST 1 VIEW: CPT

## 2018-12-06 PROCEDURE — 83690 ASSAY OF LIPASE: CPT

## 2018-12-06 PROCEDURE — C1889: CPT

## 2018-12-06 PROCEDURE — 97162 PT EVAL MOD COMPLEX 30 MIN: CPT

## 2018-12-06 PROCEDURE — 96374 THER/PROPH/DIAG INJ IV PUSH: CPT | Mod: XU

## 2018-12-06 PROCEDURE — 83735 ASSAY OF MAGNESIUM: CPT

## 2018-12-06 PROCEDURE — 85610 PROTHROMBIN TIME: CPT

## 2018-12-06 PROCEDURE — 84132 ASSAY OF SERUM POTASSIUM: CPT

## 2018-12-06 PROCEDURE — 80053 COMPREHEN METABOLIC PANEL: CPT

## 2018-12-06 PROCEDURE — 99285 EMERGENCY DEPT VISIT HI MDM: CPT | Mod: 25

## 2018-12-06 PROCEDURE — 85027 COMPLETE CBC AUTOMATED: CPT

## 2018-12-06 PROCEDURE — 84100 ASSAY OF PHOSPHORUS: CPT

## 2018-12-06 PROCEDURE — 80048 BASIC METABOLIC PNL TOTAL CA: CPT

## 2018-12-06 PROCEDURE — 83605 ASSAY OF LACTIC ACID: CPT

## 2018-12-06 PROCEDURE — 82803 BLOOD GASES ANY COMBINATION: CPT

## 2018-12-06 PROCEDURE — 74177 CT ABD & PELVIS W/CONTRAST: CPT

## 2018-12-06 RX ADMIN — Medication 650 MILLIGRAM(S): at 17:14

## 2018-12-06 RX ADMIN — HEPARIN SODIUM 5000 UNIT(S): 5000 INJECTION INTRAVENOUS; SUBCUTANEOUS at 14:32

## 2018-12-06 RX ADMIN — Medication 650 MILLIGRAM(S): at 05:00

## 2018-12-06 RX ADMIN — HEPARIN SODIUM 5000 UNIT(S): 5000 INJECTION INTRAVENOUS; SUBCUTANEOUS at 05:00

## 2018-12-06 RX ADMIN — Medication 650 MILLIGRAM(S): at 11:38

## 2018-12-06 RX ADMIN — LOSARTAN POTASSIUM 50 MILLIGRAM(S): 100 TABLET, FILM COATED ORAL at 05:00

## 2018-12-06 RX ADMIN — Medication 650 MILLIGRAM(S): at 05:30

## 2018-12-06 RX ADMIN — PANTOPRAZOLE SODIUM 40 MILLIGRAM(S): 20 TABLET, DELAYED RELEASE ORAL at 11:37

## 2018-12-06 NOTE — PROGRESS NOTE ADULT - PROVIDER SPECIALTY LIST ADULT
Cardiology
Gastroenterology
H&P completed on 12/22/17 has been reviewed, the patient examined and I concur with the findings and plan.    
Internal Medicine
Surgery

## 2018-12-06 NOTE — PROGRESS NOTE ADULT - ASSESSMENT
89 yo male ho dm, htn, a/w acute [place]    pt s/p lap [place]  surg followup   pain control  incentive spirometry  ambulation  advance diet as tolerated  abx as per surg  drain mngt as per surg. to be removed prior to dc     hypoxia  cxr noted  no acute findings  incentive spirometry  supp o2  now resolved     DM  on insulin  monitor fs    failed TOV  cont chavez  alfuzosin as ordered     dvt ppx  dc planning  pending ins auth

## 2018-12-06 NOTE — PROGRESS NOTE ADULT - ASSESSMENT
89M POD 7 s/p laparoscopic cholecystectomy for acute cholecystitis with urinary retention requiring chavez replacement    - Diet: low fat  - Pain control as needed  - d/c ARLEEN prior to discharge  - cont chavez, will dc to rehab with chavez  - continue BPH medication - alfuzosin 10mg   - D/c planning to [facility] with antibiotics.  Awaiting insurance authorization.

## 2018-12-06 NOTE — PROGRESS NOTE ADULT - PROBLEM SELECTOR PLAN 1
diet as tolerated  pain control as needed  dc planning per primary
diet as tolerated  pain control as needed  dc planning per primary
s/p lap [place]  diet as tolerated  pain control as needed  chavez in placed as pt failed TOV  dc planning
s/p lap [place]  diet as tolerated  pain control as needed  chavez in placed as pt failed TOV  dc planning
s/p lap [place]  diet as tolerated  pain control as needed  chavez in placed as pt is retaining urine
diet as tolerated  pain control as needed  dc planning per primary
s/p Lap [place]   On IV abx   pain control  Advance diet as tolerated
s/p Lap [place]   pain control  Advance diet as tolerated
s/p Naga kruger   Completed IV abx   pain control  Advance diet as tolerated
s/p Naga kruger   Completed IV abx   pain control  Advance diet as tolerated  PT/OT

## 2018-12-06 NOTE — PROGRESS NOTE ADULT - REASON FOR ADMISSION
acute cholecystitis

## 2018-12-06 NOTE — PROGRESS NOTE ADULT - SUBJECTIVE AND OBJECTIVE BOX
Red Team Surgery Progress Note    SUBJECTIVE: Patient seen and examined at the bedside. No acute events overnight. Feeling well this morning. Tolerating regular diet without nausea or vomiting. Pain is well controlled. Has passed flatus and a bowel movement. Ambulating well. Awaiting insurance authorization for rehab    VITALS  T(C): 36.5 (12-06-18 @ 09:18), Max: 37.1 (12-06-18 @ 06:23)  HR: 78 (12-06-18 @ 09:18) (73 - 85)  BP: 131/65 (12-06-18 @ 09:18) (104/62 - 131/65)  RR: 18 (12-06-18 @ 09:18) (18 - 18)  SpO2: 95% (12-06-18 @ 09:18) (92% - 95%)    Is/Os    12-05 @ 07:01  -  12-06 @ 07:00  --------------------------------------------------------  IN:    Oral Fluid: 640 mL  Total IN: 640 mL    OUT:    Bulb: 10 mL    Indwelling Catheter - Urethral: 1900 mL  Total OUT: 1910 mL    Total NET: -1270 mL      12-06 @ 07:01  -  12-06 @ 11:38  --------------------------------------------------------  IN:    Oral Fluid: 260 mL  Total IN: 260 mL    OUT:    Indwelling Catheter - Urethral: 400 mL  Total OUT: 400 mL    Total NET: -140 mL        PHYSICAL EXAM:   General: NAD, Lying in bed comfortably, alert, oriented x3  Pulm: Non-labored breathing on RA  GI/Abd: Soft, NT/ND, no rebound/guarding, ARLEEN drain removed yesterday    MEDICATIONS (STANDING): acetaminophen   Tablet .. 650 milliGRAM(s) Oral every 6 hours  alfuzosin 10 milliGRAM(s) Oral at bedtime  heparin  Injectable 5000 Unit(s) SubCutaneous every 8 hours  losartan 50 milliGRAM(s) Oral daily  pantoprazole    Tablet 40 milliGRAM(s) Oral daily    MEDICATIONS (PRN):  LABS

## 2018-12-06 NOTE — PROGRESS NOTE ADULT - SUBJECTIVE AND OBJECTIVE BOX
Interval Events: Pt seen and examined. He is tolerating regular diet   no n/v  + flatus and bm    MEDICATIONS  (STANDING):  acetaminophen   Tablet .. 650 milliGRAM(s) Oral every 6 hours  alfuzosin 10 milliGRAM(s) Oral at bedtime  heparin  Injectable 5000 Unit(s) SubCutaneous every 8 hours  losartan 50 milliGRAM(s) Oral daily  pantoprazole    Tablet 40 milliGRAM(s) Oral daily    MEDICATIONS  (PRN):      Allergies    No Known Allergies    Intolerances        Review of Systems:    General:  No wt loss, fevers, chills, night sweats,fatigue,   Eyes:  Good vision, no reported pain  ENT:  No sore throat, pain, runny nose, dysphagia  CV:  No pain, palpitations, hypo/hypertension  Resp:  No dyspnea, cough, tachypnea, wheezing  GI:  No pain, No nausea, No vomiting, No diarrhea, No constipation, No weight loss, No fever, No pruritis, No rectal bleeding, No melena, No dysphagia  :  No pain, bleeding, incontinence, nocturia  Muscle:  No pain, weakness  Neuro:  No weakness, tingling, memory problems  Psych:  No fatigue, insomnia, mood problems, depression  Endocrine:  No polyuria, polydypsia, cold/heat intolerance  Heme:  No petechiae, ecchymosis, easy bruisability  Skin:  No rash, tattoos, scars, edema      Vital Signs Last 24 Hrs  T(C): 36.5 (06 Dec 2018 09:18), Max: 37.1 (06 Dec 2018 06:23)  T(F): 97.7 (06 Dec 2018 09:18), Max: 98.8 (06 Dec 2018 06:23)  HR: 78 (06 Dec 2018 09:18) (73 - 85)  BP: 131/65 (06 Dec 2018 09:18) (104/62 - 131/65)  BP(mean): --  RR: 18 (06 Dec 2018 09:18) (18 - 18)  SpO2: 95% (06 Dec 2018 09:18) (92% - 95%)    PHYSICAL EXAM:    Constitutional: NAD, well-developed  HEENT: EOMI, throat clear  Neck: No LAD, supple  Respiratory: CTA and P  Cardiovascular: S1 and S2, RRR, no M  Gastrointestinal: BS+, soft, NT/ND, neg HSM,  Extremities: No peripheral edema, neg clubing, cyanosis  Vascular: 2+ peripheral pulses  Neurological: A/O x 3, no focal deficits  Psychiatric: Normal mood, normal affect  Skin: No rashes      LABS:                RADIOLOGY & ADDITIONAL TESTS:

## 2018-12-06 NOTE — PROGRESS NOTE ADULT - SUBJECTIVE AND OBJECTIVE BOX
CHRISTOPH SCHROEDER  89y Male  MRN:[id number]    Patient is a 89y old  Male who presents with a chief complaint of acute cholecystitis (30 Nov 2018 08:31)    CC: abdominal pain, cholecystitis   HPI: 89M h/o gallstone pancreatitis 2009 who presents with 24 hours of abdominal pain, PO intolerance and subsequent ultrasound and CT imaging showing acute cholecystitis w/stones lodged in the neck. Non-ill appearing, RUQ tenderness on exam. Afebrile, non-leukocytotic. No transaminitis, hyperbilirubinemia or elevated alkaline phosphatase. Last meal was >12 hours prior to admission. H/o gastrectomy for gastric ulcer ~60-70 years prior; well-healed large midline incision.     Patient does not have a cardiologist. Can do two flights of stairs without becoming short of breath. Denies current chest pain or shortness of breath.        Medical and Surgical History  Acute Pancreatitis  Diabetes Mellitus Type II  Age Related Macular Degeneration  Essential (Primary) Hypertension  S/P Gastrectomy  Injury of Superficial Femoral Artery: repaired  S/P Tonsillectomy  S/P Exploratory Laparotomy: reportedly for gastric ulcer, unknown procedure  Essential (Primary) Hypertension (29 Nov 2018 10:28)      Patient seen and evaluated at bedside. No acute events overnight except as noted.    Interval HPI: no acute events o/n     PAST MEDICAL & SURGICAL HISTORY:  Acute Pancreatitis  Diabetes Mellitus Type II  Age Related Macular Degeneration  Essential (Primary) Hypertension  S/P Gastrectomy  Injury of Superficial Femoral Artery: repaired  S/P Tonsillectomy  S/P Exploratory Laparotomy: reportedly for gastric ulcer, unknown procedure  Essential (Primary) Hypertension    SOC:  non smoker  no drug or alcohol abuse    fam hx:  non cont    REVIEW OF SYSTEMS:  as per hpi    VITALS:  Vital Signs Last 24 Hrs  T(C): 36.6 (06 Dec 2018 13:23), Max: 37.1 (06 Dec 2018 06:23)  T(F): 97.9 (06 Dec 2018 13:23), Max: 98.8 (06 Dec 2018 06:23)  HR: 77 (06 Dec 2018 13:23) (73 - 85)  BP: 124/66 (06 Dec 2018 13:23) (104/62 - 131/65)  BP(mean): --  RR: 18 (06 Dec 2018 13:23) (18 - 18)  SpO2: 95% (06 Dec 2018 13:23) (92% - 95%)    PHYSICAL EXAM:  GENERAL: NAD, well-developed  HEAD:  Atraumatic, Normocephalic  EYES: EOMI, PERRLA, conjunctiva and sclera clear  NECK: Supple, No JVD  CHEST/LUNG: dec bs b/l  HEART: S1, S2; No murmurs, rubs, or gallops  ABDOMEN: Soft, mild diffuse ttp, Nondistended; Bowel sounds present.    EXTREMITIES:  2+ Peripheral Pulses, No clubbing, cyanosis, or edema  PSYCH: Normal affect  NEUROLOGY: AAOX3; non-focal  SKIN: No rashes or lesions    Consultant(s) Notes Reviewed:  [x ] YES  [ ] NO  Care Discussed with Consultants/Other Providers [ x] YES  [ ] NO    MEDS:  MEDICATIONS  (STANDING):  acetaminophen   Tablet .. 650 milliGRAM(s) Oral every 6 hours  alfuzosin 10 milliGRAM(s) Oral at bedtime  heparin  Injectable 5000 Unit(s) SubCutaneous every 8 hours  losartan 50 milliGRAM(s) Oral daily  pantoprazole    Tablet 40 milliGRAM(s) Oral daily    MEDICATIONS  (PRN):          ALLERGIES:  No Known Allergies      LABS:             no new labs       < from: Xray Chest 1 View- PORTABLE-Urgent (12.01.18 @ 19:11) >  IMPRESSION: Low lung volumes. Cardiomegaly. Right basilar atelectasis.    < end of copied text >       < from: CT Abdomen and Pelvis w/ IV Cont (11.29.18 @ 08:51) >  IMPRESSION:     Dilated gallbladder containing stones and sludge with adjacent fat   infiltration suspicious for acute cholecystitis.    < end of copied text >

## 2018-12-06 NOTE — PROGRESS NOTE ADULT - PROBLEM SELECTOR PROBLEM 1
Cholecystitis

## 2019-01-17 ENCOUNTER — APPOINTMENT (OUTPATIENT)
Dept: UROLOGY | Facility: CLINIC | Age: 84
End: 2019-01-17
Payer: MEDICARE

## 2019-01-17 VITALS
WEIGHT: 178 LBS | TEMPERATURE: 97.5 F | HEIGHT: 63 IN | SYSTOLIC BLOOD PRESSURE: 185 MMHG | BODY MASS INDEX: 31.54 KG/M2 | HEART RATE: 76 BPM | RESPIRATION RATE: 16 BRPM | DIASTOLIC BLOOD PRESSURE: 89 MMHG

## 2019-01-17 DIAGNOSIS — N13.8 BENIGN PROSTATIC HYPERPLASIA WITH LOWER URINARY TRACT SYMPMS: ICD-10-CM

## 2019-01-17 DIAGNOSIS — N40.1 BENIGN PROSTATIC HYPERPLASIA WITH LOWER URINARY TRACT SYMPMS: ICD-10-CM

## 2019-01-17 PROCEDURE — 99213 OFFICE O/P EST LOW 20 MIN: CPT

## 2019-01-21 PROBLEM — N40.1 BPH WITH OBSTRUCTION/LOWER URINARY TRACT SYMPTOMS: Status: ACTIVE | Noted: 2018-04-12

## 2019-01-21 NOTE — HISTORY OF PRESENT ILLNESS
[FreeTextEntry1] : Kendrick Santana returns to the office today. He is a 89-year-old man who I had initially met in April of 2018 with regard to a PSA elevation. PSA at that time was reported to be 21.6 ng/mL. When I repeated his PSA level at that time it was 23 ng/mL. We discussed the fact the PSA is a nonspecific test and that elevation can be caused by BPH, prostate inflammation, or prostate cancer. Given his age, we elected to defer prostate biopsy given his life expectancy would likely under 10 years and the chances that this may turn into clinically relevant prostate cancer for him in a prostate cancer were to have existed at the time was relatively low. I treated him for voiding complaints with alfuzosin for treatment of nocturia and weakness of the urinary flow.\par \par Since his visit with me, he had undergone surgery in November 2018 for issues with the gallbladder and underwent laparoscopic cholecystectomy. He developed urinary retention after that procedure and has remained with a Marinelli catheter in place since that time. He has failed multiple voiding trials.

## 2019-01-21 NOTE — ASSESSMENT
[FreeTextEntry1] : The patient was present with his daughter today in the office. Given the fact that he has already failed multiple voiding trials and is now almost 2 months out from his cholecystectomy suggests that he may harbor significant bladder outlet obstruction that does warrant treatment with endoscopic intervention. I would suggest he consider a transurethral resection of the prostate using a bipolar technique. We reviewed this procedure including risks and benefits involved and although we could consider other options including suprapubic tube placement as long-term management, he would much prefer to live without a catheter and I think this would be appropriate for him to consider the surgery.\par \par The patient is strongly in favor of surgical approach here and I will make arrangements for transurethral resection. I advised him to see his primary care physician for medical clearance prior.

## 2019-02-08 RX ORDER — SODIUM CHLORIDE 9 MG/ML
3 INJECTION INTRAMUSCULAR; INTRAVENOUS; SUBCUTANEOUS EVERY 8 HOURS
Qty: 0 | Refills: 0 | Status: DISCONTINUED | OUTPATIENT
Start: 2019-02-11 | End: 2019-02-26

## 2019-02-10 ENCOUNTER — TRANSCRIPTION ENCOUNTER (OUTPATIENT)
Age: 84
End: 2019-02-10

## 2019-02-10 VITALS
OXYGEN SATURATION: 95 % | DIASTOLIC BLOOD PRESSURE: 72 MMHG | SYSTOLIC BLOOD PRESSURE: 119 MMHG | WEIGHT: 169.98 LBS | TEMPERATURE: 98 F | HEIGHT: 64 IN | HEART RATE: 76 BPM

## 2019-02-10 NOTE — PRE-ANESTHESIA EVALUATION ADULT - NSANTHPMHFT_GEN_ALL_CORE
acute cholecystitis; ERCP; lap cholecystectomy 11/29/18 with post op hypoxia resolved; ex-smoker; gasttrectomy for ulcer 60-70 years ago; now BPH and elevated PSA with urinary retention; GERD W/O ESOPHAGITIS acute cholecystitis; ERCP; lap cholecystectomy 11/29/18 with post op hypoxia resolved; ex-smoker; gastrectomy for ulcer 60-70 years ago; now BPH and elevated PSA with urinary retention; GERD W/O ESOPHAGITIS; B/L TKR

## 2019-02-11 ENCOUNTER — APPOINTMENT (OUTPATIENT)
Dept: UROLOGY | Facility: HOSPITAL | Age: 84
End: 2019-02-11

## 2019-02-11 ENCOUNTER — OUTPATIENT (OUTPATIENT)
Dept: OUTPATIENT SERVICES | Facility: HOSPITAL | Age: 84
LOS: 1 days | End: 2019-02-11
Payer: MEDICARE

## 2019-02-11 ENCOUNTER — RESULT REVIEW (OUTPATIENT)
Age: 84
End: 2019-02-11

## 2019-02-11 DIAGNOSIS — N40.1 BENIGN PROSTATIC HYPERPLASIA WITH LOWER URINARY TRACT SYMPTOMS: ICD-10-CM

## 2019-02-11 LAB
BLD GP AB SCN SERPL QL: NEGATIVE — SIGNIFICANT CHANGE UP
GLUCOSE BLDC GLUCOMTR-MCNC: 101 MG/DL — HIGH (ref 70–99)
GLUCOSE BLDC GLUCOMTR-MCNC: 147 MG/DL — HIGH (ref 70–99)
RH IG SCN BLD-IMP: POSITIVE — SIGNIFICANT CHANGE UP

## 2019-02-11 PROCEDURE — 88341 IMHCHEM/IMCYTCHM EA ADD ANTB: CPT | Mod: 26

## 2019-02-11 PROCEDURE — 52601 PROSTATECTOMY (TURP): CPT

## 2019-02-11 PROCEDURE — 88342 IMHCHEM/IMCYTCHM 1ST ANTB: CPT | Mod: 26

## 2019-02-11 PROCEDURE — 88305 TISSUE EXAM BY PATHOLOGIST: CPT | Mod: 26

## 2019-02-11 RX ORDER — LOSARTAN POTASSIUM 100 MG/1
50 TABLET, FILM COATED ORAL DAILY
Refills: 0 | Status: DISCONTINUED | OUTPATIENT
Start: 2019-02-11 | End: 2019-02-26

## 2019-02-11 RX ORDER — ACETAMINOPHEN 500 MG
650 TABLET ORAL EVERY 6 HOURS
Refills: 0 | Status: DISCONTINUED | OUTPATIENT
Start: 2019-02-11 | End: 2019-02-26

## 2019-02-11 RX ORDER — IBUPROFEN 200 MG
600 TABLET ORAL EVERY 8 HOURS
Refills: 0 | Status: DISCONTINUED | OUTPATIENT
Start: 2019-02-11 | End: 2019-02-26

## 2019-02-11 RX ORDER — ONDANSETRON 8 MG/1
4 TABLET, FILM COATED ORAL DAILY
Refills: 0 | Status: DISCONTINUED | OUTPATIENT
Start: 2019-02-11 | End: 2019-02-12

## 2019-02-11 RX ORDER — CIPROFLOXACIN LACTATE 400MG/40ML
400 VIAL (ML) INTRAVENOUS ONCE
Refills: 0 | Status: COMPLETED | OUTPATIENT
Start: 2019-02-12 | End: 2019-02-12

## 2019-02-11 RX ORDER — INSULIN LISPRO 100/ML
VIAL (ML) SUBCUTANEOUS
Qty: 0 | Refills: 0 | Status: DISCONTINUED | OUTPATIENT
Start: 2019-02-11 | End: 2019-02-26

## 2019-02-11 RX ORDER — MORPHINE SULFATE 50 MG/1
1 CAPSULE, EXTENDED RELEASE ORAL
Refills: 0 | Status: DISCONTINUED | OUTPATIENT
Start: 2019-02-11 | End: 2019-02-12

## 2019-02-11 RX ORDER — HEPARIN SODIUM 5000 [USP'U]/ML
5000 INJECTION INTRAVENOUS; SUBCUTANEOUS EVERY 8 HOURS
Refills: 0 | Status: DISCONTINUED | OUTPATIENT
Start: 2019-02-11 | End: 2019-02-26

## 2019-02-11 RX ORDER — SENNA PLUS 8.6 MG/1
2 TABLET ORAL AT BEDTIME
Refills: 0 | Status: DISCONTINUED | OUTPATIENT
Start: 2019-02-11 | End: 2019-02-26

## 2019-02-11 RX ORDER — ONDANSETRON 8 MG/1
4 TABLET, FILM COATED ORAL ONCE
Qty: 0 | Refills: 0 | Status: DISCONTINUED | OUTPATIENT
Start: 2019-02-11 | End: 2019-02-12

## 2019-02-11 RX ORDER — INSULIN LISPRO 100/ML
VIAL (ML) SUBCUTANEOUS AT BEDTIME
Qty: 0 | Refills: 0 | Status: DISCONTINUED | OUTPATIENT
Start: 2019-02-11 | End: 2019-02-26

## 2019-02-11 RX ORDER — CEFAZOLIN SODIUM 1 G
2000 VIAL (EA) INJECTION ONCE
Qty: 0 | Refills: 0 | Status: DISCONTINUED | OUTPATIENT
Start: 2019-02-11 | End: 2019-02-11

## 2019-02-11 RX ORDER — PANTOPRAZOLE SODIUM 20 MG/1
40 TABLET, DELAYED RELEASE ORAL
Refills: 0 | Status: DISCONTINUED | OUTPATIENT
Start: 2019-02-11 | End: 2019-02-26

## 2019-02-11 RX ORDER — DEXTROSE 50 % IN WATER 50 %
15 SYRINGE (ML) INTRAVENOUS ONCE
Qty: 0 | Refills: 0 | Status: DISCONTINUED | OUTPATIENT
Start: 2019-02-11 | End: 2019-02-26

## 2019-02-11 RX ORDER — DEXTROSE 50 % IN WATER 50 %
25 SYRINGE (ML) INTRAVENOUS ONCE
Qty: 0 | Refills: 0 | Status: DISCONTINUED | OUTPATIENT
Start: 2019-02-11 | End: 2019-02-26

## 2019-02-11 RX ORDER — GLUCAGON INJECTION, SOLUTION 0.5 MG/.1ML
1 INJECTION, SOLUTION SUBCUTANEOUS ONCE
Qty: 0 | Refills: 0 | Status: DISCONTINUED | OUTPATIENT
Start: 2019-02-11 | End: 2019-02-26

## 2019-02-11 RX ORDER — TAMSULOSIN HYDROCHLORIDE 0.4 MG/1
0.4 CAPSULE ORAL AT BEDTIME
Refills: 0 | Status: DISCONTINUED | OUTPATIENT
Start: 2019-02-11 | End: 2019-02-26

## 2019-02-11 RX ORDER — SODIUM CHLORIDE 9 MG/ML
1000 INJECTION, SOLUTION INTRAVENOUS
Qty: 0 | Refills: 0 | Status: DISCONTINUED | OUTPATIENT
Start: 2019-02-11 | End: 2019-02-26

## 2019-02-11 RX ORDER — SODIUM CHLORIDE 9 MG/ML
1000 INJECTION, SOLUTION INTRAVENOUS
Refills: 0 | Status: DISCONTINUED | OUTPATIENT
Start: 2019-02-11 | End: 2019-02-12

## 2019-02-11 RX ORDER — DOCUSATE SODIUM 100 MG
100 CAPSULE ORAL THREE TIMES A DAY
Refills: 0 | Status: DISCONTINUED | OUTPATIENT
Start: 2019-02-11 | End: 2019-02-26

## 2019-02-11 RX ORDER — DEXTROSE 50 % IN WATER 50 %
12.5 SYRINGE (ML) INTRAVENOUS ONCE
Qty: 0 | Refills: 0 | Status: DISCONTINUED | OUTPATIENT
Start: 2019-02-11 | End: 2019-02-26

## 2019-02-11 RX ORDER — HYDROMORPHONE HYDROCHLORIDE 2 MG/ML
0.2 INJECTION INTRAMUSCULAR; INTRAVENOUS; SUBCUTANEOUS
Qty: 0 | Refills: 0 | Status: DISCONTINUED | OUTPATIENT
Start: 2019-02-11 | End: 2019-02-12

## 2019-02-11 RX ADMIN — TAMSULOSIN HYDROCHLORIDE 0.4 MILLIGRAM(S): 0.4 CAPSULE ORAL at 21:16

## 2019-02-11 RX ADMIN — SODIUM CHLORIDE 3 MILLILITER(S): 9 INJECTION INTRAMUSCULAR; INTRAVENOUS; SUBCUTANEOUS at 21:17

## 2019-02-11 RX ADMIN — HYDROMORPHONE HYDROCHLORIDE 0.2 MILLIGRAM(S): 2 INJECTION INTRAMUSCULAR; INTRAVENOUS; SUBCUTANEOUS at 18:15

## 2019-02-11 RX ADMIN — HEPARIN SODIUM 5000 UNIT(S): 5000 INJECTION INTRAVENOUS; SUBCUTANEOUS at 21:16

## 2019-02-11 RX ADMIN — HYDROMORPHONE HYDROCHLORIDE 0.2 MILLIGRAM(S): 2 INJECTION INTRAMUSCULAR; INTRAVENOUS; SUBCUTANEOUS at 19:26

## 2019-02-11 NOTE — H&P PST ADULT - ASSESSMENT
Assessment from 1/17/19 visit:  The patient was present with his daughter today in the office. Given the fact that he has already failed multiple voiding trials and is now almost 2 months out from his cholecystectomy suggests that he may harbor significant bladder outlet obstruction that does warrant treatment with endoscopic intervention. I would suggest he consider a transurethral resection of the prostate using a bipolar technique. We reviewed this procedure including risks and benefits involved and although we could consider other options including suprapubic tube placement as long-term management, he would much prefer to live without a catheter and I think this would be appropriate for him to consider the surgery.    The patient is strongly in favor of surgical approach here and I will make arrangements for transurethral resection. I advised him to see his primary care physician for medical clearance prior.     Pt is in same day for planned TURP today  - NPO  - IV insert

## 2019-02-11 NOTE — H&P PST ADULT - HISTORY OF PRESENT ILLNESS
Kendrick Santana returns to the office today. He is a 89-year-old man who I had initially met in April of 2018 with regard to a PSA elevation. PSA at that time was reported to be 21.6 ng/mL. When I repeated his PSA level at that time it was 23 ng/mL. We discussed the fact the PSA is a nonspecific test and that elevation can be caused by BPH, prostate inflammation, or prostate cancer. Given his age, we elected to defer prostate biopsy given his life expectancy would likely under 10 years and the chances that this may turn into clinically relevant prostate cancer for him in a prostate cancer were to have existed at the time was relatively low. I treated him for voiding complaints with alfuzosin for treatment of nocturia and weakness of the urinary flow.    Since his visit with me, he had undergone surgery in November 2018 for issues with the gallbladder and underwent laparoscopic cholecystectomy. He developed urinary retention after that procedure and has remained with a Marinelli catheter in place since that time. He has failed multiple voiding trials.     Above HPI authored by Dr. Wolf Field from in office visit 1/17/19

## 2019-02-12 VITALS
RESPIRATION RATE: 18 BRPM | SYSTOLIC BLOOD PRESSURE: 126 MMHG | HEART RATE: 68 BPM | OXYGEN SATURATION: 96 % | DIASTOLIC BLOOD PRESSURE: 78 MMHG | TEMPERATURE: 98 F

## 2019-02-12 LAB
ANION GAP SERPL CALC-SCNC: 13 MMOL/L — SIGNIFICANT CHANGE UP (ref 5–17)
BUN SERPL-MCNC: 27 MG/DL — HIGH (ref 7–23)
CALCIUM SERPL-MCNC: 9.1 MG/DL — SIGNIFICANT CHANGE UP (ref 8.4–10.5)
CHLORIDE SERPL-SCNC: 101 MMOL/L — SIGNIFICANT CHANGE UP (ref 96–108)
CO2 SERPL-SCNC: 22 MMOL/L — SIGNIFICANT CHANGE UP (ref 22–31)
CREAT SERPL-MCNC: 0.91 MG/DL — SIGNIFICANT CHANGE UP (ref 0.5–1.3)
GLUCOSE BLDC GLUCOMTR-MCNC: 148 MG/DL — HIGH (ref 70–99)
GLUCOSE BLDC GLUCOMTR-MCNC: 150 MG/DL — HIGH (ref 70–99)
GLUCOSE SERPL-MCNC: 165 MG/DL — HIGH (ref 70–99)
HBA1C BLD-MCNC: 6 % — HIGH (ref 4–5.6)
HCT VFR BLD CALC: 35 % — LOW (ref 39–50)
HGB BLD-MCNC: 11.9 G/DL — LOW (ref 13–17)
MCHC RBC-ENTMCNC: 27.5 PG — SIGNIFICANT CHANGE UP (ref 27–34)
MCHC RBC-ENTMCNC: 33.9 GM/DL — SIGNIFICANT CHANGE UP (ref 32–36)
MCV RBC AUTO: 81.1 FL — SIGNIFICANT CHANGE UP (ref 80–100)
PLATELET # BLD AUTO: 162 K/UL — SIGNIFICANT CHANGE UP (ref 150–400)
POTASSIUM SERPL-MCNC: 4.7 MMOL/L — SIGNIFICANT CHANGE UP (ref 3.5–5.3)
POTASSIUM SERPL-SCNC: 4.7 MMOL/L — SIGNIFICANT CHANGE UP (ref 3.5–5.3)
RBC # BLD: 4.31 M/UL — SIGNIFICANT CHANGE UP (ref 4.2–5.8)
RBC # FLD: 12.4 % — SIGNIFICANT CHANGE UP (ref 10.3–14.5)
SODIUM SERPL-SCNC: 136 MMOL/L — SIGNIFICANT CHANGE UP (ref 135–145)
WBC # BLD: 7.4 K/UL — SIGNIFICANT CHANGE UP (ref 3.8–10.5)
WBC # FLD AUTO: 7.4 K/UL — SIGNIFICANT CHANGE UP (ref 3.8–10.5)

## 2019-02-12 PROCEDURE — 52601 PROSTATECTOMY (TURP): CPT

## 2019-02-12 PROCEDURE — 86901 BLOOD TYPING SEROLOGIC RH(D): CPT

## 2019-02-12 PROCEDURE — 82435 ASSAY OF BLOOD CHLORIDE: CPT

## 2019-02-12 PROCEDURE — 83036 HEMOGLOBIN GLYCOSYLATED A1C: CPT

## 2019-02-12 PROCEDURE — 88341 IMHCHEM/IMCYTCHM EA ADD ANTB: CPT

## 2019-02-12 PROCEDURE — 86850 RBC ANTIBODY SCREEN: CPT

## 2019-02-12 PROCEDURE — 82947 ASSAY GLUCOSE BLOOD QUANT: CPT

## 2019-02-12 PROCEDURE — 82803 BLOOD GASES ANY COMBINATION: CPT

## 2019-02-12 PROCEDURE — 82565 ASSAY OF CREATININE: CPT

## 2019-02-12 PROCEDURE — 85027 COMPLETE CBC AUTOMATED: CPT

## 2019-02-12 PROCEDURE — 82962 GLUCOSE BLOOD TEST: CPT

## 2019-02-12 PROCEDURE — 88305 TISSUE EXAM BY PATHOLOGIST: CPT

## 2019-02-12 PROCEDURE — 83605 ASSAY OF LACTIC ACID: CPT

## 2019-02-12 PROCEDURE — 88342 IMHCHEM/IMCYTCHM 1ST ANTB: CPT

## 2019-02-12 PROCEDURE — 85014 HEMATOCRIT: CPT

## 2019-02-12 PROCEDURE — 84132 ASSAY OF SERUM POTASSIUM: CPT

## 2019-02-12 PROCEDURE — 82330 ASSAY OF CALCIUM: CPT

## 2019-02-12 PROCEDURE — 84295 ASSAY OF SERUM SODIUM: CPT

## 2019-02-12 PROCEDURE — 80048 BASIC METABOLIC PNL TOTAL CA: CPT

## 2019-02-12 PROCEDURE — 86900 BLOOD TYPING SEROLOGIC ABO: CPT

## 2019-02-12 RX ORDER — SENNA PLUS 8.6 MG/1
2 TABLET ORAL
Qty: 0 | Refills: 0 | DISCHARGE
Start: 2019-02-12

## 2019-02-12 RX ORDER — DOCUSATE SODIUM 100 MG
1 CAPSULE ORAL
Qty: 0 | Refills: 0 | DISCHARGE
Start: 2019-02-12

## 2019-02-12 RX ADMIN — HEPARIN SODIUM 5000 UNIT(S): 5000 INJECTION INTRAVENOUS; SUBCUTANEOUS at 06:54

## 2019-02-12 RX ADMIN — Medication 600 MILLIGRAM(S): at 05:00

## 2019-02-12 RX ADMIN — Medication 100 MILLIGRAM(S): at 13:31

## 2019-02-12 RX ADMIN — MORPHINE SULFATE 1 MILLIGRAM(S): 50 CAPSULE, EXTENDED RELEASE ORAL at 00:30

## 2019-02-12 RX ADMIN — PANTOPRAZOLE SODIUM 40 MILLIGRAM(S): 20 TABLET, DELAYED RELEASE ORAL at 06:53

## 2019-02-12 RX ADMIN — MORPHINE SULFATE 1 MILLIGRAM(S): 50 CAPSULE, EXTENDED RELEASE ORAL at 00:45

## 2019-02-12 RX ADMIN — Medication 100 MILLIGRAM(S): at 06:54

## 2019-02-12 RX ADMIN — SODIUM CHLORIDE 3 MILLILITER(S): 9 INJECTION INTRAMUSCULAR; INTRAVENOUS; SUBCUTANEOUS at 05:32

## 2019-02-12 RX ADMIN — Medication 200 MILLIGRAM(S): at 05:36

## 2019-02-12 RX ADMIN — Medication 600 MILLIGRAM(S): at 04:00

## 2019-02-12 RX ADMIN — HEPARIN SODIUM 5000 UNIT(S): 5000 INJECTION INTRAVENOUS; SUBCUTANEOUS at 13:20

## 2019-02-12 RX ADMIN — LOSARTAN POTASSIUM 50 MILLIGRAM(S): 100 TABLET, FILM COATED ORAL at 06:54

## 2019-02-12 NOTE — PROGRESS NOTE ADULT - ASSESSMENT
A/P: 89y Male POD #1s/p bipolar TURP recovering well  chavez removed TOV  - Pain control  - minimize narcotics/anticholinergics  - Strict I/O's  - OOB/DVT ppx  - AM labs  - dc planning

## 2019-02-12 NOTE — PROGRESS NOTE ADULT - SUBJECTIVE AND OBJECTIVE BOX
UROLOGY DAILY PROGRESS NOTE:     Subjective: Patient seen and examined at bedside.  phone used   no pain just scared chaevz         Objective:  Vital signs  T(F): , Max: 98.1 (02-11-19 @ 17:50)  HR: 63 (02-12-19 @ 04:00)  BP: 149/70 (02-11-19 @ 20:00)  SpO2: 97% (02-12-19 @ 04:00)  Wt(kg): --    Output     I&O's Detail    11 Feb 2019 07:01  -  12 Feb 2019 06:51  --------------------------------------------------------  IN:    lactated ringers.: 550 mL    Oral Fluid: 120 mL  Total IN: 670 mL    OUT:  Total OUT: 0 mL    Total NET: 670 mL          Physical Exam:  Gen: NAD  Abd:soft nt nd   Back: no CVAT  : chavez light pink no clots   removed    Labs:  02-12  7.4   / 35.0  /0.91                           11.9   7.4   )-----------( 162      ( 12 Feb 2019 03:28 )             35.0     02-12    136  |  101  |  27<H>  ----------------------------<  165<H>  4.7   |  22  |  0.91    Ca    9.1      12 Feb 2019 03:28    LABS/RADIOLOGY RESULTS:                          11.9   7.4   )-----------( 162      ( 12 Feb 2019 03:28 )             35.0   02-12    136  |  101  |  27<H>  ----------------------------<  165<H>  4.7   |  22  |  0.91    Ca    9.1      12 Feb 2019 03:28    Blood Cultures                Urine Cx: UROLOGY DAILY PROGRESS NOTE:     Subjective: Patient seen and examined at bedside.  phone used   no pain just scared of chavez         Objective:  Vital signs  T(F): , Max: 98.1 (02-11-19 @ 17:50)  HR: 63 (02-12-19 @ 04:00)  BP: 149/70 (02-11-19 @ 20:00)  SpO2: 97% (02-12-19 @ 04:00)  Wt(kg): --    Output     I&O's Detail    11 Feb 2019 07:01  -  12 Feb 2019 06:51  --------------------------------------------------------  IN:    lactated ringers.: 550 mL    Oral Fluid: 120 mL  Total IN: 670 mL    OUT:  Total OUT: 0 mL    Total NET: 670 mL          Physical Exam:  Gen: NAD  Abd:soft nt nd   Back: no CVAT  : chavez light pink no clots   removed    Labs:  02-12  7.4   / 35.0  /0.91                           11.9   7.4   )-----------( 162      ( 12 Feb 2019 03:28 )             35.0     02-12    136  |  101  |  27<H>  ----------------------------<  165<H>  4.7   |  22  |  0.91    Ca    9.1      12 Feb 2019 03:28    LABS/RADIOLOGY RESULTS:                          11.9   7.4   )-----------( 162      ( 12 Feb 2019 03:28 )             35.0   02-12    136  |  101  |  27<H>  ----------------------------<  165<H>  4.7   |  22  |  0.91    Ca    9.1      12 Feb 2019 03:28    Blood Cultures                Urine Cx:

## 2019-02-12 NOTE — ASU DISCHARGE PLAN (ADULT/PEDIATRIC). - NOTIFY
Fever greater than 101/Pain not relieved by Medications/Bleeding that does not stop/Unable to Urinate

## 2019-02-12 NOTE — PROGRESS NOTE ADULT - ASSESSMENT
A/P: 89y Male s/p bipolar TURP recovering well    - Pain control  - minimize narcotics/anticholinergics  - Strict I/O's  - F/U chavez color  - OOB/DVT ppx  - AM labs

## 2019-02-12 NOTE — PROGRESS NOTE ADULT - SUBJECTIVE AND OBJECTIVE BOX
[facility] GENERAL SURGERY POST-OP NOTE    SUBJECTIVE: Pt seen and evaluated at bedside. Resting comfortably in bed. Pain controlled. Denies nausea/vomiting, CP, palpitations, SOB, lightheaded, dizziness. Voiding. Ambulating. Tolerating PO intake. chavez clear    Objective:  Gen: NAD, AAOx3  Pulm: b/l chest rise. No work on breathing  Card: normal rate, RR, no m/r/g  Abd: soft, appropriately tender, ND. Dressings CDI.    Vital Signs Last 24 Hrs  T(C): 36.2 (11 Feb 2019 20:00), Max: 36.7 (11 Feb 2019 17:50)  T(F): 97.2 (11 Feb 2019 20:00), Max: 98.1 (11 Feb 2019 17:50)  HR: 62 (11 Feb 2019 22:00) (55 - 89)  BP: 149/70 (11 Feb 2019 20:00) (111/56 - 165/73)  BP(mean): 100 (11 Feb 2019 20:00) (79 - 105)  RR: 16 (11 Feb 2019 22:00) (16 - 18)  SpO2: 100% (11 Feb 2019 22:00) (93% - 100%)  I&O's Summary    11 Feb 2019 07:01  -  12 Feb 2019 00:37  --------------------------------------------------------  IN: 100 mL / OUT: 0 mL / NET: 100 mL      I&O's Detail    11 Feb 2019 07:01  -  12 Feb 2019 00:37  --------------------------------------------------------  IN:    lactated ringers.: 100 mL  Total IN: 100 mL    OUT:  Total OUT: 0 mL    Total NET: 100 mL          MEDICATIONS  (STANDING):  ciprofloxacin   IVPB 400 milliGRAM(s) IV Intermittent once  dextrose 5%. 1000 milliLiter(s) (50 mL/Hr) IV Continuous <Continuous>  dextrose 50% Injectable 12.5 Gram(s) IV Push once  dextrose 50% Injectable 25 Gram(s) IV Push once  dextrose 50% Injectable 25 Gram(s) IV Push once  docusate sodium 100 milliGRAM(s) Oral three times a day  heparin  Injectable 5000 Unit(s) SubCutaneous every 8 hours  insulin lispro (HumaLOG) corrective regimen sliding scale   SubCutaneous three times a day before meals  insulin lispro (HumaLOG) corrective regimen sliding scale   SubCutaneous at bedtime  lactated ringers. 1000 milliLiter(s) (50 mL/Hr) IV Continuous <Continuous>  losartan 50 milliGRAM(s) Oral daily  pantoprazole    Tablet 40 milliGRAM(s) Oral before breakfast  senna 2 Tablet(s) Oral at bedtime  sodium chloride 0.9% lock flush 3 milliLiter(s) IV Push every 8 hours  tamsulosin 0.4 milliGRAM(s) Oral at bedtime    MEDICATIONS  (PRN):  acetaminophen   Tablet .. 650 milliGRAM(s) Oral every 6 hours PRN Mild Pain (1 - 3)  dextrose 40% Gel 15 Gram(s) Oral once PRN Blood Glucose LESS THAN 70 milliGRAM(s)/deciliter  glucagon  Injectable 1 milliGRAM(s) IntraMuscular once PRN Glucose LESS THAN 70 milligrams/deciliter  HYDROmorphone  Injectable 0.2 milliGRAM(s) IV Push every 10 minutes PRN Moderate Pain (4 - 6)  ibuprofen  Tablet. 600 milliGRAM(s) Oral every 8 hours PRN Moderate Pain (4 - 6)  morphine  - Injectable 1 milliGRAM(s) IV Push every 10 minutes PRN Moderate Pain (4 - 6)  ondansetron Injectable 4 milliGRAM(s) IV Push daily PRN Nausea and/or Vomiting  ondansetron Injectable 4 milliGRAM(s) IV Push once PRN Nausea and/or Vomiting      LABS:          RADIOLOGY & ADDITIONAL STUDIES:    labs

## 2019-02-12 NOTE — ASU DISCHARGE PLAN (ADULT/PEDIATRIC). - MEDICATION SUMMARY - MEDICATIONS TO TAKE
I will START or STAY ON the medications listed below when I get home from the hospital:    acetaminophen 325 mg oral tablet  -- 2 tab(s) by mouth every 6 hours  -- Indication: For pain    irbesartan 150 mg oral tablet  -- 1 tab(s) by mouth once a day  -- Indication: For Blood pressure    Cozaar  -- 50 milligram(s) by mouth once a day  -- Indication: For Blood presure    alfuzosin  -- 10 milligram(s) by mouth once a day  -- Indication: For prostate    senna oral tablet  -- 2 tab(s) by mouth once a day (at bedtime)  -- Indication: For laxative    docusate sodium 100 mg oral capsule  -- 1 cap(s) by mouth 3 times a day  -- Indication: For stool softener    omeprazole 20 mg oral delayed release tablet  -- 1 tab(s) by mouth once a day  -- Indication: For gerd

## 2019-02-19 LAB — SURGICAL PATHOLOGY STUDY: SIGNIFICANT CHANGE UP

## 2020-01-31 ENCOUNTER — INPATIENT (INPATIENT)
Facility: HOSPITAL | Age: 85
LOS: 6 days | Discharge: NURSING, REHAB OR LONG-TERM CARE | DRG: 445 | End: 2020-02-07
Attending: STUDENT IN AN ORGANIZED HEALTH CARE EDUCATION/TRAINING PROGRAM | Admitting: STUDENT IN AN ORGANIZED HEALTH CARE EDUCATION/TRAINING PROGRAM
Payer: MEDICARE

## 2020-01-31 VITALS
OXYGEN SATURATION: 93 % | RESPIRATION RATE: 18 BRPM | HEART RATE: 88 BPM | TEMPERATURE: 98 F | DIASTOLIC BLOOD PRESSURE: 90 MMHG | SYSTOLIC BLOOD PRESSURE: 148 MMHG | WEIGHT: 184.97 LBS

## 2020-01-31 DIAGNOSIS — Z90.49 ACQUIRED ABSENCE OF OTHER SPECIFIED PARTS OF DIGESTIVE TRACT: Chronic | ICD-10-CM

## 2020-01-31 LAB
ALBUMIN SERPL ELPH-MCNC: 3.7 G/DL — SIGNIFICANT CHANGE UP (ref 3.3–5)
ALP SERPL-CCNC: 210 U/L — HIGH (ref 40–120)
ALT FLD-CCNC: 346 U/L — HIGH (ref 10–45)
ANION GAP SERPL CALC-SCNC: 14 MMOL/L — SIGNIFICANT CHANGE UP (ref 5–17)
AST SERPL-CCNC: 165 U/L — HIGH (ref 10–40)
BASE EXCESS BLDV CALC-SCNC: 0.7 MMOL/L — SIGNIFICANT CHANGE UP (ref -2–2)
BASOPHILS # BLD AUTO: 0.04 K/UL — SIGNIFICANT CHANGE UP (ref 0–0.2)
BASOPHILS NFR BLD AUTO: 0.4 % — SIGNIFICANT CHANGE UP (ref 0–2)
BILIRUB SERPL-MCNC: 1.4 MG/DL — HIGH (ref 0.2–1.2)
BUN SERPL-MCNC: 21 MG/DL — SIGNIFICANT CHANGE UP (ref 7–23)
CALCIUM SERPL-MCNC: 9.6 MG/DL — SIGNIFICANT CHANGE UP (ref 8.4–10.5)
CHLORIDE SERPL-SCNC: 100 MMOL/L — SIGNIFICANT CHANGE UP (ref 96–108)
CO2 BLDV-SCNC: 26 MMOL/L — SIGNIFICANT CHANGE UP (ref 22–30)
CO2 SERPL-SCNC: 22 MMOL/L — SIGNIFICANT CHANGE UP (ref 22–31)
CREAT SERPL-MCNC: 1.18 MG/DL — SIGNIFICANT CHANGE UP (ref 0.5–1.3)
EOSINOPHIL # BLD AUTO: 0.44 K/UL — SIGNIFICANT CHANGE UP (ref 0–0.5)
EOSINOPHIL NFR BLD AUTO: 4 % — SIGNIFICANT CHANGE UP (ref 0–6)
GAS PNL BLDV: SIGNIFICANT CHANGE UP
GLUCOSE SERPL-MCNC: 117 MG/DL — HIGH (ref 70–99)
HCO3 BLDV-SCNC: 25 MMOL/L — SIGNIFICANT CHANGE UP (ref 21–29)
HCT VFR BLD CALC: 39.6 % — SIGNIFICANT CHANGE UP (ref 39–50)
HGB BLD-MCNC: 12.9 G/DL — LOW (ref 13–17)
IMM GRANULOCYTES NFR BLD AUTO: 0.5 % — SIGNIFICANT CHANGE UP (ref 0–1.5)
LACTATE BLDV-MCNC: 1.8 MMOL/L — SIGNIFICANT CHANGE UP (ref 0.7–2)
LIDOCAIN IGE QN: 12 U/L — SIGNIFICANT CHANGE UP (ref 7–60)
LYMPHOCYTES # BLD AUTO: 2.59 K/UL — SIGNIFICANT CHANGE UP (ref 1–3.3)
LYMPHOCYTES # BLD AUTO: 23.4 % — SIGNIFICANT CHANGE UP (ref 13–44)
MCHC RBC-ENTMCNC: 26.7 PG — LOW (ref 27–34)
MCHC RBC-ENTMCNC: 32.6 GM/DL — SIGNIFICANT CHANGE UP (ref 32–36)
MCV RBC AUTO: 82 FL — SIGNIFICANT CHANGE UP (ref 80–100)
MONOCYTES # BLD AUTO: 0.91 K/UL — HIGH (ref 0–0.9)
MONOCYTES NFR BLD AUTO: 8.2 % — SIGNIFICANT CHANGE UP (ref 2–14)
NEUTROPHILS # BLD AUTO: 7.02 K/UL — SIGNIFICANT CHANGE UP (ref 1.8–7.4)
NEUTROPHILS NFR BLD AUTO: 63.5 % — SIGNIFICANT CHANGE UP (ref 43–77)
NRBC # BLD: 0 /100 WBCS — SIGNIFICANT CHANGE UP (ref 0–0)
PCO2 BLDV: 40 MMHG — SIGNIFICANT CHANGE UP (ref 35–50)
PH BLDV: 7.41 — SIGNIFICANT CHANGE UP (ref 7.35–7.45)
PLATELET # BLD AUTO: 164 K/UL — SIGNIFICANT CHANGE UP (ref 150–400)
PO2 BLDV: 45 MMHG — SIGNIFICANT CHANGE UP (ref 25–45)
POTASSIUM SERPL-MCNC: 4 MMOL/L — SIGNIFICANT CHANGE UP (ref 3.5–5.3)
POTASSIUM SERPL-SCNC: 4 MMOL/L — SIGNIFICANT CHANGE UP (ref 3.5–5.3)
PROT SERPL-MCNC: 6.5 G/DL — SIGNIFICANT CHANGE UP (ref 6–8.3)
RBC # BLD: 4.83 M/UL — SIGNIFICANT CHANGE UP (ref 4.2–5.8)
RBC # FLD: 14.5 % — SIGNIFICANT CHANGE UP (ref 10.3–14.5)
SAO2 % BLDV: 81 % — SIGNIFICANT CHANGE UP (ref 67–88)
SODIUM SERPL-SCNC: 136 MMOL/L — SIGNIFICANT CHANGE UP (ref 135–145)
WBC # BLD: 11.05 K/UL — HIGH (ref 3.8–10.5)
WBC # FLD AUTO: 11.05 K/UL — HIGH (ref 3.8–10.5)

## 2020-01-31 PROCEDURE — 74177 CT ABD & PELVIS W/CONTRAST: CPT | Mod: 26

## 2020-01-31 PROCEDURE — 99285 EMERGENCY DEPT VISIT HI MDM: CPT

## 2020-01-31 PROCEDURE — 71045 X-RAY EXAM CHEST 1 VIEW: CPT | Mod: 26

## 2020-01-31 RX ORDER — SODIUM CHLORIDE 9 MG/ML
1000 INJECTION INTRAMUSCULAR; INTRAVENOUS; SUBCUTANEOUS ONCE
Refills: 0 | Status: COMPLETED | OUTPATIENT
Start: 2020-01-31 | End: 2020-01-31

## 2020-01-31 RX ORDER — PIPERACILLIN AND TAZOBACTAM 4; .5 G/20ML; G/20ML
3.38 INJECTION, POWDER, LYOPHILIZED, FOR SOLUTION INTRAVENOUS ONCE
Refills: 0 | Status: COMPLETED | OUTPATIENT
Start: 2020-01-31 | End: 2020-01-31

## 2020-01-31 RX ADMIN — PIPERACILLIN AND TAZOBACTAM 200 GRAM(S): 4; .5 INJECTION, POWDER, LYOPHILIZED, FOR SOLUTION INTRAVENOUS at 21:45

## 2020-01-31 RX ADMIN — SODIUM CHLORIDE 1000 MILLILITER(S): 9 INJECTION INTRAMUSCULAR; INTRAVENOUS; SUBCUTANEOUS at 19:33

## 2020-01-31 NOTE — ED PROVIDER NOTE - CLINICAL SUMMARY MEDICAL DECISION MAKING FREE TEXT BOX
Bettie Jane MD PGY-2 89 yo M reported fever and abnormal LFTs. RUQ, epigastric and LLQ TTP on exam. Will repeat labs, and get CT. Concern for choledocholithiasis, less likely cholangitis given not currently febrile. will reassess

## 2020-01-31 NOTE — ED ADULT NURSE NOTE - OBJECTIVE STATEMENT
89yr old male w/ pmhx of DMT2, acute cholecystitis, dementia, HTN, pancreatitis presents to ED c/o fever. per pts daughter, pts had a check up with PCP yesterday and today she was advised his LFTs were elevated and he was running a fever. Pt is also c/o abdominal pain. guarding and tenderness noted upon palpation of RUQ and LLQ. Pt denies CP, SOB, N,V,D, Blurry vision or numbness and tingling. Pt assessed by MD, bed is lowered and locked, family at bedside. will reassess

## 2020-01-31 NOTE — ED PROVIDER NOTE - PSH
Essential (Primary) Hypertension    Injury of Superficial Femoral Artery  repaired  S/P cholecystectomy    S/P Exploratory Laparotomy  reportedly for gastric ulcer, unknown procedure  S/P Gastrectomy    S/P Tonsillectomy

## 2020-01-31 NOTE — ED PROVIDER NOTE - OBJECTIVE STATEMENT
Bettie Jane MD PGY-2 89 yo M from [facility] with PMH dementia, BPH s/p TURP, GERD, gastric ulcer s/p gastrectomy, hx gallstone pancreatitis 2009 with acute cholecystitis with lap cholecystectomy 2018, HTN, DM brought in for fever and elevated LFTs. Endorsing lower abdominal pain. Daughter at bedside providing history.

## 2020-01-31 NOTE — ED PROVIDER NOTE - ATTENDING CONTRIBUTION TO CARE
[place] speaking male w abnormal LFTS, and abd pain,   pt mildly jaundiced appearing w/ RUQ tendeneress s/p [place]  will get ct cover w/ antibiotics and admit to medicine for further work up w/ MRCP

## 2020-01-31 NOTE — ED ADULT NURSE NOTE - NSIMPLEMENTINTERV_GEN_ALL_ED
Implemented All Fall with Harm Risk Interventions:  [place] to call system. Call bell, personal items and telephone within reach. Instruct patient to call for assistance. Room bathroom lighting operational. Non-slip footwear when patient is off stretcher. Physically safe environment: no spills, clutter or unnecessary equipment. Stretcher in lowest position, wheels locked, appropriate side rails in place. Provide visual cue, wrist band, yellow gown, etc. Monitor gait and stability. Monitor for mental status changes and reorient to person, place, and time. Review medications for side effects contributing to fall risk. Reinforce activity limits and safety measures with patient and family. Provide visual clues: red socks.

## 2020-01-31 NOTE — ED PROVIDER NOTE - PHYSICAL EXAMINATION
PHYSICAL EXAM:   General: in no acute distress  HEENT: NC/AT, airway patent  Cardiovascular: regular rate and rhythm, + S1/S2, no murmurs, rubs, gallops appreciated  Respiratory: diffusely diminished, nonlabored respirations  Abdominal: soft, epigastric, RUQ and LLQ ttp,  no rigidity, well healed midline vertical scar  Psychiatric: appropriate mood and affect.   Skin: warm  -Bettie Jane PGY-2 PHYSICAL EXAM:   General: in no acute distress  HEENT: NC/AT, airway patent, scleral icterus  Cardiovascular: regular rate and rhythm, + S1/S2, no murmurs, rubs, gallops appreciated  Respiratory: diffusely diminished, nonlabored respirations  Abdominal: soft, epigastric, RUQ and LLQ ttp,  no rigidity, well healed midline vertical scar  Psychiatric: appropriate mood and affect.   Skin: warm, mild jaundiced  -Bettie Jane PGY-2

## 2020-01-31 NOTE — ED PROVIDER NOTE - PMH
Acute cholecystitis    Acute Pancreatitis    Age Related Macular Degeneration    Diabetes Mellitus Type II    Essential (Primary) Hypertension

## 2020-02-01 DIAGNOSIS — N40.0 BENIGN PROSTATIC HYPERPLASIA WITHOUT LOWER URINARY TRACT SYMPTOMS: ICD-10-CM

## 2020-02-01 DIAGNOSIS — R10.9 UNSPECIFIED ABDOMINAL PAIN: ICD-10-CM

## 2020-02-01 DIAGNOSIS — Z02.9 ENCOUNTER FOR ADMINISTRATIVE EXAMINATIONS, UNSPECIFIED: ICD-10-CM

## 2020-02-01 DIAGNOSIS — N32.89 OTHER SPECIFIED DISORDERS OF BLADDER: ICD-10-CM

## 2020-02-01 DIAGNOSIS — Z71.89 OTHER SPECIFIED COUNSELING: ICD-10-CM

## 2020-02-01 DIAGNOSIS — R74.0 NONSPECIFIC ELEVATION OF LEVELS OF TRANSAMINASE AND LACTIC ACID DEHYDROGENASE [LDH]: ICD-10-CM

## 2020-02-01 DIAGNOSIS — E11.9 TYPE 2 DIABETES MELLITUS WITHOUT COMPLICATIONS: ICD-10-CM

## 2020-02-01 DIAGNOSIS — I10 ESSENTIAL (PRIMARY) HYPERTENSION: ICD-10-CM

## 2020-02-01 LAB
ALBUMIN SERPL ELPH-MCNC: 3.4 G/DL — SIGNIFICANT CHANGE UP (ref 3.3–5)
ALP SERPL-CCNC: 169 U/L — HIGH (ref 40–120)
ALT FLD-CCNC: 204 U/L — HIGH (ref 10–45)
ANION GAP SERPL CALC-SCNC: 11 MMOL/L — SIGNIFICANT CHANGE UP (ref 5–17)
APPEARANCE UR: CLEAR — SIGNIFICANT CHANGE UP
AST SERPL-CCNC: 50 U/L — HIGH (ref 10–40)
BASOPHILS # BLD AUTO: 0.05 K/UL — SIGNIFICANT CHANGE UP (ref 0–0.2)
BASOPHILS NFR BLD AUTO: 0.6 % — SIGNIFICANT CHANGE UP (ref 0–2)
BILIRUB SERPL-MCNC: 0.8 MG/DL — SIGNIFICANT CHANGE UP (ref 0.2–1.2)
BILIRUB UR-MCNC: NEGATIVE — SIGNIFICANT CHANGE UP
BUN SERPL-MCNC: 17 MG/DL — SIGNIFICANT CHANGE UP (ref 7–23)
CALCIUM SERPL-MCNC: 9.4 MG/DL — SIGNIFICANT CHANGE UP (ref 8.4–10.5)
CHLORIDE SERPL-SCNC: 101 MMOL/L — SIGNIFICANT CHANGE UP (ref 96–108)
CO2 SERPL-SCNC: 21 MMOL/L — LOW (ref 22–31)
COLOR SPEC: YELLOW — SIGNIFICANT CHANGE UP
CREAT SERPL-MCNC: 1.11 MG/DL — SIGNIFICANT CHANGE UP (ref 0.5–1.3)
DIFF PNL FLD: NEGATIVE — SIGNIFICANT CHANGE UP
EOSINOPHIL # BLD AUTO: 0.22 K/UL — SIGNIFICANT CHANGE UP (ref 0–0.5)
EOSINOPHIL NFR BLD AUTO: 2.4 % — SIGNIFICANT CHANGE UP (ref 0–6)
GLUCOSE SERPL-MCNC: 144 MG/DL — HIGH (ref 70–99)
GLUCOSE UR QL: NEGATIVE — SIGNIFICANT CHANGE UP
HAV IGM SER-ACNC: SIGNIFICANT CHANGE UP
HBV CORE IGM SER-ACNC: SIGNIFICANT CHANGE UP
HBV SURFACE AG SER-ACNC: SIGNIFICANT CHANGE UP
HCT VFR BLD CALC: 39.4 % — SIGNIFICANT CHANGE UP (ref 39–50)
HCV AB S/CO SERPL IA: 0.98 S/CO — SIGNIFICANT CHANGE UP (ref 0–0.99)
HCV AB SERPL-IMP: SIGNIFICANT CHANGE UP
HGB BLD-MCNC: 12.4 G/DL — LOW (ref 13–17)
IMM GRANULOCYTES NFR BLD AUTO: 0.4 % — SIGNIFICANT CHANGE UP (ref 0–1.5)
KETONES UR-MCNC: NEGATIVE — SIGNIFICANT CHANGE UP
LEUKOCYTE ESTERASE UR-ACNC: NEGATIVE — SIGNIFICANT CHANGE UP
LYMPHOCYTES # BLD AUTO: 2.33 K/UL — SIGNIFICANT CHANGE UP (ref 1–3.3)
LYMPHOCYTES # BLD AUTO: 25.9 % — SIGNIFICANT CHANGE UP (ref 13–44)
MAGNESIUM SERPL-MCNC: 1.8 MG/DL — SIGNIFICANT CHANGE UP (ref 1.6–2.6)
MCHC RBC-ENTMCNC: 25.8 PG — LOW (ref 27–34)
MCHC RBC-ENTMCNC: 31.5 GM/DL — LOW (ref 32–36)
MCV RBC AUTO: 81.9 FL — SIGNIFICANT CHANGE UP (ref 80–100)
MONOCYTES # BLD AUTO: 0.91 K/UL — HIGH (ref 0–0.9)
MONOCYTES NFR BLD AUTO: 10.1 % — SIGNIFICANT CHANGE UP (ref 2–14)
NEUTROPHILS # BLD AUTO: 5.44 K/UL — SIGNIFICANT CHANGE UP (ref 1.8–7.4)
NEUTROPHILS NFR BLD AUTO: 60.6 % — SIGNIFICANT CHANGE UP (ref 43–77)
NITRITE UR-MCNC: NEGATIVE — SIGNIFICANT CHANGE UP
NRBC # BLD: 0 /100 WBCS — SIGNIFICANT CHANGE UP (ref 0–0)
PH UR: 6.5 — SIGNIFICANT CHANGE UP (ref 5–8)
PHOSPHATE SERPL-MCNC: 2.3 MG/DL — LOW (ref 2.5–4.5)
PLATELET # BLD AUTO: 157 K/UL — SIGNIFICANT CHANGE UP (ref 150–400)
POTASSIUM SERPL-MCNC: 4 MMOL/L — SIGNIFICANT CHANGE UP (ref 3.5–5.3)
POTASSIUM SERPL-SCNC: 4 MMOL/L — SIGNIFICANT CHANGE UP (ref 3.5–5.3)
PROT SERPL-MCNC: 6.3 G/DL — SIGNIFICANT CHANGE UP (ref 6–8.3)
PROT UR-MCNC: ABNORMAL
RAPID RVP RESULT: SIGNIFICANT CHANGE UP
RBC # BLD: 4.81 M/UL — SIGNIFICANT CHANGE UP (ref 4.2–5.8)
RBC # FLD: 14.6 % — HIGH (ref 10.3–14.5)
SODIUM SERPL-SCNC: 133 MMOL/L — LOW (ref 135–145)
SP GR SPEC: >1.05 (ref 1.01–1.02)
UROBILINOGEN FLD QL: NEGATIVE — SIGNIFICANT CHANGE UP
WBC # BLD: 8.99 K/UL — SIGNIFICANT CHANGE UP (ref 3.8–10.5)
WBC # FLD AUTO: 8.99 K/UL — SIGNIFICANT CHANGE UP (ref 3.8–10.5)

## 2020-02-01 PROCEDURE — 12345: CPT | Mod: NC

## 2020-02-01 PROCEDURE — 99223 1ST HOSP IP/OBS HIGH 75: CPT

## 2020-02-01 PROCEDURE — 74181 MRI ABDOMEN W/O CONTRAST: CPT | Mod: 26

## 2020-02-01 RX ORDER — FINASTERIDE 5 MG/1
1 TABLET, FILM COATED ORAL
Qty: 0 | Refills: 0 | DISCHARGE

## 2020-02-01 RX ORDER — HALOPERIDOL DECANOATE 100 MG/ML
2.5 INJECTION INTRAMUSCULAR EVERY 6 HOURS
Refills: 0 | Status: DISCONTINUED | OUTPATIENT
Start: 2020-02-01 | End: 2020-02-07

## 2020-02-01 RX ORDER — ASPIRIN/CALCIUM CARB/MAGNESIUM 324 MG
81 TABLET ORAL DAILY
Refills: 0 | Status: DISCONTINUED | OUTPATIENT
Start: 2020-02-01 | End: 2020-02-03

## 2020-02-01 RX ORDER — PIPERACILLIN AND TAZOBACTAM 4; .5 G/20ML; G/20ML
3.38 INJECTION, POWDER, LYOPHILIZED, FOR SOLUTION INTRAVENOUS EVERY 8 HOURS
Refills: 0 | Status: COMPLETED | OUTPATIENT
Start: 2020-02-01 | End: 2020-02-06

## 2020-02-01 RX ORDER — TAMSULOSIN HYDROCHLORIDE 0.4 MG/1
0.4 CAPSULE ORAL AT BEDTIME
Refills: 0 | Status: DISCONTINUED | OUTPATIENT
Start: 2020-02-01 | End: 2020-02-07

## 2020-02-01 RX ORDER — TAMSULOSIN HYDROCHLORIDE 0.4 MG/1
1 CAPSULE ORAL
Qty: 0 | Refills: 0 | DISCHARGE

## 2020-02-01 RX ORDER — METOPROLOL TARTRATE 50 MG
25 TABLET ORAL
Refills: 0 | Status: DISCONTINUED | OUTPATIENT
Start: 2020-02-01 | End: 2020-02-07

## 2020-02-01 RX ORDER — LOSARTAN POTASSIUM 100 MG/1
50 TABLET, FILM COATED ORAL
Qty: 0 | Refills: 0 | DISCHARGE

## 2020-02-01 RX ORDER — IRBESARTAN 75 MG/1
1 TABLET ORAL
Qty: 0 | Refills: 0 | DISCHARGE

## 2020-02-01 RX ORDER — FINASTERIDE 5 MG/1
5 TABLET, FILM COATED ORAL DAILY
Refills: 0 | Status: DISCONTINUED | OUTPATIENT
Start: 2020-02-01 | End: 2020-02-07

## 2020-02-01 RX ORDER — ERGOCALCIFEROL 1.25 MG/1
1 CAPSULE ORAL
Qty: 0 | Refills: 0 | DISCHARGE

## 2020-02-01 RX ORDER — ALFUZOSIN HYDROCHLORIDE 10 MG/1
10 TABLET, EXTENDED RELEASE ORAL
Qty: 0 | Refills: 0 | DISCHARGE

## 2020-02-01 RX ORDER — METOPROLOL TARTRATE 50 MG
1 TABLET ORAL
Qty: 0 | Refills: 0 | DISCHARGE

## 2020-02-01 RX ORDER — BETHANECHOL CHLORIDE 25 MG
1 TABLET ORAL
Qty: 0 | Refills: 0 | DISCHARGE

## 2020-02-01 RX ORDER — BETHANECHOL CHLORIDE 25 MG
10 TABLET ORAL THREE TIMES A DAY
Refills: 0 | Status: DISCONTINUED | OUTPATIENT
Start: 2020-02-01 | End: 2020-02-07

## 2020-02-01 RX ORDER — OMEPRAZOLE 10 MG/1
1 CAPSULE, DELAYED RELEASE ORAL
Qty: 0 | Refills: 0 | DISCHARGE

## 2020-02-01 RX ORDER — HEPARIN SODIUM 5000 [USP'U]/ML
5000 INJECTION INTRAVENOUS; SUBCUTANEOUS EVERY 8 HOURS
Refills: 0 | Status: DISCONTINUED | OUTPATIENT
Start: 2020-02-01 | End: 2020-02-07

## 2020-02-01 RX ADMIN — HALOPERIDOL DECANOATE 2.5 MILLIGRAM(S): 100 INJECTION INTRAMUSCULAR at 11:38

## 2020-02-01 RX ADMIN — HEPARIN SODIUM 5000 UNIT(S): 5000 INJECTION INTRAVENOUS; SUBCUTANEOUS at 05:47

## 2020-02-01 RX ADMIN — PIPERACILLIN AND TAZOBACTAM 25 GRAM(S): 4; .5 INJECTION, POWDER, LYOPHILIZED, FOR SOLUTION INTRAVENOUS at 05:46

## 2020-02-01 RX ADMIN — Medication 81 MILLIGRAM(S): at 11:38

## 2020-02-01 RX ADMIN — PIPERACILLIN AND TAZOBACTAM 25 GRAM(S): 4; .5 INJECTION, POWDER, LYOPHILIZED, FOR SOLUTION INTRAVENOUS at 11:39

## 2020-02-01 RX ADMIN — FINASTERIDE 5 MILLIGRAM(S): 5 TABLET, FILM COATED ORAL at 11:38

## 2020-02-01 RX ADMIN — Medication 10 MILLIGRAM(S): at 21:17

## 2020-02-01 RX ADMIN — Medication 25 MILLIGRAM(S): at 19:43

## 2020-02-01 RX ADMIN — TAMSULOSIN HYDROCHLORIDE 0.4 MILLIGRAM(S): 0.4 CAPSULE ORAL at 21:28

## 2020-02-01 RX ADMIN — PIPERACILLIN AND TAZOBACTAM 25 GRAM(S): 4; .5 INJECTION, POWDER, LYOPHILIZED, FOR SOLUTION INTRAVENOUS at 21:16

## 2020-02-01 RX ADMIN — Medication 10 MILLIGRAM(S): at 10:17

## 2020-02-01 RX ADMIN — Medication 25 MILLIGRAM(S): at 05:47

## 2020-02-01 RX ADMIN — HEPARIN SODIUM 5000 UNIT(S): 5000 INJECTION INTRAVENOUS; SUBCUTANEOUS at 11:38

## 2020-02-01 RX ADMIN — HEPARIN SODIUM 5000 UNIT(S): 5000 INJECTION INTRAVENOUS; SUBCUTANEOUS at 21:16

## 2020-02-01 NOTE — H&P ADULT - PROBLEM SELECTOR PLAN 4
c/w finasteride and tamsulosin  - also on bethanechol? continue for now and will need to further clarify during day  - making urine

## 2020-02-01 NOTE — CHART NOTE - NSCHARTNOTEFT_GEN_A_CORE
Pt very agitated this am, spoke with him via  phone in Jamaican.  Explained he will need to stay before being sent back to his nursing facility and may require more imaging studies and have to follow up cultures.  He is still agitated and somewhat confused.  He is at risk of falling due to his agitation and he is not redirectible.  Will give 1 dose of Haldol 2.5mg IV now and pRN for severe agitation putting him at risk to self or others.    -F/u MRCP when family can fill out form pt cannot consent  -Continue Zosyn and F/U Blood and urine cultures  -F/U REnal US and repeat CMP (pt refusing labs this am)  Reattempt after Haldol need to see trend of LFTs.   -Dispo back to SNF.     Discussed with hepatology team  Alice Alvarado DO  HOspitalist  [phone]

## 2020-02-01 NOTE — CONSULT NOTE ADULT - ASSESSMENT
Impression:    #Cholestatic liver injury pattern: No biliary duct dilatation on CT abdomen/pelvis, thus lower suspicion for cholodocholithiasis or cholangitis. Patient may have also passed a stone. Differential also includes infectious hepatitis given report fevers prior to presentation (now afebrile on IV antibiotics)  #Leukocytosis: Rule out infectious hepatitis vs. alternate source. No CT evidence of pancreatitis or colitis.   #Dementia    Recommendations:  - US abdomen to reevaluate biliary tree, agree patient may not be able to tolerate MRCP  - check acute hepatitis panel, total Hep B core Ab, Hep C Ab  - trend liver enzymes/bilirubin on CMP every 12 hours  - can continue Zosyn IV for now  - please send blood cultures if febrile      Mone Dogulas PGY-4  Gastroenterology Fellow  Pager #[phone] or [phone]  Pager #[phone] 5pm-7am on weekdays, and on weekends Impression:    #Cholestatic liver injury pattern: No biliary duct dilatation on CT abdomen/pelvis, thus lower suspicion for cholodocholithiasis or cholangitis. Patient may have also passed a stone. Differential also includes infectious hepatitis given report fevers prior to presentation (now afebrile on IV antibiotics)  #Leukocytosis: Rule out infectious hepatitis vs. alternate source. No CT evidence of pancreatitis or colitis.   #Dementia    Recommendations:  - US abdomen ordered - will follow up  - MRCP to better assess biliary tree if patient able to tolerate study    - check acute hepatitis panel, total Hep B core Ab, Hep C Ab  - trend liver enzymes/bilirubin on CMP every 12 hours  - can continue Zosyn IV for now  - please send blood cultures if febrile      Mone Douglas PGY-4  Gastroenterology Fellow  Pager #[phone] or [phone]  Pager #[phone] 5pm-7am on weekdays, and on weekends

## 2020-02-01 NOTE — CONSULT NOTE ADULT - SUBJECTIVE AND OBJECTIVE BOX
Chief Complaint:  Patient is a 89y old  Male who presents with a chief complaint of 89F w/ fever and elevated liver enzymes (01 Feb 2020 02:19)      HPI:    89 year old man with dementia (A&Ox1), h/o acute cholecystitis s/p cholecystectomy, h/o of gallstone pancreatitis, BPH s/p TURP [facility] fever with elevated liver enzymes.    Allergies:  No Known Allergies      Home Medications:    · 	aspirin 81 mg oral tablet: Last Dose Taken:  , 1 tab(s) orally once a day  · 	bethanechol 10 mg oral tablet: Last Dose Taken:  , 1 tab(s) orally 3 times a day  · 	Probiotic Formula oral capsule: Last Dose Taken:  , 1 cap(s) orally once a day  · 	finasteride 5 mg oral tablet: Last Dose Taken:  , 1 tab(s) orally once a day  · 	Flomax 0.4 mg oral capsule: Last Dose Taken:  , 1 cap(s) orally once a day  · 	Metoprolol Tartrate 25 mg oral tablet: Last Dose Taken:  , 1 tab(s) orally 2 times a day  · 	Vitamin D2 50,000 intl units (1.25 mg) oral capsule: Last Dose Taken:  , 1 cap(s) orally three times per month  · 	Milk of Magnesia 8% oral suspension: Last Dose Taken:  , 15 milliliter(s) orally once a day (at bedtime), As Needed  · 	Tylenol 325 mg oral tablet: 2 tab(s) orally every 4 hours, As Needed    Hospital Medications:  aspirin enteric coated 81 milliGRAM(s) Oral daily  bethanechol 10 milliGRAM(s) Oral three times a day  finasteride 5 milliGRAM(s) Oral daily  haloperidol    Injectable 2.5 milliGRAM(s) IV Push every 6 hours PRN  heparin  Injectable 5000 Unit(s) SubCutaneous every 8 hours  metoprolol tartrate 25 milliGRAM(s) Oral two times a day  piperacillin/tazobactam IVPB.. 3.375 Gram(s) IV Intermittent every 8 hours  tamsulosin 0.4 milliGRAM(s) Oral at bedtime      PMHX/PSHX:  Acute cholecystitis  Acute Pancreatitis  Diabetes Mellitus Type II  Age Related Macular Degeneration  Essential (Primary) Hypertension  S/P cholecystectomy  S/P Gastrectomy  Injury of Superficial Femoral Artery  S/P Tonsillectomy  S/P Exploratory Laparotomy  Essential (Primary) Hypertension      Family history:  No pertinent family history in first degree relatives      Social History:     ROS:     General:  No weight loss, fevers, chills, night sweats, fatigue  Eyes:  No vision changes, no yellowing of eyes   ENT:  No throat pain, runny nose  CV:  No chest pain, palpitations  Resp:  No SOB, cough, wheezing  GI:  See HPI  :  No burning with urination, no hematuria   Muscle:  No muscle pain, weakness  Neuro:  No numbness/tingling, memory problems  Psych:  No fatigue, insomnia, mood problems  Heme:  No easy bruisability  Skin:  No rash, itching       PHYSICAL EXAM:     GENERAL:  Appears stated age, well-groomed, well-nourished, no distress  HEENT:  NC/AT,  conjunctivae clear and pink,  no JVD  CHEST:  Full & symmetric excursion, no increased effort, breath sounds clear  HEART:  Regular rhythm, S1, S2, no murmur/rub/S3/S4, no abdominal bruit, no edema  ABDOMEN:  Soft, non-tender, non-distended, normoactive bowel sounds,  no masses ,  EXTREMITIES:  no cyanosis,clubbing or edema  SKIN:  No rash/erythema/ecchymoses/petechiae/wounds/abscess/warm/dry  NEURO:  Alert, oriented    Vital Signs:  Vital Signs Last 24 Hrs  T(C): 36.7 (01 Feb 2020 14:02), Max: 36.8 (01 Feb 2020 04:19)  T(F): 98 (01 Feb 2020 14:02), Max: 98.3 (01 Feb 2020 04:19)  HR: 63 (01 Feb 2020 14:02) (59 - 88)  BP: 143/75 (01 Feb 2020 14:02) (119/62 - 148/90)  BP(mean): --  RR: 18 (01 Feb 2020 14:02) (16 - 18)  SpO2: 95% (01 Feb 2020 14:02) (93% - 96%)  Daily Height in cm: 165.1 (01 Feb 2020 04:58)    Daily     LABS:                        12.9   11.05 )-----------( 164      ( 31 Jan 2020 19:48 )             39.6     01-31    136  |  100  |  21  ----------------------------<  117<H>  4.0   |  22  |  1.18    Ca    9.6      31 Jan 2020 19:48    TPro  6.5  /  Alb  3.7  /  TBili  1.4<H>  /  DBili  x   /  AST  165<H>  /  ALT  346<H>  /  AlkPhos  210<H>  01-31    LIVER FUNCTIONS - ( 31 Jan 2020 19:48 )  Alb: 3.7 g/dL / Pro: 6.5 g/dL / ALK PHOS: 210 U/L / ALT: 346 U/L / AST: 165 U/L / GGT: x             Urinalysis Basic - ( 01 Feb 2020 03:27 )    Color: Yellow / Appearance: Clear / SG: >1.050 / pH: x  Gluc: x / Ketone: Negative  / Bili: Negative / Urobili: Negative   Blood: x / Protein: Trace / Nitrite: Negative   Leuk Esterase: Negative / RBC: 2 /hpf / WBC 14 /HPF   Sq Epi: x / Non Sq Epi: 0 /hpf / Bacteria: Negative      Amylase Serum--      Lipase serum12       Ammonia--      Imaging: Chief Complaint:  Patient is a 89y old  Male who presents with a chief complaint of 89F w/ fever and elevated liver enzymes (01 Feb 2020 02:19)      HPI:    89 year old man with dementia (A&Ox1), h/o acute cholecystitis s/p cholecystectomy, h/o of gallstone pancreatitis, BPH s/p TURP [facility] fever with elevated liver enzymes. Unable to obtain history from patient due to known dementia. Per chart, patient developed abdominal pain and reported fevers at [facility] which did not improve with several days/course of ciprofloxacin. Patient was reportedly sent to hospital for associated elevated liver enzymes which did not improve. In ED, patient remained afebrile with mild leukocytosis. In additional to liver enzymes, total bilirubin and alkaline phosphatase remained normal. CT abdomen/pelvis was ordered and did not reveal any acute findings. Gastroenterology was consulted overnight due to concern for cholangitis. At bedside, patient     Allergies:  No Known Allergies      Home Medications:    · 	aspirin 81 mg oral tablet: Last Dose Taken:  , 1 tab(s) orally once a day  · 	bethanechol 10 mg oral tablet: Last Dose Taken:  , 1 tab(s) orally 3 times a day  · 	Probiotic Formula oral capsule: Last Dose Taken:  , 1 cap(s) orally once a day  · 	finasteride 5 mg oral tablet: Last Dose Taken:  , 1 tab(s) orally once a day  · 	Flomax 0.4 mg oral capsule: Last Dose Taken:  , 1 cap(s) orally once a day  · 	Metoprolol Tartrate 25 mg oral tablet: Last Dose Taken:  , 1 tab(s) orally 2 times a day  · 	Vitamin D2 50,000 intl units (1.25 mg) oral capsule: Last Dose Taken:  , 1 cap(s) orally three times per month  · 	Milk of Magnesia 8% oral suspension: Last Dose Taken:  , 15 milliliter(s) orally once a day (at bedtime), As Needed  · 	Tylenol 325 mg oral tablet: 2 tab(s) orally every 4 hours, As Needed    Hospital Medications:  aspirin enteric coated 81 milliGRAM(s) Oral daily  bethanechol 10 milliGRAM(s) Oral three times a day  finasteride 5 milliGRAM(s) Oral daily  haloperidol    Injectable 2.5 milliGRAM(s) IV Push every 6 hours PRN  heparin  Injectable 5000 Unit(s) SubCutaneous every 8 hours  metoprolol tartrate 25 milliGRAM(s) Oral two times a day  piperacillin/tazobactam IVPB.. 3.375 Gram(s) IV Intermittent every 8 hours  tamsulosin 0.4 milliGRAM(s) Oral at bedtime      PMHX/PSHX:  Acute cholecystitis  Acute Pancreatitis  Diabetes Mellitus Type II  Age Related Macular Degeneration  Essential (Primary) Hypertension  S/P cholecystectomy  S/P Gastrectomy  Injury of Superficial Femoral Artery  S/P Tonsillectomy  S/P Exploratory Laparotomy  Essential (Primary) Hypertension      Family history:  No pertinent family history in first degree relatives      Social History:     ROS:     General:  No weight loss, fevers, chills, night sweats, fatigue  Eyes:  No vision changes, no yellowing of eyes   ENT:  No throat pain, runny nose  CV:  No chest pain, palpitations  Resp:  No SOB, cough, wheezing  GI:  See HPI  :  No burning with urination, no hematuria   Muscle:  No muscle pain, weakness  Neuro:  No numbness/tingling, memory problems  Psych:  No fatigue, insomnia, mood problems  Heme:  No easy bruisability  Skin:  No rash, itching       PHYSICAL EXAM:     GENERAL:  Appears stated age, well-groomed, well-nourished, no distress  HEENT:  NC/AT,  conjunctivae clear and pink,  no JVD  CHEST:  Full & symmetric excursion, no increased effort, breath sounds clear  HEART:  Regular rhythm, S1, S2, no murmur/rub/S3/S4, no abdominal bruit, no edema  ABDOMEN:  Soft, non-tender, non-distended, normoactive bowel sounds,  no masses ,  EXTREMITIES:  no cyanosis,clubbing or edema  SKIN:  No rash/erythema/ecchymoses/petechiae/wounds/abscess/warm/dry  NEURO:  Alert, oriented      Vital Signs:  Vital Signs Last 24 Hrs  T(C): 36.7 (01 Feb 2020 14:02), Max: 36.8 (01 Feb 2020 04:19)  T(F): 98 (01 Feb 2020 14:02), Max: 98.3 (01 Feb 2020 04:19)  HR: 63 (01 Feb 2020 14:02) (59 - 88)  BP: 143/75 (01 Feb 2020 14:02) (119/62 - 148/90)  BP(mean): --  RR: 18 (01 Feb 2020 14:02) (16 - 18)  SpO2: 95% (01 Feb 2020 14:02) (93% - 96%)  Daily Height in cm: 165.1 (01 Feb 2020 04:58)    Daily     LABS:                        12.9   11.05 )-----------( 164      ( 31 Jan 2020 19:48 )             39.6     01-31    136  |  100  |  21  ----------------------------<  117<H>  4.0   |  22  |  1.18    Ca    9.6      31 Jan 2020 19:48    TPro  6.5  /  Alb  3.7  /  TBili  1.4<H>  /  DBili  x   /  AST  165<H>  /  ALT  346<H>  /  AlkPhos  210<H>  01-31    LIVER FUNCTIONS - ( 31 Jan 2020 19:48 )  Alb: 3.7 g/dL / Pro: 6.5 g/dL / ALK PHOS: 210 U/L / ALT: 346 U/L / AST: 165 U/L / GGT: x             Urinalysis Basic - ( 01 Feb 2020 03:27 )    Color: Yellow / Appearance: Clear / SG: >1.050 / pH: x  Gluc: x / Ketone: Negative  / Bili: Negative / Urobili: Negative   Blood: x / Protein: Trace / Nitrite: Negative   Leuk Esterase: Negative / RBC: 2 /hpf / WBC 14 /HPF   Sq Epi: x / Non Sq Epi: 0 /hpf / Bacteria: Negative      Amylase Serum--      Lipase serum12       Ammonia--      Imaging:    < from: CT Abdomen and Pelvis w/ IV Cont (01.31.20 @ 21:19) >  EXAM:  CT ABDOMEN AND PELVIS IC                            PROCEDURE DATE:  01/31/2020            INTERPRETATION:  CLINICAL INFORMATION: Abdominal pain. Elevated liver function tests.    COMPARISON: CT abdomen pelvis 11/29/2018. MRI abdomen dated 12/8/2009.    PROCEDURE:   CT of the Abdomen and Pelvis was performed with intravenous contrast.   Intravenous contrast: 90 ml Omnipaque 350. 10 ml discarded.  Oral contrast: None.  Sagittal and coronal reformats were performed.    FINDINGS:  There is motion artifact present which degrades image quality.  LOWER CHEST: Cardiomegaly.    LIVER: Parenchymal calcifications.  BILE DUCTS: Normal caliber.  GALLBLADDER: Interval Cholecystectomy.  SPLEEN: Within normal limits.  PANCREAS: Within normal limits.  ADRENALS: Within normal limits.  KIDNEYS/URETERS: No hydronephrosis. Stable right renal cyst. A 2.0 cm left renal hyperdense lesion is stable dating back to 2009 when it was characterized as a hemorrhagic cyst.    BLADDER: Mild bladder wall thickening which could be related to chronic outlet obstruction given the mild enlargement of the prostate gland.  REPRODUCTIVE ORGANS: Mildly enlarged.    BOWEL: Status post gastrojejunostomy. No bowel obstruction or bowel inflammation. Appendix is not visualized. No evidence of inflammation in the pericecal region.  PERITONEUM: No ascites.  VESSELS: Atherosclerotic changes.  RETROPERITONEUM/LYMPH NODES: No lymphadenopathy.    ABDOMINAL WALL: Small fat-containing right inguinal hernia.  BONES: Degenerative changes of the spine. Stable L1 compression fracture.    IMPRESSION:   Mildly motion degraded.  Status post cholecystectomy. No biliary tree dilatation. Chief Complaint:  Patient is a 89y old  Male who presents with a chief complaint of 89F w/ fever and elevated liver enzymes (01 Feb 2020 02:19)      HPI:    89 year old man with dementia (A&Ox1), h/o acute cholecystitis s/p cholecystectomy, h/o of gallstone pancreatitis, BPH s/p TURP [facility] fever with elevated liver enzymes. Unable to obtain [place]  through [employer] services at time of evaluation. Per chart, patient developed abdominal pain and reported fevers at [facility] which did not improve with several days/course of ciprofloxacin. Patient was reportedly sent to hospital for associated elevated liver enzymes which did not improve. In ED, patient remained afebrile with mild leukocytosis. In additional to liver enzymes, total bilirubin and alkaline phosphatase remained normal. CT abdomen/pelvis was ordered and did not reveal any acute findings. Gastroenterology was consulted overnight due to concern for cholangitis. At bedside, patient denies abdominal pain, fever, or vomiting.     Allergies:  No Known Allergies      Home Medications:    · 	aspirin 81 mg oral tablet: Last Dose Taken:  , 1 tab(s) orally once a day  · 	bethanechol 10 mg oral tablet: Last Dose Taken:  , 1 tab(s) orally 3 times a day  · 	Probiotic Formula oral capsule: Last Dose Taken:  , 1 cap(s) orally once a day  · 	finasteride 5 mg oral tablet: Last Dose Taken:  , 1 tab(s) orally once a day  · 	Flomax 0.4 mg oral capsule: Last Dose Taken:  , 1 cap(s) orally once a day  · 	Metoprolol Tartrate 25 mg oral tablet: Last Dose Taken:  , 1 tab(s) orally 2 times a day  · 	Vitamin D2 50,000 intl units (1.25 mg) oral capsule: Last Dose Taken:  , 1 cap(s) orally three times per month  · 	Milk of Magnesia 8% oral suspension: Last Dose Taken:  , 15 milliliter(s) orally once a day (at bedtime), As Needed  · 	Tylenol 325 mg oral tablet: 2 tab(s) orally every 4 hours, As Needed    Hospital Medications:  aspirin enteric coated 81 milliGRAM(s) Oral daily  bethanechol 10 milliGRAM(s) Oral three times a day  finasteride 5 milliGRAM(s) Oral daily  haloperidol    Injectable 2.5 milliGRAM(s) IV Push every 6 hours PRN  heparin  Injectable 5000 Unit(s) SubCutaneous every 8 hours  metoprolol tartrate 25 milliGRAM(s) Oral two times a day  piperacillin/tazobactam IVPB.. 3.375 Gram(s) IV Intermittent every 8 hours  tamsulosin 0.4 milliGRAM(s) Oral at bedtime      PMHX/PSHX:  Acute cholecystitis  Acute Pancreatitis  Diabetes Mellitus Type II  Age Related Macular Degeneration  Essential (Primary) Hypertension  S/P cholecystectomy  S/P Gastrectomy  Injury of Superficial Femoral Artery  S/P Tonsillectomy  S/P Exploratory Laparotomy  Essential (Primary) Hypertension      Family history:  No pertinent family history in first degree relatives      Social History:     ROS:     General:  No weight loss, fevers, chills, night sweats, fatigue  Eyes:  No vision changes, no yellowing of eyes   ENT:  No throat pain, runny nose  CV:  No chest pain, palpitations  Resp:  No SOB, cough, wheezing  GI:  See HPI  :  No burning with urination, no hematuria   Muscle:  No muscle pain, weakness  Neuro:  No numbness/tingling, memory problems  Psych:  No fatigue, insomnia, mood problems  Heme:  No easy bruisability  Skin:  No rash, itching       PHYSICAL EXAM:     GENERAL:  Well-appearing, resting comfortably, no distress  HEENT:  Conjunctivae clear and pink,  no JVD  CHEST:  Full & symmetric excursion, no increased effort, breath sounds clear  HEART:  Regular rhythm & rate   ABDOMEN:  Soft, obese, non-distended +mildly tender over RUQ  EXTREMITIES:  no LE edema  SKIN:  No rash/erythema/ecchymoses/petechiae/jaundice   NEURO:  Alert,       Vital Signs:  Vital Signs Last 24 Hrs  T(C): 36.7 (01 Feb 2020 14:02), Max: 36.8 (01 Feb 2020 04:19)  T(F): 98 (01 Feb 2020 14:02), Max: 98.3 (01 Feb 2020 04:19)  HR: 63 (01 Feb 2020 14:02) (59 - 88)  BP: 143/75 (01 Feb 2020 14:02) (119/62 - 148/90)  BP(mean): --  RR: 18 (01 Feb 2020 14:02) (16 - 18)  SpO2: 95% (01 Feb 2020 14:02) (93% - 96%)  Daily Height in cm: 165.1 (01 Feb 2020 04:58)    Daily     LABS:                        12.9   11.05 )-----------( 164      ( 31 Jan 2020 19:48 )             39.6     01-31    136  |  100  |  21  ----------------------------<  117<H>  4.0   |  22  |  1.18    Ca    9.6      31 Jan 2020 19:48    TPro  6.5  /  Alb  3.7  /  TBili  1.4<H>  /  DBili  x   /  AST  165<H>  /  ALT  346<H>  /  AlkPhos  210<H>  01-31    LIVER FUNCTIONS - ( 31 Jan 2020 19:48 )  Alb: 3.7 g/dL / Pro: 6.5 g/dL / ALK PHOS: 210 U/L / ALT: 346 U/L / AST: 165 U/L / GGT: x             Urinalysis Basic - ( 01 Feb 2020 03:27 )    Color: Yellow / Appearance: Clear / SG: >1.050 / pH: x  Gluc: x / Ketone: Negative  / Bili: Negative / Urobili: Negative   Blood: x / Protein: Trace / Nitrite: Negative   Leuk Esterase: Negative / RBC: 2 /hpf / WBC 14 /HPF   Sq Epi: x / Non Sq Epi: 0 /hpf / Bacteria: Negative      Amylase Serum--      Lipase serum12       Ammonia--      Imaging:    < from: CT Abdomen and Pelvis w/ IV Cont (01.31.20 @ 21:19) >  EXAM:  CT ABDOMEN AND PELVIS IC                            PROCEDURE DATE:  01/31/2020            INTERPRETATION:  CLINICAL INFORMATION: Abdominal pain. Elevated liver function tests.    COMPARISON: CT abdomen pelvis 11/29/2018. MRI abdomen dated 12/8/2009.    PROCEDURE:   CT of the Abdomen and Pelvis was performed with intravenous contrast.   Intravenous contrast: 90 ml Omnipaque 350. 10 ml discarded.  Oral contrast: None.  Sagittal and coronal reformats were performed.    FINDINGS:  There is motion artifact present which degrades image quality.  LOWER CHEST: Cardiomegaly.    LIVER: Parenchymal calcifications.  BILE DUCTS: Normal caliber.  GALLBLADDER: Interval Cholecystectomy.  SPLEEN: Within normal limits.  PANCREAS: Within normal limits.  ADRENALS: Within normal limits.  KIDNEYS/URETERS: No hydronephrosis. Stable right renal cyst. A 2.0 cm left renal hyperdense lesion is stable dating back to 2009 when it was characterized as a hemorrhagic cyst.    BLADDER: Mild bladder wall thickening which could be related to chronic outlet obstruction given the mild enlargement of the prostate gland.  REPRODUCTIVE ORGANS: Mildly enlarged.    BOWEL: Status post gastrojejunostomy. No bowel obstruction or bowel inflammation. Appendix is not visualized. No evidence of inflammation in the pericecal region.  PERITONEUM: No ascites.  VESSELS: Atherosclerotic changes.  RETROPERITONEUM/LYMPH NODES: No lymphadenopathy.    ABDOMINAL WALL: Small fat-containing right inguinal hernia.  BONES: Degenerative changes of the spine. Stable L1 compression fracture.    IMPRESSION:   Mildly motion degraded.  Status post cholecystectomy. No biliary tree dilatation.

## 2020-02-01 NOTE — H&P ADULT - ATTENDING COMMENTS
Patient assigned to me by night hospitalist in charge for management and care for patient for this evening only. Care to be resumed by day hospitalist at 08:00 in the morning and thereafter.     Lio Garces MD  Department of Hospital Medicine  pager: [phone] (available from 20:00 to 08:00)

## 2020-02-01 NOTE — H&P ADULT - ASSESSMENT
89M w/ dementia (A&Ox1) hx of acute cholecystitis s/p cholecystectomy, hx of gallstone pancreatitis, BPH s/p TURP, HTN presents from SNF to [facility] for evaluation of fever with elevated liver enzymes admitted to medicine for further evaluation.

## 2020-02-01 NOTE — H&P ADULT - PROBLEM SELECTOR PLAN 7
Transitions of Care Status:  1.  Name of PCP: [facility]  2.  PCP Contacted on Admission: [ ] Y    [X ] N    3.  PCP contacted at Discharge: [ ] Y    [ ] N    [ ] N/A  4.  Post-Discharge Appointment Date and Location:  5.  Summary of Handoff given to PCP:

## 2020-02-01 NOTE — H&P ADULT - NSHPPHYSICALEXAM_GEN_ALL_CORE
Vital Signs Last 24 Hrs  T(C): 36.3 (01 Feb 2020 02:23), Max: 36.5 (31 Jan 2020 20:44)  T(F): 97.4 (01 Feb 2020 02:23), Max: 97.7 (31 Jan 2020 20:44)  HR: 63 (01 Feb 2020 02:23) (59 - 88)  BP: 130/71 (01 Feb 2020 02:23) (119/62 - 148/90)  BP(mean): --  RR: 18 (01 Feb 2020 02:23) (16 - 18)  SpO2: 96% (01 Feb 2020 02:23) (93% - 96%)    GENERAL: NAD, well-developed  HEAD:  Atraumatic, Normocephalic  EYES: PERRL, wearing sunglasses reporting sensitivity to bright light  Mouth: MMM, no lesions  NECK: Supple, no appreciable masses  Lung: normal work of breathing, cta b/l  Chest: S1&S2+, rrr, no m/r/g appreciated  ABDOMEN: bs+, soft, mild generalized abdominal pain to palpation, nd  : No chavez catheter, no CVA tenderness  EXTREMITIES:  radial pulse present b/l, PT present b/l, no pitting edema present  Neuro: A&Oxname (thinks he is in his home), no paralysis in extremities appreciated  SKIN: warm and dry, no visible purulence in exposed areas

## 2020-02-01 NOTE — H&P ADULT - HISTORY OF PRESENT ILLNESS
89M w/ dementia (A&Ox1) hx of acute cholecystitis s/p cholecystectomy, hx of gallstone pancreatitis, BPH s/p TURP, HTN presents from SNF to [facility] for evaluation of fever with elevated liver enzymes. Unable to reach [place]  via [employer]  at hour of admission and advised to call back in am. Some history collected from patient in english however limited by dementia with rest from daughter Ms. Caterina Nogueira via telephone and per chart. Per SNF documentation, the patient has had some abdominal discomfort with fever and therefore started on ciprofloxacin and given transaminitis not improving the patient was sent to hospital. Daughter reports that he at baseline only knows name and will not know location. She reports that he generally doesnt look different in ED compared to when she sees him usually however noted intermittent abdominal pain. Patient unreliable reporting he is in his home and does not know date and his only complaint is some generalized abdominal pain. 89M w/ dementia (A&Ox1) hx of acute cholecystitis s/p cholecystectomy, hx of gallstone pancreatitis, BPH s/p TURP, HTN presents from SNF to [facility] for evaluation of fever with elevated liver enzymes. Unable to reach [place]  via [employer]  at hour of admission and advised to call back in am. Some history collected from patient in english however limited by dementia with rest from daughter Ms. Caterina Nogueira via telephone and per chart. Per SNF documentation, the patient has had some abdominal discomfort with fever and therefore started on ciprofloxacin and given transaminitis not improving the patient was sent to hospital. Daughter reports that he at baseline only knows name and will not know location. She reports that he generally doesnt look different in ED compared to when she sees him usually however noted intermittent abdominal pain. Patient unreliable reporting he is in his home and does not know date and his only complaint is some generalized abdominal pain.\    In the ED, VS 97.6, 148/90, 88, 18 93%RA  s/p zosyn (21:45), 1LNS

## 2020-02-01 NOTE — H&P ADULT - NSHPLABSRESULTS_GEN_ALL_CORE
Personally reviewed available labs, imaging and ekg  Labs  CBC Full  -  ( 31 Jan 2020 19:48 )  WBC Count : 11.05 K/uL  RBC Count : 4.83 M/uL  Hemoglobin : 12.9 g/dL  Hematocrit : 39.6 %  Platelet Count - Automated : 164 K/uL  Mean Cell Volume : 82.0 fl  Mean Cell Hemoglobin : 26.7 pg  Mean Cell Hemoglobin Concentration : 32.6 gm/dL  Auto Neutrophil # : 7.02 K/uL  Auto Lymphocyte # : 2.59 K/uL  Auto Monocyte # : 0.91 K/uL  Auto Eosinophil # : 0.44 K/uL  Auto Basophil # : 0.04 K/uL  Auto Neutrophil % : 63.5 %  Auto Lymphocyte % : 23.4 %  Auto Monocyte % : 8.2 %  Auto Eosinophil % : 4.0 %  Auto Basophil % : 0.4 %  01-31  136  |  100  |  21  ----------------------------<  117<H>  4.0   |  22  |  1.18  Ca    9.6      31 Jan 2020 19:48  TPro  6.5  /  Alb  3.7  /  TBili  1.4<H>  /  DBili  x   /  AST  165<H>  /  ALT  346<H>  /  AlkPhos  210<H>  01-31  19:48 - VBG - pH: 7.41  | pCO2: 40    | pO2: 45    | Lactate: 1.8    blood culture collected in the ED  Imaging  CT a/p does not demonstrate bowel obstruction  < from: CT Abdomen and Pelvis w/ IV Cont (01.31.20 @ 21:19) >    FINDINGS:  There is motion artifact present which degrades image quality.  LOWER CHEST: Cardiomegaly.    LIVER: Parenchymal calcifications.  BILE DUCTS: Normal caliber.  GALLBLADDER: Interval Cholecystectomy.  SPLEEN: Within normal limits.  PANCREAS: Within normal limits.  ADRENALS: Within normal limits.  KIDNEYS/URETERS: No hydronephrosis. Stable right renal cyst. A 2.0 cm left renal hyperdense lesion is stable dating back to 2009 when it was characterized as a hemorrhagic cyst.    BLADDER: Mild bladder wall thickening which could be related to chronic outlet obstruction given the mild enlargement of the prostate gland.  REPRODUCTIVE ORGANS: Mildly enlarged.    BOWEL: Status post gastrojejunostomy. No bowel obstruction or bowel inflammation. Appendix is not visualized. No evidence of inflammation in the pericecal region.  PERITONEUM: No ascites.  VESSELS: Atherosclerotic changes.  RETROPERITONEUM/LYMPH NODES: No lymphadenopathy.    ABDOMINAL WALL: Small fat-containing right inguinal hernia.  BONES: Degenerative changes of the spine. Stable L1 compression fracture.    IMPRESSION:   Mildly motion degraded.  Status post cholecystectomy. No biliary tree dilatation.    < end of copied text >      EKG ordered

## 2020-02-01 NOTE — H&P ADULT - NSICDXPASTSURGICALHX_GEN_ALL_CORE_FT
PAST SURGICAL HISTORY:  Essential (Primary) Hypertension     Injury of Superficial Femoral Artery repaired    S/P cholecystectomy     S/P Exploratory Laparotomy reportedly for gastric ulcer, unknown procedure    S/P Gastrectomy     S/P Tonsillectomy

## 2020-02-01 NOTE — H&P ADULT - NSICDXPASTMEDICALHX_GEN_ALL_CORE_FT
PAST MEDICAL HISTORY:  Acute cholecystitis     Acute Pancreatitis     Age Related Macular Degeneration     Diabetes Mellitus Type II     Essential (Primary) Hypertension

## 2020-02-01 NOTE — H&P ADULT - PROBLEM SELECTOR PLAN 1
per discussion w/ EM physician Dr. Bettie Jane GI consulted and requested imaging and will f/u in am  - monitor w/ cmp  - pending hep panel  - continue w/ zosyn as patient has transaminitis without patient fever on cipro.  - blood culture collected

## 2020-02-02 LAB
ALBUMIN SERPL ELPH-MCNC: 3.3 G/DL — SIGNIFICANT CHANGE UP (ref 3.3–5)
ALBUMIN SERPL ELPH-MCNC: 3.3 G/DL — SIGNIFICANT CHANGE UP (ref 3.3–5)
ALBUMIN SERPL ELPH-MCNC: 3.8 G/DL — SIGNIFICANT CHANGE UP (ref 3.3–5)
ALP SERPL-CCNC: 160 U/L — HIGH (ref 40–120)
ALP SERPL-CCNC: 160 U/L — HIGH (ref 40–120)
ALP SERPL-CCNC: 169 U/L — HIGH (ref 40–120)
ALT FLD-CCNC: 157 U/L — HIGH (ref 10–45)
ALT FLD-CCNC: 164 U/L — HIGH (ref 10–45)
ALT FLD-CCNC: 165 U/L — HIGH (ref 10–45)
ANION GAP SERPL CALC-SCNC: 11 MMOL/L — SIGNIFICANT CHANGE UP (ref 5–17)
ANION GAP SERPL CALC-SCNC: 12 MMOL/L — SIGNIFICANT CHANGE UP (ref 5–17)
ANION GAP SERPL CALC-SCNC: 13 MMOL/L — SIGNIFICANT CHANGE UP (ref 5–17)
APTT BLD: 30.9 SEC — SIGNIFICANT CHANGE UP (ref 27.5–36.3)
AST SERPL-CCNC: 36 U/L — SIGNIFICANT CHANGE UP (ref 10–40)
AST SERPL-CCNC: 37 U/L — SIGNIFICANT CHANGE UP (ref 10–40)
AST SERPL-CCNC: 37 U/L — SIGNIFICANT CHANGE UP (ref 10–40)
BILIRUB SERPL-MCNC: 0.8 MG/DL — SIGNIFICANT CHANGE UP (ref 0.2–1.2)
BILIRUB SERPL-MCNC: 0.8 MG/DL — SIGNIFICANT CHANGE UP (ref 0.2–1.2)
BILIRUB SERPL-MCNC: 0.9 MG/DL — SIGNIFICANT CHANGE UP (ref 0.2–1.2)
BUN SERPL-MCNC: 13 MG/DL — SIGNIFICANT CHANGE UP (ref 7–23)
BUN SERPL-MCNC: 13 MG/DL — SIGNIFICANT CHANGE UP (ref 7–23)
BUN SERPL-MCNC: 15 MG/DL — SIGNIFICANT CHANGE UP (ref 7–23)
CALCIUM SERPL-MCNC: 10.4 MG/DL — SIGNIFICANT CHANGE UP (ref 8.4–10.5)
CALCIUM SERPL-MCNC: 9.5 MG/DL — SIGNIFICANT CHANGE UP (ref 8.4–10.5)
CALCIUM SERPL-MCNC: 9.6 MG/DL — SIGNIFICANT CHANGE UP (ref 8.4–10.5)
CHLORIDE SERPL-SCNC: 100 MMOL/L — SIGNIFICANT CHANGE UP (ref 96–108)
CHLORIDE SERPL-SCNC: 101 MMOL/L — SIGNIFICANT CHANGE UP (ref 96–108)
CHLORIDE SERPL-SCNC: 104 MMOL/L — SIGNIFICANT CHANGE UP (ref 96–108)
CO2 SERPL-SCNC: 20 MMOL/L — LOW (ref 22–31)
CO2 SERPL-SCNC: 21 MMOL/L — LOW (ref 22–31)
CO2 SERPL-SCNC: 23 MMOL/L — SIGNIFICANT CHANGE UP (ref 22–31)
CREAT SERPL-MCNC: 0.98 MG/DL — SIGNIFICANT CHANGE UP (ref 0.5–1.3)
CREAT SERPL-MCNC: 1 MG/DL — SIGNIFICANT CHANGE UP (ref 0.5–1.3)
CREAT SERPL-MCNC: 1.2 MG/DL — SIGNIFICANT CHANGE UP (ref 0.5–1.3)
CULTURE RESULTS: NO GROWTH — SIGNIFICANT CHANGE UP
GLUCOSE SERPL-MCNC: 124 MG/DL — HIGH (ref 70–99)
GLUCOSE SERPL-MCNC: 124 MG/DL — HIGH (ref 70–99)
GLUCOSE SERPL-MCNC: 153 MG/DL — HIGH (ref 70–99)
HBV CORE AB SER-ACNC: SIGNIFICANT CHANGE UP
HCT VFR BLD CALC: 40.5 % — SIGNIFICANT CHANGE UP (ref 39–50)
HCV AB S/CO SERPL IA: 0.9 S/CO — SIGNIFICANT CHANGE UP (ref 0–0.99)
HCV AB SERPL-IMP: SIGNIFICANT CHANGE UP
HGB BLD-MCNC: 12.5 G/DL — LOW (ref 13–17)
INR BLD: 0.92 RATIO — SIGNIFICANT CHANGE UP (ref 0.88–1.16)
INR BLD: 0.97 RATIO — SIGNIFICANT CHANGE UP (ref 0.88–1.16)
MAGNESIUM SERPL-MCNC: 1.8 MG/DL — SIGNIFICANT CHANGE UP (ref 1.6–2.6)
MCHC RBC-ENTMCNC: 25.8 PG — LOW (ref 27–34)
MCHC RBC-ENTMCNC: 30.9 GM/DL — LOW (ref 32–36)
MCV RBC AUTO: 83.5 FL — SIGNIFICANT CHANGE UP (ref 80–100)
NRBC # BLD: 0 /100 WBCS — SIGNIFICANT CHANGE UP (ref 0–0)
PHOSPHATE SERPL-MCNC: 3.1 MG/DL — SIGNIFICANT CHANGE UP (ref 2.5–4.5)
PLATELET # BLD AUTO: 164 K/UL — SIGNIFICANT CHANGE UP (ref 150–400)
POTASSIUM SERPL-MCNC: 4 MMOL/L — SIGNIFICANT CHANGE UP (ref 3.5–5.3)
POTASSIUM SERPL-MCNC: 4.1 MMOL/L — SIGNIFICANT CHANGE UP (ref 3.5–5.3)
POTASSIUM SERPL-MCNC: 4.3 MMOL/L — SIGNIFICANT CHANGE UP (ref 3.5–5.3)
POTASSIUM SERPL-SCNC: 4 MMOL/L — SIGNIFICANT CHANGE UP (ref 3.5–5.3)
POTASSIUM SERPL-SCNC: 4.1 MMOL/L — SIGNIFICANT CHANGE UP (ref 3.5–5.3)
POTASSIUM SERPL-SCNC: 4.3 MMOL/L — SIGNIFICANT CHANGE UP (ref 3.5–5.3)
PROT SERPL-MCNC: 6.2 G/DL — SIGNIFICANT CHANGE UP (ref 6–8.3)
PROT SERPL-MCNC: 6.5 G/DL — SIGNIFICANT CHANGE UP (ref 6–8.3)
PROT SERPL-MCNC: 7.2 G/DL — SIGNIFICANT CHANGE UP (ref 6–8.3)
PROTHROM AB SERPL-ACNC: 10.5 SEC — SIGNIFICANT CHANGE UP (ref 10–13.1)
PROTHROM AB SERPL-ACNC: 11.1 SEC — SIGNIFICANT CHANGE UP (ref 10–13.1)
RBC # BLD: 4.85 M/UL — SIGNIFICANT CHANGE UP (ref 4.2–5.8)
RBC # FLD: 14.7 % — HIGH (ref 10.3–14.5)
SODIUM SERPL-SCNC: 133 MMOL/L — LOW (ref 135–145)
SODIUM SERPL-SCNC: 136 MMOL/L — SIGNIFICANT CHANGE UP (ref 135–145)
SODIUM SERPL-SCNC: 136 MMOL/L — SIGNIFICANT CHANGE UP (ref 135–145)
SPECIMEN SOURCE: SIGNIFICANT CHANGE UP
WBC # BLD: 7.51 K/UL — SIGNIFICANT CHANGE UP (ref 3.8–10.5)
WBC # FLD AUTO: 7.51 K/UL — SIGNIFICANT CHANGE UP (ref 3.8–10.5)

## 2020-02-02 PROCEDURE — 99233 SBSQ HOSP IP/OBS HIGH 50: CPT

## 2020-02-02 PROCEDURE — 99222 1ST HOSP IP/OBS MODERATE 55: CPT

## 2020-02-02 RX ADMIN — Medication 81 MILLIGRAM(S): at 11:17

## 2020-02-02 RX ADMIN — FINASTERIDE 5 MILLIGRAM(S): 5 TABLET, FILM COATED ORAL at 11:17

## 2020-02-02 RX ADMIN — TAMSULOSIN HYDROCHLORIDE 0.4 MILLIGRAM(S): 0.4 CAPSULE ORAL at 21:24

## 2020-02-02 RX ADMIN — PIPERACILLIN AND TAZOBACTAM 25 GRAM(S): 4; .5 INJECTION, POWDER, LYOPHILIZED, FOR SOLUTION INTRAVENOUS at 21:22

## 2020-02-02 RX ADMIN — Medication 25 MILLIGRAM(S): at 05:24

## 2020-02-02 RX ADMIN — PIPERACILLIN AND TAZOBACTAM 25 GRAM(S): 4; .5 INJECTION, POWDER, LYOPHILIZED, FOR SOLUTION INTRAVENOUS at 05:23

## 2020-02-02 RX ADMIN — Medication 10 MILLIGRAM(S): at 13:08

## 2020-02-02 RX ADMIN — PIPERACILLIN AND TAZOBACTAM 25 GRAM(S): 4; .5 INJECTION, POWDER, LYOPHILIZED, FOR SOLUTION INTRAVENOUS at 13:09

## 2020-02-02 RX ADMIN — Medication 10 MILLIGRAM(S): at 05:24

## 2020-02-02 RX ADMIN — Medication 25 MILLIGRAM(S): at 17:10

## 2020-02-02 RX ADMIN — HEPARIN SODIUM 5000 UNIT(S): 5000 INJECTION INTRAVENOUS; SUBCUTANEOUS at 21:22

## 2020-02-02 RX ADMIN — HEPARIN SODIUM 5000 UNIT(S): 5000 INJECTION INTRAVENOUS; SUBCUTANEOUS at 05:24

## 2020-02-02 RX ADMIN — HEPARIN SODIUM 5000 UNIT(S): 5000 INJECTION INTRAVENOUS; SUBCUTANEOUS at 13:08

## 2020-02-02 RX ADMIN — Medication 10 MILLIGRAM(S): at 21:23

## 2020-02-02 NOTE — PROGRESS NOTE ADULT - SUBJECTIVE AND OBJECTIVE BOX
PROGRESS NOTE:     Patient is a 89y old  Male who presents with a chief complaint of 89F w/ fever and elevated liver enzymes (01 Feb 2020 15:32)      SUBJECTIVE / OVERNIGHT EVENTS: More calm today    ADDITIONAL REVIEW OF SYSTEMS:  No fevers or chills  NO n/v/d    MEDICATIONS  (STANDING):  aspirin enteric coated 81 milliGRAM(s) Oral daily  bethanechol 10 milliGRAM(s) Oral three times a day  finasteride 5 milliGRAM(s) Oral daily  heparin  Injectable 5000 Unit(s) SubCutaneous every 8 hours  metoprolol tartrate 25 milliGRAM(s) Oral two times a day  piperacillin/tazobactam IVPB.. 3.375 Gram(s) IV Intermittent every 8 hours  tamsulosin 0.4 milliGRAM(s) Oral at bedtime    MEDICATIONS  (PRN):  haloperidol    Injectable 2.5 milliGRAM(s) IV Push every 6 hours PRN severe agitation      CAPILLARY BLOOD GLUCOSE        I&O's Summary    01 Feb 2020 07:01  -  02 Feb 2020 07:00  --------------------------------------------------------  IN: 1020 mL / OUT: 1000 mL / NET: 20 mL    02 Feb 2020 07:01  -  02 Feb 2020 12:07  --------------------------------------------------------  IN: 450 mL / OUT: 0 mL / NET: 450 mL        PHYSICAL EXAM:  Vital Signs Last 24 Hrs  T(C): 36.3 (02 Feb 2020 11:00), Max: 36.9 (01 Feb 2020 19:36)  T(F): 97.3 (02 Feb 2020 11:00), Max: 98.5 (01 Feb 2020 19:36)  HR: 69 (02 Feb 2020 11:00) (63 - 76)  BP: 125/57 (02 Feb 2020 11:00) (109/60 - 158/84)  BP(mean): --  RR: 18 (02 Feb 2020 11:00) (17 - 18)  SpO2: 93% (02 Feb 2020 11:00) (93% - 96%)    CONSTITUTIONAL: NAD, well-developed, non toxic, calm  RESPIRATORY: Normal respiratory effort; lungs are clear to auscultation bilaterally  CARDIOVASCULAR: Regular rate and rhythm, normal S1 and S2, no murmur/rub/gallop; No lower extremity edema; Peripheral pulses are 2+ bilaterally  ABDOMEN: Nontender to palpation, normoactive bowel sounds, no rebound/guarding; No hepatosplenomegaly  MUSCLOSKELETAL: no clubbing or cyanosis of digits; no joint swelling or tenderness to palpation  PSYCH: A+O to person, place, and time x 1-2;     LABS:                        12.5   7.51  )-----------( 164      ( 02 Feb 2020 06:44 )             40.5     02-02    136  |  101  |  13  ----------------------------<  124<H>  4.0   |  23  |  1.00    Ca    9.6      02 Feb 2020 06:44  Phos  3.1     02-02  Mg     1.8     02-02    TPro  6.5  /  Alb  3.3  /  TBili  0.9  /  DBili  0.3<H>  /  AST  37  /  ALT  165<H>  /  AlkPhos  160<H>  02-02    PT/INR - ( 02 Feb 2020 09:05 )   PT: 10.5 sec;   INR: 0.92 ratio         PTT - ( 01 Feb 2020 22:37 )  PTT:30.9 sec      Urinalysis Basic - ( 01 Feb 2020 03:27 )    Color: Yellow / Appearance: Clear / SG: >1.050 / pH: x  Gluc: x / Ketone: Negative  / Bili: Negative / Urobili: Negative   Blood: x / Protein: Trace / Nitrite: Negative   Leuk Esterase: Negative / RBC: 2 /hpf / WBC 14 /HPF   Sq Epi: x / Non Sq Epi: 0 /hpf / Bacteria: Negative        Culture - Urine (collected 01 Feb 2020 10:31)  Source: .Urine Clean Catch (Midstream)  Final Report (02 Feb 2020 11:00):    No growth    Culture - Blood (collected 01 Feb 2020 01:22)  Source: .Blood Blood-Peripheral  Preliminary Report (02 Feb 2020 02:01):    No growth to date.    Culture - Blood (collected 01 Feb 2020 01:16)  Source: .Blood Blood-Peripheral  Preliminary Report (02 Feb 2020 02:01):    No growth to date.        RADIOLOGY & ADDITIONAL TESTS:  Results Reviewed:   Imaging Personally Reviewed:  Electrocardiogram Personally Reviewed:    COORDINATION OF CARE:  Care Discussed with Consultants/Other Providers [Y/N]:  Prior or Outpatient Records Reviewed [Y/N]:

## 2020-02-02 NOTE — PROGRESS NOTE ADULT - PROBLEM SELECTOR PLAN 7
Transitions of Care Status:  1.  Name of PCP: [facility]  2.  PCP Contacted on Admission: [ ] Y    [X ] N    3.  PCP contacted at Discharge: [ ] Y    [ ] N    [ ] N/A  4.  Post-Discharge Appointment Date and Location:  5.  Summary of Handoff given to PCP:

## 2020-02-02 NOTE — PROGRESS NOTE ADULT - PROBLEM SELECTOR PLAN 1
per discussion w/ EM physician Dr. Bettie Jane GI consulted and requested imaging and will f/u in am  - monitor w/ cmp- downtrending now  - pending hep panel  - continue w/ zosyn as patient has transaminitis without patient fever on cipro.  - blood culture NGTD  -F/U MRCP result then hepatology recs

## 2020-02-02 NOTE — PROGRESS NOTE ADULT - ASSESSMENT
89M w/ dementia (A&Ox1) hx of acute cholecystitis s/p cholecystectomy, hx of gallstone pancreatitis, BPH s/p TURP, HTN presents from [facility] to [facility] for evaluation of fever with elevated liver enzymes.

## 2020-02-02 NOTE — PROGRESS NOTE ADULT - ATTENDING COMMENTS
Alice Alvarado DO  Hosptialist  [phone]    Dispo; Pending results of the MRCP may DC antibiotics if no source identified and monitor off abx.  Eventually DC back to LTC.  AMbulates well with rolling walker and was flight risk.

## 2020-02-03 ENCOUNTER — TRANSCRIPTION ENCOUNTER (OUTPATIENT)
Age: 85
End: 2020-02-03

## 2020-02-03 DIAGNOSIS — K80.50 CALCULUS OF BILE DUCT WITHOUT CHOLANGITIS OR CHOLECYSTITIS WITHOUT OBSTRUCTION: ICD-10-CM

## 2020-02-03 LAB
ALBUMIN SERPL ELPH-MCNC: 3.4 G/DL — SIGNIFICANT CHANGE UP (ref 3.3–5)
ALBUMIN SERPL ELPH-MCNC: 3.8 G/DL — SIGNIFICANT CHANGE UP (ref 3.3–5)
ALP SERPL-CCNC: 147 U/L — HIGH (ref 40–120)
ALP SERPL-CCNC: 147 U/L — HIGH (ref 40–120)
ALT FLD-CCNC: 125 U/L — HIGH (ref 10–45)
ALT FLD-CCNC: 129 U/L — HIGH (ref 10–45)
ANION GAP SERPL CALC-SCNC: 13 MMOL/L — SIGNIFICANT CHANGE UP (ref 5–17)
ANION GAP SERPL CALC-SCNC: 14 MMOL/L — SIGNIFICANT CHANGE UP (ref 5–17)
AST SERPL-CCNC: 31 U/L — SIGNIFICANT CHANGE UP (ref 10–40)
AST SERPL-CCNC: 48 U/L — HIGH (ref 10–40)
BASOPHILS # BLD AUTO: 0.04 K/UL — SIGNIFICANT CHANGE UP (ref 0–0.2)
BASOPHILS NFR BLD AUTO: 0.5 % — SIGNIFICANT CHANGE UP (ref 0–2)
BILIRUB SERPL-MCNC: 0.6 MG/DL — SIGNIFICANT CHANGE UP (ref 0.2–1.2)
BILIRUB SERPL-MCNC: 0.6 MG/DL — SIGNIFICANT CHANGE UP (ref 0.2–1.2)
BUN SERPL-MCNC: 18 MG/DL — SIGNIFICANT CHANGE UP (ref 7–23)
BUN SERPL-MCNC: 23 MG/DL — SIGNIFICANT CHANGE UP (ref 7–23)
CALCIUM SERPL-MCNC: 10.1 MG/DL — SIGNIFICANT CHANGE UP (ref 8.4–10.5)
CALCIUM SERPL-MCNC: 10.3 MG/DL — SIGNIFICANT CHANGE UP (ref 8.4–10.5)
CHLORIDE SERPL-SCNC: 102 MMOL/L — SIGNIFICANT CHANGE UP (ref 96–108)
CHLORIDE SERPL-SCNC: 102 MMOL/L — SIGNIFICANT CHANGE UP (ref 96–108)
CO2 SERPL-SCNC: 21 MMOL/L — LOW (ref 22–31)
CO2 SERPL-SCNC: 22 MMOL/L — SIGNIFICANT CHANGE UP (ref 22–31)
CREAT SERPL-MCNC: 1.16 MG/DL — SIGNIFICANT CHANGE UP (ref 0.5–1.3)
CREAT SERPL-MCNC: 1.24 MG/DL — SIGNIFICANT CHANGE UP (ref 0.5–1.3)
EOSINOPHIL # BLD AUTO: 0.29 K/UL — SIGNIFICANT CHANGE UP (ref 0–0.5)
EOSINOPHIL NFR BLD AUTO: 3.5 % — SIGNIFICANT CHANGE UP (ref 0–6)
GLUCOSE SERPL-MCNC: 130 MG/DL — HIGH (ref 70–99)
GLUCOSE SERPL-MCNC: 140 MG/DL — HIGH (ref 70–99)
HCT VFR BLD CALC: 42.5 % — SIGNIFICANT CHANGE UP (ref 39–50)
HGB BLD-MCNC: 13.1 G/DL — SIGNIFICANT CHANGE UP (ref 13–17)
IMM GRANULOCYTES NFR BLD AUTO: 0.7 % — SIGNIFICANT CHANGE UP (ref 0–1.5)
LYMPHOCYTES # BLD AUTO: 2.84 K/UL — SIGNIFICANT CHANGE UP (ref 1–3.3)
LYMPHOCYTES # BLD AUTO: 34.6 % — SIGNIFICANT CHANGE UP (ref 13–44)
MCHC RBC-ENTMCNC: 25.7 PG — LOW (ref 27–34)
MCHC RBC-ENTMCNC: 30.8 GM/DL — LOW (ref 32–36)
MCV RBC AUTO: 83.3 FL — SIGNIFICANT CHANGE UP (ref 80–100)
MONOCYTES # BLD AUTO: 0.8 K/UL — SIGNIFICANT CHANGE UP (ref 0–0.9)
MONOCYTES NFR BLD AUTO: 9.7 % — SIGNIFICANT CHANGE UP (ref 2–14)
NEUTROPHILS # BLD AUTO: 4.18 K/UL — SIGNIFICANT CHANGE UP (ref 1.8–7.4)
NEUTROPHILS NFR BLD AUTO: 51 % — SIGNIFICANT CHANGE UP (ref 43–77)
NRBC # BLD: 0 /100 WBCS — SIGNIFICANT CHANGE UP (ref 0–0)
PLATELET # BLD AUTO: 176 K/UL — SIGNIFICANT CHANGE UP (ref 150–400)
POTASSIUM SERPL-MCNC: 4 MMOL/L — SIGNIFICANT CHANGE UP (ref 3.5–5.3)
POTASSIUM SERPL-MCNC: 4.3 MMOL/L — SIGNIFICANT CHANGE UP (ref 3.5–5.3)
POTASSIUM SERPL-SCNC: 4 MMOL/L — SIGNIFICANT CHANGE UP (ref 3.5–5.3)
POTASSIUM SERPL-SCNC: 4.3 MMOL/L — SIGNIFICANT CHANGE UP (ref 3.5–5.3)
PROT SERPL-MCNC: 6.5 G/DL — SIGNIFICANT CHANGE UP (ref 6–8.3)
PROT SERPL-MCNC: 7 G/DL — SIGNIFICANT CHANGE UP (ref 6–8.3)
RBC # BLD: 5.1 M/UL — SIGNIFICANT CHANGE UP (ref 4.2–5.8)
RBC # FLD: 14.5 % — SIGNIFICANT CHANGE UP (ref 10.3–14.5)
SODIUM SERPL-SCNC: 136 MMOL/L — SIGNIFICANT CHANGE UP (ref 135–145)
SODIUM SERPL-SCNC: 138 MMOL/L — SIGNIFICANT CHANGE UP (ref 135–145)
WBC # BLD: 8.21 K/UL — SIGNIFICANT CHANGE UP (ref 3.8–10.5)
WBC # FLD AUTO: 8.21 K/UL — SIGNIFICANT CHANGE UP (ref 3.8–10.5)

## 2020-02-03 PROCEDURE — 76857 US EXAM PELVIC LIMITED: CPT | Mod: 26

## 2020-02-03 PROCEDURE — 76700 US EXAM ABDOM COMPLETE: CPT | Mod: 26

## 2020-02-03 PROCEDURE — 99222 1ST HOSP IP/OBS MODERATE 55: CPT | Mod: GC

## 2020-02-03 PROCEDURE — 99233 SBSQ HOSP IP/OBS HIGH 50: CPT

## 2020-02-03 RX ORDER — SODIUM CHLORIDE 9 MG/ML
1000 INJECTION, SOLUTION INTRAVENOUS
Refills: 0 | Status: DISCONTINUED | OUTPATIENT
Start: 2020-02-03 | End: 2020-02-04

## 2020-02-03 RX ADMIN — Medication 10 MILLIGRAM(S): at 13:10

## 2020-02-03 RX ADMIN — SODIUM CHLORIDE 125 MILLILITER(S): 9 INJECTION, SOLUTION INTRAVENOUS at 22:33

## 2020-02-03 RX ADMIN — Medication 10 MILLIGRAM(S): at 22:27

## 2020-02-03 RX ADMIN — HEPARIN SODIUM 5000 UNIT(S): 5000 INJECTION INTRAVENOUS; SUBCUTANEOUS at 05:19

## 2020-02-03 RX ADMIN — PIPERACILLIN AND TAZOBACTAM 25 GRAM(S): 4; .5 INJECTION, POWDER, LYOPHILIZED, FOR SOLUTION INTRAVENOUS at 22:33

## 2020-02-03 RX ADMIN — TAMSULOSIN HYDROCHLORIDE 0.4 MILLIGRAM(S): 0.4 CAPSULE ORAL at 22:26

## 2020-02-03 RX ADMIN — Medication 25 MILLIGRAM(S): at 05:18

## 2020-02-03 RX ADMIN — PIPERACILLIN AND TAZOBACTAM 25 GRAM(S): 4; .5 INJECTION, POWDER, LYOPHILIZED, FOR SOLUTION INTRAVENOUS at 13:10

## 2020-02-03 RX ADMIN — FINASTERIDE 5 MILLIGRAM(S): 5 TABLET, FILM COATED ORAL at 12:09

## 2020-02-03 RX ADMIN — HEPARIN SODIUM 5000 UNIT(S): 5000 INJECTION INTRAVENOUS; SUBCUTANEOUS at 22:27

## 2020-02-03 RX ADMIN — PIPERACILLIN AND TAZOBACTAM 25 GRAM(S): 4; .5 INJECTION, POWDER, LYOPHILIZED, FOR SOLUTION INTRAVENOUS at 05:19

## 2020-02-03 RX ADMIN — HEPARIN SODIUM 5000 UNIT(S): 5000 INJECTION INTRAVENOUS; SUBCUTANEOUS at 13:10

## 2020-02-03 RX ADMIN — Medication 25 MILLIGRAM(S): at 17:56

## 2020-02-03 NOTE — PROGRESS NOTE ADULT - PROBLEM SELECTOR PLAN 8
Transitions of Care Status:  1.  Name of PCP: [facility]  2.  PCP Contacted on Admission: [ ] Y    [X ] N    3.  PCP contacted at Discharge: [ ] Y    [ ] N    [ ] N/A  4.  Post-Discharge Appointment Date and Location:  5.  Summary of Handoff given to PCP:

## 2020-02-03 NOTE — DISCHARGE NOTE NURSING/CASE MANAGEMENT/SOCIAL WORK - PATIENT PORTAL LINK FT
You can access the FollowMyHealth Patient Portal offered by [facility] by registering at the following website: [email or web address]. By joining [facility] [employer]’s FollowMyHealth portal, you will also be able to view your health information using other applications (apps) compatible with our system.

## 2020-02-03 NOTE — CHART NOTE - NSCHARTNOTEFT_GEN_A_CORE
See GI consult note earlier today for details.    BUN and Cr are rising during course of hospitalization.  Patient is NPO after MN for likely ERCP tomorrow.    Ordered maintenance LR at 125 mL/hr

## 2020-02-03 NOTE — PROGRESS NOTE ADULT - ASSESSMENT
89M w/ dementia (A&Ox1), reported hx of acute cholecystitis s/p cholecystectomy, gallstone pancreatitis, gastrojejunostomy, BPH s/p TURP, HTN, DM2 presented from [facility] for evaluation of fever with transaminitis. Patient found to have distal choledocholithiasis on MRCP.

## 2020-02-03 NOTE — PROGRESS NOTE ADULT - SUBJECTIVE AND OBJECTIVE BOX
[facility] Division of Hospital Medicine  Qian Hunt MD  Pager (M-F, 8A-5P): [phone]  Other Times:  [phone]    Patient is a 89y old  Male who presents with a chief complaint of 89F w/ fever and elevated liver enzymes (03 Feb 2020 10:12)      SUBJECTIVE / OVERNIGHT EVENTS: no acute events  ADDITIONAL REVIEW OF SYSTEMS: appears calm, denies any ab pain.    MEDICATIONS  (STANDING):  aspirin enteric coated 81 milliGRAM(s) Oral daily  bethanechol 10 milliGRAM(s) Oral three times a day  finasteride 5 milliGRAM(s) Oral daily  heparin  Injectable 5000 Unit(s) SubCutaneous every 8 hours  metoprolol tartrate 25 milliGRAM(s) Oral two times a day  piperacillin/tazobactam IVPB.. 3.375 Gram(s) IV Intermittent every 8 hours  tamsulosin 0.4 milliGRAM(s) Oral at bedtime    MEDICATIONS  (PRN):  haloperidol    Injectable 2.5 milliGRAM(s) IV Push every 6 hours PRN severe agitation      CAPILLARY BLOOD GLUCOSE        I&O's Summary    02 Feb 2020 07:01  -  03 Feb 2020 07:00  --------------------------------------------------------  IN: 1010 mL / OUT: 0 mL / NET: 1010 mL        PHYSICAL EXAM:  Vital Signs Last 24 Hrs  T(C): 36.4 (03 Feb 2020 04:09), Max: 36.5 (02 Feb 2020 21:14)  T(F): 97.5 (03 Feb 2020 04:09), Max: 97.7 (02 Feb 2020 21:14)  HR: 70 (03 Feb 2020 04:09) (60 - 70)  BP: 130/73 (03 Feb 2020 04:09) (126/56 - 144/69)  BP(mean): --  RR: 18 (03 Feb 2020 04:09) (17 - 18)  SpO2: 94% (03 Feb 2020 04:09) (93% - 94%)    CONSTITUTIONAL: NAD, well-developed, well-groomed  NECK: Supple, no palpable masses; no thyromegaly  RESPIRATORY: Normal respiratory effort; lungs are clear to auscultation bilaterally  CARDIOVASCULAR: normal S1 and S2; No lower extremity edema  ABDOMEN: Nontender to palpation, normoactive bowel sounds, no rebound/guarding; No hepatosplenomegaly  MUSCULOSKELETAL:  no clubbing or cyanosis of digits; no joint swelling or tenderness to palpation  PSYCH: A+O to person; affect appropriate, follows simple commands   SKIN: No rashes; no palpable lesions    LABS:                        13.1   8.21  )-----------( 176      ( 03 Feb 2020 06:05 )             42.5     02-03    138  |  102  |  18  ----------------------------<  140<H>  4.0   |  22  |  1.16    Ca    10.1      03 Feb 2020 06:05  Phos  3.1     02-02  Mg     1.8     02-02    TPro  6.5  /  Alb  3.4  /  TBili  0.6  /  DBili  x   /  AST  31  /  ALT  125<H>  /  AlkPhos  147<H>  02-03    PT/INR - ( 02 Feb 2020 09:05 )   PT: 10.5 sec;   INR: 0.92 ratio         PTT - ( 01 Feb 2020 22:37 )  PTT:30.9 sec          Culture - Urine (collected 01 Feb 2020 10:31)  Source: .Urine Clean Catch (Midstream)  Final Report (02 Feb 2020 11:00):    No growth    Culture - Blood (collected 01 Feb 2020 01:22)  Source: .Blood Blood-Peripheral  Preliminary Report (02 Feb 2020 02:01):    No growth to date.    Culture - Blood (collected 01 Feb 2020 01:16)  Source: .Blood Blood-Peripheral  Preliminary Report (02 Feb 2020 02:01):    No growth to date.        RADIOLOGY & ADDITIONAL TESTS:  Results Reviewed:       < from: US Abdomen Complete (02.03.20 @ 11:18) >  IMPRESSION:     Limited evaluation of the bladder secondary to underdistention.    No sonographic evidence for intrahepatic or extrahepatic biliary dilatation.    Increased echogenicity of the liver, consistent with steatosis.    < end of copied text >    < from: MR MRCP No Cont (02.01.20 @ 17:09) >  FINDINGS:    Artifact renders evaluation of the anterior abdomen essentially nondiagnostic.    LOWER CHEST: Cardiomegaly.    LIVER: Within normal limits.   BILE DUCTS: No intrahepatic biliary ductal dilatation. The CBD measures up to 9 mm. At least least one stone within the distal common bile duct (7:19) measuring up to 7 mm. Question additional small stones at MRCP (14:22)  GALLBLADDER: Cholecystectomy.  SPLEEN: Within normal limits.  PANCREAS: Within normal limits.  ADRENALS: Within normal limits.  KIDNEYS/URETERS: Right cyst. A 2.1 cm left upper pole hemorrhagic cyst. No hydronephrosis.    VISUALIZED PORTIONS:    BOWEL: Gastrojejunostomy.   PERITONEUM: No ascites.  VESSELS: Within normal limits.  RETROPERITONEUM/LYMPH NODES: No lymphadenopathy.    ABDOMINAL WALL: Small fat-containing umbilical hernia.  BONES: Degenerative changes.    IMPRESSION:     Limited study.    Distal choledocholithiasis, as above.    < end of copied text >    Imaging Personally Reviewed:  Electrocardiogram Personally Reviewed:     COORDINATION OF CARE:  Care Discussed with Consultants/Other Providers [Y/N]: medicine NP  Prior or Outpatient Records Reviewed [Y/N]: [facility] Division of Hospital Medicine  Qian Hunt MD  Pager (M-F, 8A-5P): [phone]  Other Times:  [phone]    Patient is a 89y old  Male who presents with a chief complaint of 89F w/ fever and elevated liver enzymes (03 Feb 2020 10:12)      SUBJECTIVE / OVERNIGHT EVENTS: no acute events  ADDITIONAL REVIEW OF SYSTEMS: appears calm, denies any ab pain.    MEDICATIONS  (STANDING):  aspirin enteric coated 81 milliGRAM(s) Oral daily  bethanechol 10 milliGRAM(s) Oral three times a day  finasteride 5 milliGRAM(s) Oral daily  heparin  Injectable 5000 Unit(s) SubCutaneous every 8 hours  metoprolol tartrate 25 milliGRAM(s) Oral two times a day  piperacillin/tazobactam IVPB.. 3.375 Gram(s) IV Intermittent every 8 hours  tamsulosin 0.4 milliGRAM(s) Oral at bedtime    MEDICATIONS  (PRN):  haloperidol    Injectable 2.5 milliGRAM(s) IV Push every 6 hours PRN severe agitation      CAPILLARY BLOOD GLUCOSE        I&O's Summary    02 Feb 2020 07:01  -  03 Feb 2020 07:00  --------------------------------------------------------  IN: 1010 mL / OUT: 0 mL / NET: 1010 mL        PHYSICAL EXAM:  Vital Signs Last 24 Hrs  T(C): 36.4 (03 Feb 2020 04:09), Max: 36.5 (02 Feb 2020 21:14)  T(F): 97.5 (03 Feb 2020 04:09), Max: 97.7 (02 Feb 2020 21:14)  HR: 70 (03 Feb 2020 04:09) (60 - 70)  BP: 130/73 (03 Feb 2020 04:09) (126/56 - 144/69)  BP(mean): --  RR: 18 (03 Feb 2020 04:09) (17 - 18)  SpO2: 94% (03 Feb 2020 04:09) (93% - 94%)    CONSTITUTIONAL: NAD, well-developed, well-groomed  NECK: Supple, no palpable masses; no thyromegaly  RESPIRATORY: Normal respiratory effort; lungs are clear to auscultation bilaterally  CARDIOVASCULAR: normal S1 and S2; No lower extremity edema  ABDOMEN: Nontender to palpation, normoactive bowel sounds, no rebound/guarding; No hepatosplenomegaly  MUSCULOSKELETAL:  no clubbing or cyanosis of digits; no joint swelling or tenderness to palpation  PSYCH: A+O to person, place(in English); affect appropriate, follows simple commands   SKIN: No rashes; no palpable lesions    LABS:                        13.1   8.21  )-----------( 176      ( 03 Feb 2020 06:05 )             42.5     02-03    138  |  102  |  18  ----------------------------<  140<H>  4.0   |  22  |  1.16    Ca    10.1      03 Feb 2020 06:05  Phos  3.1     02-02  Mg     1.8     02-02    TPro  6.5  /  Alb  3.4  /  TBili  0.6  /  DBili  x   /  AST  31  /  ALT  125<H>  /  AlkPhos  147<H>  02-03    PT/INR - ( 02 Feb 2020 09:05 )   PT: 10.5 sec;   INR: 0.92 ratio         PTT - ( 01 Feb 2020 22:37 )  PTT:30.9 sec          Culture - Urine (collected 01 Feb 2020 10:31)  Source: .Urine Clean Catch (Midstream)  Final Report (02 Feb 2020 11:00):    No growth    Culture - Blood (collected 01 Feb 2020 01:22)  Source: .Blood Blood-Peripheral  Preliminary Report (02 Feb 2020 02:01):    No growth to date.    Culture - Blood (collected 01 Feb 2020 01:16)  Source: .Blood Blood-Peripheral  Preliminary Report (02 Feb 2020 02:01):    No growth to date.        RADIOLOGY & ADDITIONAL TESTS:  Results Reviewed:       < from: US Abdomen Complete (02.03.20 @ 11:18) >  IMPRESSION:     Limited evaluation of the bladder secondary to underdistention.    No sonographic evidence for intrahepatic or extrahepatic biliary dilatation.    Increased echogenicity of the liver, consistent with steatosis.    < end of copied text >    < from: MR MRCP No Cont (02.01.20 @ 17:09) >  FINDINGS:    Artifact renders evaluation of the anterior abdomen essentially nondiagnostic.    LOWER CHEST: Cardiomegaly.    LIVER: Within normal limits.   BILE DUCTS: No intrahepatic biliary ductal dilatation. The CBD measures up to 9 mm. At least least one stone within the distal common bile duct (7:19) measuring up to 7 mm. Question additional small stones at MRCP (14:22)  GALLBLADDER: Cholecystectomy.  SPLEEN: Within normal limits.  PANCREAS: Within normal limits.  ADRENALS: Within normal limits.  KIDNEYS/URETERS: Right cyst. A 2.1 cm left upper pole hemorrhagic cyst. No hydronephrosis.    VISUALIZED PORTIONS:    BOWEL: Gastrojejunostomy.   PERITONEUM: No ascites.  VESSELS: Within normal limits.  RETROPERITONEUM/LYMPH NODES: No lymphadenopathy.    ABDOMINAL WALL: Small fat-containing umbilical hernia.  BONES: Degenerative changes.    IMPRESSION:     Limited study.    Distal choledocholithiasis, as above.    < end of copied text >    Imaging Personally Reviewed:  Electrocardiogram Personally Reviewed:     COORDINATION OF CARE:  Care Discussed with Consultants/Other Providers [Y/N]: medicine NP  Prior or Outpatient Records Reviewed [Y/N]:

## 2020-02-03 NOTE — CONSULT NOTE ADULT - ATTENDING COMMENTS
Patient Education     Thank you for visiting the [facility] in [place].    Interested in decreasing your wait time in the Walk-in/Urgent Care Clinic? ?Same day reservations can now be made on line.  Go to [email or web address]?to see the wait times at each [facility] and to make a reservation at the site that is most convenient for you. We will do our best to honor your reservation time, but please understand that wait times are subject to change once you arrive at the clinic.    It is difficult to recognize all elements of any illness or injury in a single visit. The examination, treatment, and x-rays received are on a preliminary basis only. A radiologist will also review your x-rays for final reading and you will be notified if the final reading is different than the preliminary reading. Call your primary care provider if you have questions or problems before your next appointment.  If symptoms worsen or do not resolve please follow-up with your Primary Care Provider (PCP), Urgent Care or the nearest Emergency Room for emergency symptoms. ?    Help us to grow our quality of service!  We want to improve - and you can help!  You may receive a survey in the mail or via email.  This is your opportunity to tell us what excellent service you received, and where we could use improvement.  We value your input!     Thank you again for visiting [facility].          Toe Sprain  A sprain is a stretching or tearing of the ligaments that hold a joint together. There are no broken bones. Sprains generally take from 3-6 weeks to heal. A toe sprain may be treated by taping the injured toe to the next toe. This is called génesis taping. This protects the injured toe and holds it in position. Mild sprains may not need any additional support. If the toenail has been hurt badly, it may fall off in 1-2 weeks. A new one will usually start to grow back within a month.    Home care  · Keep 
your leg elevated when sitting or lying down. This is very important during the first 48 hours to reduce swelling. Stay off the injured foot as much as possible until you can walk on it without pain. If needed, you may use crutches during the first week for this purpose. Crutches can be rented at many pharmacies or surgical/orthopedic supply stores.  · You may be given a cast shoe to wear to prevent movement in your toe. If not, you can use a sandal or any shoe that does not put pressure on the injured toe until the swelling and pain go away. If using a sandal, be careful not to hit your foot against anything, since another injury could make the sprain worse.  · Apply an ice pack over the injured area for 15 to 20 minutes every 3 to 6 hours. You should do this for the first 24 to 48 hours. You can make an ice pack by filling a plastic bag that seals at the top with ice cubes and then wrapping it with a thin towel. Continue to use ice packs for relief of pain and swelling as needed. As the ice melts, be careful to avoid getting your wrap, splint, or cast wet. As the ice melts, be careful to avoid getting any wrap, splint, tape, or cast wet. After 48 hours, apply heat from a warm shower or bath for 20 minutes several times daily. Alternating ice and heat may also be helpful.  · If buddy tape was applied and it becomes wet or dirty, change it. You may replace it with paper, plastic or cloth tape. Cloth tape and paper tapes must be kept dry. Apply gauze or cotton padding between the toes, especially near webbed area. This will help prevent the skin from getting moist and breaking down. Keep the buddy tape in place for at least 4 weeks, or as advised by your healthcare provider.  · You may use over-the-counter pain medicine to control pain, unless another pain medicine was prescribed. If you have chronic liver or kidney disease or ever had a stomach ulcer or GI bleeding, talk with your healthcare provider before using 
these medicines.  · You may return to sports after healing, when you can run without pain.  Follow-up care  Follow up with your with your healthcare provider as advised. Sometimes fractures don’t show up on the first X-ray. Bruises and sprains can sometimes hurt as much as a fracture. These injuries can take time to heal completely. If your symptoms don’t improve or they get worse, talk with your healthcare provider. You may need a repeat X-ray. If X-rays were taken, you will be told of any new findings that may affect your care.  When to seek medical advice  Call your healthcare provider right away if any of these occur:  · Redness, warmth, or fluid drainage from your toe  · Pain or swelling increases  · Toes become cold, blue, numb, or tingly  © 9517-4870 [facility]. [address] [place] [address], [place] [address]. All rights reserved. This information is not intended as a substitute for professional medical care. Always follow your healthcare professional's instructions.           
As above.    Impression:    Pt with significant dementia and history of antrectomy/bilroth 2 for gastric ulcer, has report of fever as outpatient but none documented as inpatient, resolved abdominal pain, improving abnormal LFTs, and MRI/MRCP demonstrating choledocholithiasis    This clinical picture is consistent with choledocholithiasis with resolved mild acute cholangitis.    Recommendations:    #1.  ERCP on 2/4/2020 if patient's family/health care proxy agrees.  Note that altered anatomy ERCP has decreased but reasonable chance of success, and increased but reasonable risk of complications compared to standard ERCP.      #2.  The DNR/DNI will need to be suspended periprocedurally.    #3.  NPO after MN, may have meds with sips of water.    #4.  IV antibiotics.
Hepatology Staff: Toni Acevedo MD    I saw and examined the patient along with  Dr. Douglas  02-02-20.    Patient Medical Record, hospital course was reviewed as noted above. In summary, 89 yrs old F admitted with fever, and elevated LFT, Hx of CCx. Suspect mild cholangitis- improving on IV Zosyn.    Vitals: Vital Signs Last 24 Hrs  T(C): 36.6 (02 Feb 2020 05:12), Max: 36.9 (01 Feb 2020 19:36)  T(F): 97.9 (02 Feb 2020 05:12), Max: 98.5 (01 Feb 2020 19:36)  HR: 76 (02 Feb 2020 05:12) (63 - 76)  BP: 109/60 (02 Feb 2020 05:12) (109/60 - 158/84)    RR: 17 (02 Feb 2020 05:12) (17 - 18)  SpO2: 94% (02 Feb 2020 05:12) (94% - 96%)    Labs:Creatinine, Serum: 1.00 mg/dL (02-02-20 @ 06:44)  Bilirubin Total, Serum: 0.9 mg/dL (02-02-20 @ 06:44)  Creatinine, Serum: 0.98 mg/dL (02-02-20 @ 06:44)  Bilirubin Total, Serum: 0.8 mg/dL (02-02-20 @ 06:44)  INR: 0.97 ratio (02-01-20 @ 22:37)  Creatinine, Serum: 1.11 mg/dL (02-01-20 @ 19:20)  Bilirubin Total, Serum: 0.8 mg/dL (02-01-20 @ 19:20)      Imaging Studies:  I/O: I&O's Summary    01 Feb 2020 07:01  -  02 Feb 2020 07:00  --------------------------------------------------------  IN: 1020 mL / OUT: 1000 mL / NET: 20 mL    Nutritional Status:   Albumin, Serum: 3.3 g/dL (02-02-20 @ 06:44)  Albumin, Serum: 3.3 g/dL (02-02-20 @ 06:44)  Albumin, Serum: 3.4 g/dL (02-01-20 @ 19:20)    Recommendations: Cont with IV Zosyn. Agree with MRCP to r/o choledocholithiasis.    Plan discussed with Primary team.

## 2020-02-03 NOTE — CONSULT NOTE ADULT - ASSESSMENT
89 year old man with dementia (A&Ox1), h/o acute cholecystitis s/p cholecystectomy, h/o of gallstone pancreatitis, BPH s/p TURP, billroth 2 [facility] fever with elevated liver enzymes. Advanced GI consulted for concern for CBD stones on MRCP.    1)CBD stones  Patient presenting with elevated LFTS, T bili of 1.4, now downtrending. MRCP showing CBD of 9mm and possible choledocholithiasis. Reported fevers but none recorded in chart since admission and WBC of 11 but now normalized. Currently patient is hemodynamically stable.   Dx: mild acute cholangitis vs. choledocholithaisis  2) Gastrojejenostomy (Billroth 2)  3) DNR/DNI    - tentative plan for ERCP+/- EUS  tomorrow if family agreeable  - please keep NPO after midnight  - trend LFTs and CBC daily  - continue IV abx  - rest of plan as per primary team 89 year old man with dementia (A&Ox1), h/o acute cholecystitis s/p cholecystectomy, h/o of gallstone pancreatitis, BPH s/p TURP, billroth 2 [facility]  for evaluation of fever with elevated liver enzymes. Advanced GI consulted for concern for CBD stones on MRCP.    1)CBD stones  Patient presenting with elevated LFTS, T bili of 1.4, now downtrending. MRCP showing CBD of 9mm and possible choledocholithiasis. Reported fevers but none recorded in chart since admission and WBC of 11 but now normalized. Currently patient is hemodynamically stable.   Mild acute cholangitis  choledocholithaisis  2) Gastrojejenostomy (Billroth 2)  3) DNR/DNI    - tentative plan for ERCP if family agreeable  - please keep NPO after midnight  - trend LFTs and CBC daily  - continue IV abx  - rest of plan as per primary team

## 2020-02-03 NOTE — CONSULT NOTE ADULT - SUBJECTIVE AND OBJECTIVE BOX
Chief Complaint:  Patient is a 89y old  Male who presents with a chief complaint of 89F w/ fever and elevated liver enzymes (02 Feb 2020 12:07)      HPI:    89 year old man with dementia (A&Ox1), h/o acute cholecystitis s/p cholecystectomy, h/o of gallstone pancreatitis, BPH s/p TURP, billroth II [facility]  for [facility] of fever with elevated liver enzymes. Advanced GI consulted for concern for CBD stones on MRCP.    Per chart, patient developed abdominal pain and reported fevers at [facility] which did not improve with several days/course of ciprofloxacin. Patient was reportedly sent to hospital for associated elevated liver enzymes which did not improve. In ED, patient remained afebrile with mild leukocytosis. In additional to liver enzymes, total bilirubin and alkaline phosphatase remained normal. CT abdomen/pelvis was ordered and did not reveal any acute findings. Gastroenterology was consulted overnight due to concern for cholangitis. At bedside, patient denies abdominal pain, fever, or vomiting.     Allergies:  No Known Allergies      Home Medications:    Hospital Medications:  aspirin enteric coated 81 milliGRAM(s) Oral daily  bethanechol 10 milliGRAM(s) Oral three times a day  finasteride 5 milliGRAM(s) Oral daily  haloperidol    Injectable 2.5 milliGRAM(s) IV Push every 6 hours PRN  heparin  Injectable 5000 Unit(s) SubCutaneous every 8 hours  metoprolol tartrate 25 milliGRAM(s) Oral two times a day  piperacillin/tazobactam IVPB.. 3.375 Gram(s) IV Intermittent every 8 hours  tamsulosin 0.4 milliGRAM(s) Oral at bedtime      PMHX/PSHX:  Acute cholecystitis  Acute Pancreatitis  Diabetes Mellitus Type II  Age Related Macular Degeneration  Essential (Primary) Hypertension  S/P cholecystectomy  S/P Gastrectomy  Injury of Superficial Femoral Artery  S/P Tonsillectomy  S/P Exploratory Laparotomy  Essential (Primary) Hypertension      Family history:  No pertinent family history in first degree relatives      Social History:     ROS:     General:  No wt loss, fevers, chills, night sweats, fatigue,   Eyes:  Good vision, no reported pain  ENT:  No sore throat, pain, runny nose, dysphagia  CV:  No pain, palpitations, hypo/hypertension  Resp:  No dyspnea, cough, tachypnea, wheezing  GI:  See HPI  :  No pain, bleeding, incontinence, nocturia  Muscle:  No pain, weakness  Neuro:  No weakness, tingling, memory problems  Psych:  No fatigue, insomnia, mood problems, depression  Endocrine:  No polyuria, polydipsia, cold/heat intolerance  Heme:  No petechiae, ecchymosis, easy bruisability  Skin:  No rash, edema      PHYSICAL EXAM:     GENERAL:  NAD  HEENT:  sclera anicteric  CHEST:  Full & symmetric excursion  HEART:  Regular rhythm, S1, S2  ABDOMEN:  +RUQ abdominal pain, soft  EXTREMITIES:  no cyanosis,clubbing or edema  SKIN:  No rash/erythema/ecchymoses/petechiae/wounds/abscess/warm/dry  NEURO:  awake    Vital Signs:  Vital Signs Last 24 Hrs  T(C): 36.4 (03 Feb 2020 04:09), Max: 36.5 (02 Feb 2020 21:14)  T(F): 97.5 (03 Feb 2020 04:09), Max: 97.7 (02 Feb 2020 21:14)  HR: 70 (03 Feb 2020 04:09) (60 - 70)  BP: 130/73 (03 Feb 2020 04:09) (125/57 - 144/69)  BP(mean): --  RR: 18 (03 Feb 2020 04:09) (17 - 18)  SpO2: 94% (03 Feb 2020 04:09) (93% - 94%)  Daily     Daily     LABS:                        13.1   8.21  )-----------( 176      ( 03 Feb 2020 06:05 )             42.5     02-03    138  |  102  |  18  ----------------------------<  140<H>  4.0   |  22  |  1.16    Ca    10.1      03 Feb 2020 06:05  Phos  3.1     02-02  Mg     1.8     02-02    TPro  6.5  /  Alb  3.4  /  TBili  0.6  /  DBili  x   /  AST  31  /  ALT  125<H>  /  AlkPhos  147<H>  02-03    LIVER FUNCTIONS - ( 03 Feb 2020 06:05 )  Alb: 3.4 g/dL / Pro: 6.5 g/dL / ALK PHOS: 147 U/L / ALT: 125 U/L / AST: 31 U/L / GGT: x           PT/INR - ( 02 Feb 2020 09:05 )   PT: 10.5 sec;   INR: 0.92 ratio         PTT - ( 01 Feb 2020 22:37 )  PTT:30.9 sec        Imaging:

## 2020-02-03 NOTE — PROGRESS NOTE ADULT - PROBLEM SELECTOR PLAN 1
MRCP with + stone in distal CBD  ? if patient presented with a component of mild cholangitis given reported fever  2/1 Bcx negative  appreciate GI input--> will keep NPO p MN for possible EUS/ERCP if family agreeable. unclear why patient is on aspirin, will hold for now.  continue with IV zosyn empirically for now

## 2020-02-03 NOTE — PROGRESS NOTE ADULT - PROBLEM SELECTOR PLAN 3
see on CT a/p  UA/Ucx negative  renal U/S with b/l cysts noted see on CT a/p  UA/Ucx negative  renal U/S with b/l cysts noted  patient urinating without difficulty

## 2020-02-04 LAB
ALBUMIN SERPL ELPH-MCNC: 3.6 G/DL — SIGNIFICANT CHANGE UP (ref 3.3–5)
ALP SERPL-CCNC: 132 U/L — HIGH (ref 40–120)
ALT FLD-CCNC: 107 U/L — HIGH (ref 10–45)
ANION GAP SERPL CALC-SCNC: 11 MMOL/L — SIGNIFICANT CHANGE UP (ref 5–17)
AST SERPL-CCNC: 41 U/L — HIGH (ref 10–40)
BILIRUB SERPL-MCNC: 0.5 MG/DL — SIGNIFICANT CHANGE UP (ref 0.2–1.2)
BUN SERPL-MCNC: 24 MG/DL — HIGH (ref 7–23)
CALCIUM SERPL-MCNC: 10.1 MG/DL — SIGNIFICANT CHANGE UP (ref 8.4–10.5)
CHLORIDE SERPL-SCNC: 102 MMOL/L — SIGNIFICANT CHANGE UP (ref 96–108)
CO2 SERPL-SCNC: 24 MMOL/L — SIGNIFICANT CHANGE UP (ref 22–31)
CREAT SERPL-MCNC: 1.13 MG/DL — SIGNIFICANT CHANGE UP (ref 0.5–1.3)
GLUCOSE SERPL-MCNC: 149 MG/DL — HIGH (ref 70–99)
HCT VFR BLD CALC: 43.5 % — SIGNIFICANT CHANGE UP (ref 39–50)
HGB BLD-MCNC: 13.4 G/DL — SIGNIFICANT CHANGE UP (ref 13–17)
MCHC RBC-ENTMCNC: 25.7 PG — LOW (ref 27–34)
MCHC RBC-ENTMCNC: 30.8 GM/DL — LOW (ref 32–36)
MCV RBC AUTO: 83.3 FL — SIGNIFICANT CHANGE UP (ref 80–100)
NRBC # BLD: 0 /100 WBCS — SIGNIFICANT CHANGE UP (ref 0–0)
PLATELET # BLD AUTO: 171 K/UL — SIGNIFICANT CHANGE UP (ref 150–400)
POTASSIUM SERPL-MCNC: 4.1 MMOL/L — SIGNIFICANT CHANGE UP (ref 3.5–5.3)
POTASSIUM SERPL-SCNC: 4.1 MMOL/L — SIGNIFICANT CHANGE UP (ref 3.5–5.3)
PROT SERPL-MCNC: 6.9 G/DL — SIGNIFICANT CHANGE UP (ref 6–8.3)
RBC # BLD: 5.22 M/UL — SIGNIFICANT CHANGE UP (ref 4.2–5.8)
RBC # FLD: 14.5 % — SIGNIFICANT CHANGE UP (ref 10.3–14.5)
SODIUM SERPL-SCNC: 137 MMOL/L — SIGNIFICANT CHANGE UP (ref 135–145)
WBC # BLD: 7.8 K/UL — SIGNIFICANT CHANGE UP (ref 3.8–10.5)
WBC # FLD AUTO: 7.8 K/UL — SIGNIFICANT CHANGE UP (ref 3.8–10.5)

## 2020-02-04 PROCEDURE — 43262 ENDO CHOLANGIOPANCREATOGRAPH: CPT | Mod: GC

## 2020-02-04 PROCEDURE — 43264 ERCP REMOVE DUCT CALCULI: CPT | Mod: GC

## 2020-02-04 PROCEDURE — 74328 X-RAY BILE DUCT ENDOSCOPY: CPT | Mod: 26,GC

## 2020-02-04 PROCEDURE — 99233 SBSQ HOSP IP/OBS HIGH 50: CPT

## 2020-02-04 RX ADMIN — FINASTERIDE 5 MILLIGRAM(S): 5 TABLET, FILM COATED ORAL at 11:34

## 2020-02-04 RX ADMIN — PIPERACILLIN AND TAZOBACTAM 25 GRAM(S): 4; .5 INJECTION, POWDER, LYOPHILIZED, FOR SOLUTION INTRAVENOUS at 06:37

## 2020-02-04 RX ADMIN — PIPERACILLIN AND TAZOBACTAM 25 GRAM(S): 4; .5 INJECTION, POWDER, LYOPHILIZED, FOR SOLUTION INTRAVENOUS at 17:04

## 2020-02-04 RX ADMIN — SODIUM CHLORIDE 125 MILLILITER(S): 9 INJECTION, SOLUTION INTRAVENOUS at 06:37

## 2020-02-04 RX ADMIN — Medication 25 MILLIGRAM(S): at 17:05

## 2020-02-04 RX ADMIN — TAMSULOSIN HYDROCHLORIDE 0.4 MILLIGRAM(S): 0.4 CAPSULE ORAL at 22:09

## 2020-02-04 RX ADMIN — Medication 10 MILLIGRAM(S): at 22:09

## 2020-02-04 RX ADMIN — HEPARIN SODIUM 5000 UNIT(S): 5000 INJECTION INTRAVENOUS; SUBCUTANEOUS at 22:09

## 2020-02-04 NOTE — PHYSICAL THERAPY INITIAL EVALUATION ADULT - PERTINENT HX OF CURRENT PROBLEM, REHAB EVAL
Pt is a 89M w/ dementia (A&Ox1), reported hx of acute cholecystitis s/p cholecystectomy, gallstone pancreatitis, gastrojejunostomy, BPH s/p TURP, HTN, DM2 presented from SNF for evaluation of fever with transaminitis. Patient found to have distal choledocholithiasis on MRCP.

## 2020-02-04 NOTE — PROGRESS NOTE ADULT - PROBLEM SELECTOR PLAN 1
MRCP with + stone in distal CBD  ? if patient presented with a component of mild cholangitis given reported fever  2/1 Bcx negative  appreciate GI input--> plan for EUS/ERCP today   aspirin, will hold for now.  continue with IV zosyn empirically for now

## 2020-02-04 NOTE — PROGRESS NOTE ADULT - PROBLEM SELECTOR PLAN 3
see on CT a/p  UA/Ucx negative  renal U/S with b/l cysts noted  patient urinating without difficulty

## 2020-02-04 NOTE — PROGRESS NOTE ADULT - PROBLEM SELECTOR PLAN 8
Transitions of Care Status:  1.  Name of PCP: [facility]  2.  PCP Contacted on Admission: [ ] Y    [X ] N    3.  PCP contacted at Discharge: [ ] Y    [ ] N    [ ] N/A  4.  Post-Discharge Appointment Date and Location:  5.  Summary of Handoff given to PCP:

## 2020-02-04 NOTE — PROGRESS NOTE ADULT - SUBJECTIVE AND OBJECTIVE BOX
[facility] Division of Hospital Medicine  Qian Hunt MD  Pager (M-F, 8A-5P): [phone]  Other Times:  [phone]    Patient is a 89y old  Male who presents with a chief complaint of 89F w/ fever and elevated liver enzymes (03 Feb 2020 11:45)      SUBJECTIVE / OVERNIGHT EVENTS: no acute events  ADDITIONAL REVIEW OF SYSTEMS: patient denies any ab pain.     MEDICATIONS  (STANDING):  bethanechol 10 milliGRAM(s) Oral three times a day  finasteride 5 milliGRAM(s) Oral daily  heparin  Injectable 5000 Unit(s) SubCutaneous every 8 hours  lactated ringers. 1000 milliLiter(s) (125 mL/Hr) IV Continuous <Continuous>  metoprolol tartrate 25 milliGRAM(s) Oral two times a day  piperacillin/tazobactam IVPB.. 3.375 Gram(s) IV Intermittent every 8 hours  tamsulosin 0.4 milliGRAM(s) Oral at bedtime    MEDICATIONS  (PRN):  haloperidol    Injectable 2.5 milliGRAM(s) IV Push every 6 hours PRN severe agitation      CAPILLARY BLOOD GLUCOSE        I&O's Summary    03 Feb 2020 07:01  -  04 Feb 2020 07:00  --------------------------------------------------------  IN: 480 mL / OUT: 0 mL / NET: 480 mL        PHYSICAL EXAM:  Vital Signs Last 24 Hrs  T(C): 36.8 (04 Feb 2020 07:01), Max: 36.8 (04 Feb 2020 07:01)  T(F): 98.2 (04 Feb 2020 07:01), Max: 98.2 (04 Feb 2020 07:01)  HR: 67 (04 Feb 2020 07:01) (53 - 67)  BP: 120/79 (04 Feb 2020 07:01) (112/50 - 133/70)  BP(mean): --  RR: 18 (04 Feb 2020 07:01) (17 - 18)  SpO2: 92% (04 Feb 2020 07:01) (92% - 95%)    CONSTITUTIONAL: NAD, well-developed, well-groomed  NECK: Supple, no palpable masses; no thyromegaly  RESPIRATORY: Normal respiratory effort; lungs are clear to auscultation bilaterally  CARDIOVASCULAR: normal S1 and S2; No lower extremity edema  ABDOMEN: Nontender to palpation, normoactive bowel sounds, no rebound/guarding; No hepatosplenomegaly  MUSCULOSKELETAL:  no clubbing or cyanosis of digits; no joint swelling or tenderness to palpation  PSYCH: A+O to person, place(in Panamanian); affect appropriate, calm  SKIN: No rashes; no palpable lesions    LABS:                        13.4   7.80  )-----------( 171      ( 04 Feb 2020 07:08 )             43.5     02-04    137  |  102  |  24<H>  ----------------------------<  149<H>  4.1   |  24  |  1.13    Ca    10.1      04 Feb 2020 07:06    TPro  6.9  /  Alb  3.6  /  TBili  0.5  /  DBili  x   /  AST  41<H>  /  ALT  107<H>  /  AlkPhos  132<H>  02-04    PT/INR - ( 02 Feb 2020 09:05 )   PT: 10.5 sec;   INR: 0.92 ratio                   Culture - Urine (collected 01 Feb 2020 10:31)  Source: .Urine Clean Catch (Midstream)  Final Report (02 Feb 2020 11:00):    No growth        RADIOLOGY & ADDITIONAL TESTS:  Results Reviewed:   Imaging Personally Reviewed:  Electrocardiogram Personally Reviewed:    COORDINATION OF CARE:  Care Discussed with Consultants/Other Providers [Y/N]: medicine NP  Prior or Outpatient Records Reviewed [Y/N]:

## 2020-02-04 NOTE — PHYSICAL THERAPY INITIAL EVALUATION ADULT - PLANNED THERAPY INTERVENTIONS, PT EVAL
GOAL: Pt will perform 12 stairs with or without U HR as needed within 3-4weeks./balance training/transfer training/bed mobility training/gait training

## 2020-02-04 NOTE — PROGRESS NOTE ADULT - SUBJECTIVE AND OBJECTIVE BOX
Pre-Endoscopy Evaluation      Referring Physician:  dr. jorden ferro                                Procedure: ercp    Indication for Procedure: cbd stone    Pertinent History: 89 year old male with PMH  dementia (A&Ox1), h/o acute cholecystitis s/p cholecystectomy, h/o of gallstone pancreatitis, BPH s/p TURP, billroth 2 [facility]  [facility] of fever with elevated liver enzymes; concern for CBD stones on MRCP      PAST MEDICAL & SURGICAL HISTORY:  Acute cholecystitis  Acute Pancreatitis  Diabetes Mellitus Type II  Age Related Macular Degeneration  Essential (Primary) Hypertension  S/P cholecystectomy  S/P Gastrectomy  Injury of Superficial Femoral Artery: repaired  S/P Tonsillectomy  S/P Exploratory Laparotomy: reportedly for gastric ulcer, unknown procedure  Essential (Primary) Hypertension      PMH of Gastroparesis [ ]  Gastric Surgery [ ]  Gastric Outlet Obstruction [ ]    Allergies:    No Known Allergies    Intolerances:      Latex allergy: [ ] yes [x] no    Medications:MEDICATIONS  (STANDING):  bethanechol 10 milliGRAM(s) Oral three times a day  finasteride 5 milliGRAM(s) Oral daily  heparin  Injectable 5000 Unit(s) SubCutaneous every 8 hours  lactated ringers. 1000 milliLiter(s) (125 mL/Hr) IV Continuous <Continuous>  metoprolol tartrate 25 milliGRAM(s) Oral two times a day  piperacillin/tazobactam IVPB.. 3.375 Gram(s) IV Intermittent every 8 hours  tamsulosin 0.4 milliGRAM(s) Oral at bedtime    MEDICATIONS  (PRN):  haloperidol    Injectable 2.5 milliGRAM(s) IV Push every 6 hours PRN severe agitation      CAPILLARY BLOOD GLUCOSE      Smoking: [ ] yes  [X] no    AICD/PPM: [ ] yes   [X] no    Pertinent lab data:                        13.4   7.80  )-----------( 171      ( 04 Feb 2020 07:08 )             43.5     02-04    137  |  102  |  24<H>  ----------------------------<  149<H>  4.1   |  24  |  1.13    Ca    10.1      04 Feb 2020 07:06    TPro  6.9  /  Alb  3.6  /  TBili  0.5  /  DBili  x   /  AST  41<H>  /  ALT  107<H>  /  AlkPhos  132<H>  02-04          Physical Examination:    Daily   Vital Signs Last 24 Hrs  T(C): 36.6 (04 Feb 2020 12:22), Max: 36.8 (04 Feb 2020 07:01)  T(F): 97.9 (04 Feb 2020 12:22), Max: 98.2 (04 Feb 2020 07:01)  HR: 64 (04 Feb 2020 12:21) (53 - 78)  BP: 148/76 (04 Feb 2020 12:22) (112/50 - 148/76)  BP(mean): --  RR: 18 (04 Feb 2020 12:22) (17 - 18)  SpO2: 93% (04 Feb 2020 12:22) (92% - 95%)    Drug Dosing Weight  Height (cm): 165.1 (01 Feb 2020 04:58)  Weight (kg): 85.5 (01 Feb 2020 04:58)  BMI (kg/m2): 31.4 (01 Feb 2020 04:58)  BSA (m2): 1.93 (01 Feb 2020 04:58)    Constitutional: NAD     Neck:  No JVD    Respiratory: CTAB/L    Cardiovascular: S1 and S2    Gastrointestinal: BS+, soft, NT/ND    Extremities: No peripheral edema    Neurological: Awake, alert    : No Marinelli    Skin: No rashes    Comments:    The patient is a suitable candidate for the planned procedure unless box checked [ ]  No, explain:

## 2020-02-05 DIAGNOSIS — Z29.9 ENCOUNTER FOR PROPHYLACTIC MEASURES, UNSPECIFIED: ICD-10-CM

## 2020-02-05 LAB
ALBUMIN SERPL ELPH-MCNC: 3.3 G/DL — SIGNIFICANT CHANGE UP (ref 3.3–5)
ALBUMIN SERPL ELPH-MCNC: 3.6 G/DL — SIGNIFICANT CHANGE UP (ref 3.3–5)
ALP SERPL-CCNC: 231 U/L — HIGH (ref 40–120)
ALP SERPL-CCNC: 287 U/L — HIGH (ref 40–120)
ALT FLD-CCNC: 192 U/L — HIGH (ref 10–45)
ALT FLD-CCNC: 296 U/L — HIGH (ref 10–45)
ANION GAP SERPL CALC-SCNC: 10 MMOL/L — SIGNIFICANT CHANGE UP (ref 5–17)
ANION GAP SERPL CALC-SCNC: 14 MMOL/L — SIGNIFICANT CHANGE UP (ref 5–17)
AST SERPL-CCNC: 198 U/L — HIGH (ref 10–40)
AST SERPL-CCNC: 279 U/L — HIGH (ref 10–40)
BILIRUB SERPL-MCNC: 2.3 MG/DL — HIGH (ref 0.2–1.2)
BILIRUB SERPL-MCNC: 2.6 MG/DL — HIGH (ref 0.2–1.2)
BUN SERPL-MCNC: 22 MG/DL — SIGNIFICANT CHANGE UP (ref 7–23)
BUN SERPL-MCNC: 23 MG/DL — SIGNIFICANT CHANGE UP (ref 7–23)
CALCIUM SERPL-MCNC: 10.4 MG/DL — SIGNIFICANT CHANGE UP (ref 8.4–10.5)
CALCIUM SERPL-MCNC: 9.7 MG/DL — SIGNIFICANT CHANGE UP (ref 8.4–10.5)
CHLORIDE SERPL-SCNC: 100 MMOL/L — SIGNIFICANT CHANGE UP (ref 96–108)
CHLORIDE SERPL-SCNC: 102 MMOL/L — SIGNIFICANT CHANGE UP (ref 96–108)
CO2 SERPL-SCNC: 23 MMOL/L — SIGNIFICANT CHANGE UP (ref 22–31)
CO2 SERPL-SCNC: 24 MMOL/L — SIGNIFICANT CHANGE UP (ref 22–31)
CREAT SERPL-MCNC: 1.3 MG/DL — SIGNIFICANT CHANGE UP (ref 0.5–1.3)
CREAT SERPL-MCNC: 1.39 MG/DL — HIGH (ref 0.5–1.3)
GLUCOSE SERPL-MCNC: 126 MG/DL — HIGH (ref 70–99)
GLUCOSE SERPL-MCNC: 167 MG/DL — HIGH (ref 70–99)
POTASSIUM SERPL-MCNC: 4 MMOL/L — SIGNIFICANT CHANGE UP (ref 3.5–5.3)
POTASSIUM SERPL-MCNC: 4.3 MMOL/L — SIGNIFICANT CHANGE UP (ref 3.5–5.3)
POTASSIUM SERPL-SCNC: 4 MMOL/L — SIGNIFICANT CHANGE UP (ref 3.5–5.3)
POTASSIUM SERPL-SCNC: 4.3 MMOL/L — SIGNIFICANT CHANGE UP (ref 3.5–5.3)
PROT SERPL-MCNC: 6 G/DL — SIGNIFICANT CHANGE UP (ref 6–8.3)
PROT SERPL-MCNC: 6.6 G/DL — SIGNIFICANT CHANGE UP (ref 6–8.3)
SODIUM SERPL-SCNC: 134 MMOL/L — LOW (ref 135–145)
SODIUM SERPL-SCNC: 139 MMOL/L — SIGNIFICANT CHANGE UP (ref 135–145)

## 2020-02-05 PROCEDURE — 99232 SBSQ HOSP IP/OBS MODERATE 35: CPT | Mod: GC

## 2020-02-05 PROCEDURE — 99232 SBSQ HOSP IP/OBS MODERATE 35: CPT

## 2020-02-05 RX ORDER — IPRATROPIUM/ALBUTEROL SULFATE 18-103MCG
3 AEROSOL WITH ADAPTER (GRAM) INHALATION EVERY 8 HOURS
Refills: 0 | Status: DISCONTINUED | OUTPATIENT
Start: 2020-02-05 | End: 2020-02-07

## 2020-02-05 RX ORDER — SODIUM CHLORIDE 9 MG/ML
500 INJECTION, SOLUTION INTRAVENOUS
Refills: 0 | Status: DISCONTINUED | OUTPATIENT
Start: 2020-02-05 | End: 2020-02-07

## 2020-02-05 RX ADMIN — Medication 10 MILLIGRAM(S): at 21:42

## 2020-02-05 RX ADMIN — PIPERACILLIN AND TAZOBACTAM 25 GRAM(S): 4; .5 INJECTION, POWDER, LYOPHILIZED, FOR SOLUTION INTRAVENOUS at 08:58

## 2020-02-05 RX ADMIN — HEPARIN SODIUM 5000 UNIT(S): 5000 INJECTION INTRAVENOUS; SUBCUTANEOUS at 21:42

## 2020-02-05 RX ADMIN — TAMSULOSIN HYDROCHLORIDE 0.4 MILLIGRAM(S): 0.4 CAPSULE ORAL at 21:42

## 2020-02-05 RX ADMIN — PIPERACILLIN AND TAZOBACTAM 25 GRAM(S): 4; .5 INJECTION, POWDER, LYOPHILIZED, FOR SOLUTION INTRAVENOUS at 17:58

## 2020-02-05 RX ADMIN — HEPARIN SODIUM 5000 UNIT(S): 5000 INJECTION INTRAVENOUS; SUBCUTANEOUS at 13:14

## 2020-02-05 RX ADMIN — Medication 25 MILLIGRAM(S): at 17:59

## 2020-02-05 RX ADMIN — Medication 25 MILLIGRAM(S): at 06:13

## 2020-02-05 RX ADMIN — FINASTERIDE 5 MILLIGRAM(S): 5 TABLET, FILM COATED ORAL at 11:02

## 2020-02-05 RX ADMIN — Medication 3 MILLILITER(S): at 21:43

## 2020-02-05 RX ADMIN — HEPARIN SODIUM 5000 UNIT(S): 5000 INJECTION INTRAVENOUS; SUBCUTANEOUS at 06:13

## 2020-02-05 RX ADMIN — PIPERACILLIN AND TAZOBACTAM 25 GRAM(S): 4; .5 INJECTION, POWDER, LYOPHILIZED, FOR SOLUTION INTRAVENOUS at 01:04

## 2020-02-05 RX ADMIN — Medication 10 MILLIGRAM(S): at 13:14

## 2020-02-05 RX ADMIN — SODIUM CHLORIDE 50 MILLILITER(S): 9 INJECTION, SOLUTION INTRAVENOUS at 20:02

## 2020-02-05 RX ADMIN — Medication 10 MILLIGRAM(S): at 06:13

## 2020-02-05 NOTE — PROGRESS NOTE ADULT - PROBLEM SELECTOR PLAN 1
MRCP with + stone in distal CBD  ? if patient presented with a component of mild cholangitis given reported fever  2/1 Bcx negative  s/p ERCP 2/4 with removal of sludge and stone  can likely complete 5 day course of IV zosyn 1/31-

## 2020-02-05 NOTE — PROGRESS NOTE ADULT - ASSESSMENT
89M w/ dementia (A&Ox1-2), reported hx of acute cholecystitis s/p cholecystectomy, gallstone pancreatitis, gastrojejunostomy, BPH s/p TURP, HTN, DM2 presented from SNF for evaluation of fever with transaminitis. Patient found to have distal choledocholithiasis on MRCP and suspected mild cholangitis s/p ERCP with removal of sludge and stone on 2/4.

## 2020-02-05 NOTE — PROGRESS NOTE ADULT - ASSESSMENT
89 year old man with dementia (A&Ox1), h/o acute cholecystitis s/p cholecystectomy, h/o of gallstone pancreatitis, BPH s/p TURP, billroth 2 [facility] fever with elevated liver enzymes. Advanced GI consulted for concern for CBD stones on MRCP.    1)CBD stones  Patient presenting with elevated LFTS, T bili of 1.4, now downtrending. MRCP showing CBD of 9mm and possible choledocholithiasis. Reported fevers but none recorded in chart since admission and WBC of 11 but now normalized. Currently patient is hemodynamically stable.   Mild acute cholangitis  choledocholithaisis  s/p ERCP yesterday with stone and sludge removal  2) Gastrojejenostomy (Billroth 2)  3) DNR/DNI    - trend LFTs and CBC daily  - continue IV abx  - rest of plan as per primary team

## 2020-02-05 NOTE — PROGRESS NOTE ADULT - SUBJECTIVE AND OBJECTIVE BOX
Chief Complaint:  Patient is a 89y old  Male who presents with a chief complaint of 89F w/ fever and elevated liver enzymes (04 Feb 2020 13:50)      Interval Events:   s/p ERCP with stone and sludge removal yesterday    Allergies:  No Known Allergies      Home Medications:    Hospital Medications:  bethanechol 10 milliGRAM(s) Oral three times a day  finasteride 5 milliGRAM(s) Oral daily  haloperidol    Injectable 2.5 milliGRAM(s) IV Push every 6 hours PRN  heparin  Injectable 5000 Unit(s) SubCutaneous every 8 hours  metoprolol tartrate 25 milliGRAM(s) Oral two times a day  piperacillin/tazobactam IVPB.. 3.375 Gram(s) IV Intermittent every 8 hours  tamsulosin 0.4 milliGRAM(s) Oral at bedtime      PMHX/PSHX:  Acute cholecystitis  Acute Pancreatitis  Diabetes Mellitus Type II  Age Related Macular Degeneration  Essential (Primary) Hypertension  S/P cholecystectomy  S/P Gastrectomy  Injury of Superficial Femoral Artery  S/P Tonsillectomy  S/P Exploratory Laparotomy  Essential (Primary) Hypertension      Family history:  No pertinent family history in first degree relatives      ROS:     General:  No wt loss, fevers, chills, night sweats, fatigue,   Eyes:  Good vision, no reported pain  ENT:  No sore throat, pain, runny nose, dysphagia  CV:  No pain, palpitations, hypo/hypertension  Resp:  No dyspnea, cough, tachypnea, wheezing  GI:  No pain, No nausea, No vomiting, No diarrhea, No constipation, No weight loss, No fever, No pruritis, No rectal bleeding, No tarry stools, No dysphagia,  :  No pain, bleeding, incontinence, nocturia  Muscle:  No pain, weakness  Neuro:  No weakness, tingling, memory problems  Psych:  No fatigue, insomnia, mood problems, depression  Endocrine:  No polyuria, polydipsia, cold/heat intolerance  Heme:  No petechiae, ecchymosis, easy bruisability  Skin:  No rash, tattoos, scars, edema      PHYSICAL EXAM:   Vital Signs:  Vital Signs Last 24 Hrs  T(C): 36.5 (05 Feb 2020 04:13), Max: 36.8 (04 Feb 2020 12:21)  T(F): 97.7 (05 Feb 2020 04:13), Max: 98.2 (04 Feb 2020 12:21)  HR: 70 (05 Feb 2020 04:13) (51 - 78)  BP: 122/76 (05 Feb 2020 04:13) (122/76 - 148/76)  BP(mean): --  RR: 18 (05 Feb 2020 04:13) (18 - 18)  SpO2: 92% (05 Feb 2020 04:13) (91% - 93%)  Daily     Daily     GENERAL:  NAD  HEENT:  sclera anicteric  CHEST:  Full & symmetric excursion  HEART:  Regular rhythm, S1, S2  ABDOMEN:  +RUQ abdominal pain, soft  EXTREMITIES:  no cyanosis,clubbing or edema  SKIN:  No rash/erythema/ecchymoses/petechiae/wounds/abscess/warm/dry  NEURO:  awake    LABS:                        13.4   7.80  )-----------( 171      ( 04 Feb 2020 07:08 )             43.5     02-05    139  |  102  |  23  ----------------------------<  126<H>  4.0   |  23  |  1.30    Ca    9.7      05 Feb 2020 06:02    TPro  6.0  /  Alb  3.3  /  TBili  2.3<H>  /  DBili  x   /  AST  198<H>  /  ALT  192<H>  /  AlkPhos  231<H>  02-05    LIVER FUNCTIONS - ( 05 Feb 2020 06:02 )  Alb: 3.3 g/dL / Pro: 6.0 g/dL / ALK PHOS: 231 U/L / ALT: 192 U/L / AST: 198 U/L / GGT: x                   Imaging:

## 2020-02-05 NOTE — PROGRESS NOTE ADULT - PROBLEM SELECTOR PLAN 9
D/C planning back to [facility] when medically stable. hopefully in next 1-2 days.    Transitions of Care Status:  1.  Name of PCP: [facility]  2.  PCP Contacted on Admission: [ ] Y    [X ] N    3.  PCP contacted at Discharge: [ ] Y    [ ] N    [ ] N/A  4.  Post-Discharge Appointment Date and Location:  5.  Summary of Handoff given to PCP:

## 2020-02-06 ENCOUNTER — TRANSCRIPTION ENCOUNTER (OUTPATIENT)
Age: 85
End: 2020-02-06

## 2020-02-06 LAB
ALBUMIN SERPL ELPH-MCNC: 3.2 G/DL — LOW (ref 3.3–5)
ALP SERPL-CCNC: 279 U/L — HIGH (ref 40–120)
ALT FLD-CCNC: 271 U/L — HIGH (ref 10–45)
ANION GAP SERPL CALC-SCNC: 10 MMOL/L — SIGNIFICANT CHANGE UP (ref 5–17)
AST SERPL-CCNC: 200 U/L — HIGH (ref 10–40)
BILIRUB SERPL-MCNC: 1.4 MG/DL — HIGH (ref 0.2–1.2)
BUN SERPL-MCNC: 20 MG/DL — SIGNIFICANT CHANGE UP (ref 7–23)
CALCIUM SERPL-MCNC: 9.5 MG/DL — SIGNIFICANT CHANGE UP (ref 8.4–10.5)
CHLORIDE SERPL-SCNC: 104 MMOL/L — SIGNIFICANT CHANGE UP (ref 96–108)
CO2 SERPL-SCNC: 23 MMOL/L — SIGNIFICANT CHANGE UP (ref 22–31)
CREAT SERPL-MCNC: 1.26 MG/DL — SIGNIFICANT CHANGE UP (ref 0.5–1.3)
CULTURE RESULTS: SIGNIFICANT CHANGE UP
CULTURE RESULTS: SIGNIFICANT CHANGE UP
GLUCOSE SERPL-MCNC: 128 MG/DL — HIGH (ref 70–99)
HCT VFR BLD CALC: 38.3 % — LOW (ref 39–50)
HGB BLD-MCNC: 11.9 G/DL — LOW (ref 13–17)
MCHC RBC-ENTMCNC: 26 PG — LOW (ref 27–34)
MCHC RBC-ENTMCNC: 31.1 GM/DL — LOW (ref 32–36)
MCV RBC AUTO: 83.8 FL — SIGNIFICANT CHANGE UP (ref 80–100)
NRBC # BLD: 0 /100 WBCS — SIGNIFICANT CHANGE UP (ref 0–0)
PLATELET # BLD AUTO: 168 K/UL — SIGNIFICANT CHANGE UP (ref 150–400)
POTASSIUM SERPL-MCNC: 4 MMOL/L — SIGNIFICANT CHANGE UP (ref 3.5–5.3)
POTASSIUM SERPL-SCNC: 4 MMOL/L — SIGNIFICANT CHANGE UP (ref 3.5–5.3)
PROT SERPL-MCNC: 5.9 G/DL — LOW (ref 6–8.3)
RBC # BLD: 4.57 M/UL — SIGNIFICANT CHANGE UP (ref 4.2–5.8)
RBC # FLD: 14.6 % — HIGH (ref 10.3–14.5)
SODIUM SERPL-SCNC: 137 MMOL/L — SIGNIFICANT CHANGE UP (ref 135–145)
SPECIMEN SOURCE: SIGNIFICANT CHANGE UP
SPECIMEN SOURCE: SIGNIFICANT CHANGE UP
WBC # BLD: 7.02 K/UL — SIGNIFICANT CHANGE UP (ref 3.8–10.5)
WBC # FLD AUTO: 7.02 K/UL — SIGNIFICANT CHANGE UP (ref 3.8–10.5)

## 2020-02-06 PROCEDURE — 99232 SBSQ HOSP IP/OBS MODERATE 35: CPT

## 2020-02-06 RX ORDER — L.ACIDOPH/B.ANIMALIS/B.LONGUM 15B CELL
1 CAPSULE ORAL
Qty: 0 | Refills: 0 | DISCHARGE

## 2020-02-06 RX ORDER — MAGNESIUM HYDROXIDE 400 MG/1
15 TABLET, CHEWABLE ORAL
Qty: 0 | Refills: 0 | DISCHARGE

## 2020-02-06 RX ORDER — ACETAMINOPHEN 500 MG
2 TABLET ORAL
Qty: 0 | Refills: 0 | DISCHARGE

## 2020-02-06 RX ADMIN — TAMSULOSIN HYDROCHLORIDE 0.4 MILLIGRAM(S): 0.4 CAPSULE ORAL at 21:22

## 2020-02-06 RX ADMIN — Medication 25 MILLIGRAM(S): at 06:03

## 2020-02-06 RX ADMIN — HEPARIN SODIUM 5000 UNIT(S): 5000 INJECTION INTRAVENOUS; SUBCUTANEOUS at 06:03

## 2020-02-06 RX ADMIN — Medication 25 MILLIGRAM(S): at 18:24

## 2020-02-06 RX ADMIN — HEPARIN SODIUM 5000 UNIT(S): 5000 INJECTION INTRAVENOUS; SUBCUTANEOUS at 13:21

## 2020-02-06 RX ADMIN — Medication 10 MILLIGRAM(S): at 21:22

## 2020-02-06 RX ADMIN — FINASTERIDE 5 MILLIGRAM(S): 5 TABLET, FILM COATED ORAL at 13:20

## 2020-02-06 RX ADMIN — Medication 3 MILLILITER(S): at 13:20

## 2020-02-06 RX ADMIN — PIPERACILLIN AND TAZOBACTAM 25 GRAM(S): 4; .5 INJECTION, POWDER, LYOPHILIZED, FOR SOLUTION INTRAVENOUS at 02:01

## 2020-02-06 RX ADMIN — Medication 3 MILLILITER(S): at 06:03

## 2020-02-06 RX ADMIN — HEPARIN SODIUM 5000 UNIT(S): 5000 INJECTION INTRAVENOUS; SUBCUTANEOUS at 21:22

## 2020-02-06 RX ADMIN — Medication 10 MILLIGRAM(S): at 13:21

## 2020-02-06 RX ADMIN — Medication 3 MILLILITER(S): at 21:22

## 2020-02-06 RX ADMIN — Medication 10 MILLIGRAM(S): at 06:03

## 2020-02-06 NOTE — PROGRESS NOTE ADULT - PROBLEM SELECTOR PLAN 2
hepatitis panel negative  plan for choledocholithiasis as above
hepatitis panel negative  slight worsening transaminitis today suspect related to post-ERCP  continue to monitor
hepatitis panel negative  worsening transaminitis suspect related to post-ERCP now trending down slightly  continue to monitor
improved overall  hepatitis panel negative  plan for choledocholithiasis as above
obtain renal and bladder us and bladder scan
none

## 2020-02-06 NOTE — DISCHARGE NOTE PROVIDER - NSDCCPCAREPLAN_GEN_ALL_CORE_FT
PRINCIPAL DISCHARGE DIAGNOSIS  Diagnosis: Abdominal pain  Assessment and Plan of Treatment: Resolved  Diet as tolerated      SECONDARY DISCHARGE DIAGNOSES  Diagnosis: Choledocholithiasis  Assessment and Plan of Treatment: Stone removed    Diagnosis: Transaminitis  Assessment and Plan of Treatment: Improved  Follow up with PMD for repeat blood work to monitor liver function    Diagnosis: ANGEL LUIS (acute kidney injury)  Assessment and Plan of Treatment: Resolved    Diagnosis: Type 2 diabetes mellitus without complication, without long-term current use of insulin  Assessment and Plan of Treatment: Make sure you get your HgA1c checked every three months.  If you take oral diabetes medications, check your blood glucose two times a day.  If you take insulin, check your blood glucose before meals and at bedtime.  It's important not to skip any meals.  Keep a log of your blood glucose results and always take it with you to your doctor appointments.  Keep a list of your current medications including injectables and over the counter medications and bring this medication list with you to all your doctor appointments.  If you have not seen your ophthalmologist this year call for appointment.  Check your feet daily for redness, sores, or openings. Do not self treat. If no improvement in two days call your primary care physician for an appointment.  Low blood sugar (hypoglycemia) is a blood sugar below 70mg/dl. Check your blood sugar if you feel signs/symptoms of hypoglycemia. If your blood sugar is below 70 take 15 grams of carbohydrates (ex 4 oz of apple juice, 3-4 glucose tablets, or 4-6 oz of regular soda) wait 15 minutes and repeat blood sugar to make sure it comes up above 70.  If your blood sugar is above 70 and you are due for a meal, have a meal.  If you are not due for a meal have a snack.  This snack helps keeps your blood sugar at a safe range. PRINCIPAL DISCHARGE DIAGNOSIS  Diagnosis: Abdominal pain  Assessment and Plan of Treatment: Resolved  Diet as tolerated      SECONDARY DISCHARGE DIAGNOSES  Diagnosis: Choledocholithiasis  Assessment and Plan of Treatment: Stone removed    Diagnosis: Transaminitis  Assessment and Plan of Treatment: Improved  Repeat blood work, CMP in 3- 4 days to monitor liver function    Diagnosis: ANGEL LUIS (acute kidney injury)  Assessment and Plan of Treatment: Resolved    Diagnosis: Type 2 diabetes mellitus without complication, without long-term current use of insulin  Assessment and Plan of Treatment: Make sure you get your HgA1c checked every three months.  If you take oral diabetes medications, check your blood glucose two times a day.  If you take insulin, check your blood glucose before meals and at bedtime.  It's important not to skip any meals.  Keep a log of your blood glucose results and always take it with you to your doctor appointments.  Keep a list of your current medications including injectables and over the counter medications and bring this medication list with you to all your doctor appointments.  If you have not seen your ophthalmologist this year call for appointment.  Check your feet daily for redness, sores, or openings. Do not self treat. If no improvement in two days call your primary care physician for an appointment.  Low blood sugar (hypoglycemia) is a blood sugar below 70mg/dl. Check your blood sugar if you feel signs/symptoms of hypoglycemia. If your blood sugar is below 70 take 15 grams of carbohydrates (ex 4 oz of apple juice, 3-4 glucose tablets, or 4-6 oz of regular soda) wait 15 minutes and repeat blood sugar to make sure it comes up above 70.  If your blood sugar is above 70 and you are due for a meal, have a meal.  If you are not due for a meal have a snack.  This snack helps keeps your blood sugar at a safe range.

## 2020-02-06 NOTE — PROGRESS NOTE ADULT - REASON FOR ADMISSION
89F w/ fever and elevated liver enzymes

## 2020-02-06 NOTE — DISCHARGE NOTE PROVIDER - NSDCMRMEDTOKEN_GEN_ALL_CORE_FT
aspirin 81 mg oral tablet: 1 tab(s) orally once a day  bethanechol 10 mg oral tablet: 1 tab(s) orally 3 times a day  finasteride 5 mg oral tablet: 1 tab(s) orally once a day  Flomax 0.4 mg oral capsule: 1 cap(s) orally once a day  Metoprolol Tartrate 25 mg oral tablet: 1 tab(s) orally 2 times a day  Milk of Magnesia 8% oral suspension: 15 milliliter(s) orally once a day (at bedtime), As Needed  Probiotic Formula oral capsule: 1 cap(s) orally once a day  Tylenol 325 mg oral tablet: 2 tab(s) orally every 4 hours, As Needed  Vitamin D2 50,000 intl units (1.25 mg) oral capsule: 1 cap(s) orally three times per month aspirin 81 mg oral tablet: 1 tab(s) orally once a day  bethanechol 10 mg oral tablet: 1 tab(s) orally 3 times a day  finasteride 5 mg oral tablet: 1 tab(s) orally once a day  Flomax 0.4 mg oral capsule: 1 cap(s) orally once a day  Metoprolol Tartrate 25 mg oral tablet: 1 tab(s) orally 2 times a day  Vitamin D2 50,000 intl units (1.25 mg) oral capsule: 1 cap(s) orally three times per month aspirin 81 mg oral tablet: 1 tab(s) orally once a day START 2/8/2020  bethanechol 10 mg oral tablet: 1 tab(s) orally 3 times a day  finasteride 5 mg oral tablet: 1 tab(s) orally once a day  Flomax 0.4 mg oral capsule: 1 cap(s) orally once a day  ipratropium-albuterol 0.5 mg-2.5 mg/3 mLinhalation solution: 3 milliliter(s) inhaled every 8 hours  Metoprolol Tartrate 25 mg oral tablet: 1 tab(s) orally 2 times a day  Vitamin D2 50,000 intl units (1.25 mg) oral capsule: 1 cap(s) orally three times per month

## 2020-02-06 NOTE — DISCHARGE NOTE PROVIDER - HOSPITAL COURSE
89M w/ dementia (A&Ox1-2), reported hx of acute cholecystitis s/p cholecystectomy, gallstone pancreatitis, gastrojejunostomy, BPH s/p TURP, HTN, DM2 presented from SNF for evaluation of fever with transaminitis. Patient found to have distal choledocholithiasis on MRCP and suspected mild cholangitis s/p ERCP with removal of sludge and stone on 2/4.    2/1 Bcx negative    completed 5 day course of IV zosyn.         Transaminitis. hepatitis panel negative    worsening transaminitis suspect related to post-ERCP now trending down slightly         Bladder wall thickening. see on CT a/p    UA/Ucx negative    renal U/S with b/l cysts noted    patient urinating without difficulty.         Benign prostatic hyperplasia, unspecified whether lower urinary tract symptoms present.  Plan: c/w finasteride and tamsulosin    - on bethanechol.         Type 2 diabetes mellitus without complication, without long-term current use of insulin. Plan: charted hx DM2    last HbA1c 6.0 in 2/2019    no need for ISS at this time. 89M w/ dementia (A&Ox1-2), reported hx of acute cholecystitis s/p cholecystectomy, gallstone pancreatitis, gastrojejunostomy, BPH s/p TURP, HTN, DM2 presented from SNF for evaluation of fever with transaminitis. Patient found to have distal choledocholithiasis on MRCP and suspected mild cholangitis s/p ERCP with removal of sludge and stone on 2/4. Bcx remained negative and the patient completed 5 day course of IV zosyn. Patient had worsening transaminitis post-ERCP suspected related to procedure which trended down prior to discharge. Patient tolerated diet without complaints of GI symptoms. 89M w/ dementia (A&Ox1-2), reported hx of acute cholecystitis s/p cholecystectomy, gallstone pancreatitis, gastrojejunostomy, BPH s/p TURP, HTN, DM2 presented from SNF for evaluation of fever with transaminitis. Patient found to have distal choledocholithiasis on MRCP and suspected mild cholangitis s/p ERCP with removal of sludge and stone on 2/4. Bcx remained negative and the patient completed 5 day course of IV zosyn. Patient had worsening transaminitis post-ERCP suspected related to procedure which trended down prior to discharge. Hospital course further c/b ANGEL LUIS which resolved with IVF. Patient tolerated diet without complaints of GI symptoms. 89M w/ dementia (A&Ox1-2), reported hx of acute cholecystitis s/p cholecystectomy, gallstone pancreatitis, gastrojejunostomy, BPH s/p TURP, HTN, DM2 presented from SNF for evaluation of fever with transaminitis. Patient found to have distal choledocholithiasis on MRCP and suspected mild cholangitis s/p ERCP with removal of sludge and stone on 2/4. Bcx remained negative and the patient completed 5 day course of IV zosyn. Patient had worsening transaminitis post-ERCP suspected related to procedure which trended down prior to discharge. Hospital course further c/b ANGEL LUIS (based on likely baseline serum cr 0.91 from 2/2019) which resolved with IVF. Patient tolerated diet without complaints of GI symptoms.

## 2020-02-06 NOTE — PROGRESS NOTE ADULT - PROBLEM SELECTOR PLAN 1
MRCP with + stone in distal CBD  ? if patient presented with a component of mild cholangitis given reported fever  2/1 Bcx negative  s/p ERCP 2/4 with removal of sludge and stone  completed 5 day course of IV zosyn

## 2020-02-06 NOTE — PROGRESS NOTE ADULT - PROBLEM SELECTOR PLAN 9
D/C planning back to [facility] when ok with GI. d/c time 35 mins.    Transitions of Care Status:  1.  Name of PCP: [facility]  2.  PCP Contacted on Admission: [ ] Y    [X ] N    3.  PCP contacted at Discharge: [ ] Y    [ ] N    [ ] N/A  4.  Post-Discharge Appointment Date and Location:  5.  Summary of Handoff given to PCP:

## 2020-02-06 NOTE — PROGRESS NOTE ADULT - SUBJECTIVE AND OBJECTIVE BOX
[facility] Division of Hospital Medicine  Qian Hunt MD  Pager (M-F, 8A-5P): [phone]  Other Times:  [phone]    Patient is a 89y old  Male who presents with a chief complaint of 89F w/ fever and elevated liver enzymes (05 Feb 2020 10:59)      SUBJECTIVE / OVERNIGHT EVENTS: no acute events  ADDITIONAL REVIEW OF SYSTEMS: patient tolerating diet, + BM yesterday. no ab pain.     MEDICATIONS  (STANDING):  albuterol/ipratropium for Nebulization 3 milliLiter(s) Nebulizer every 8 hours  bethanechol 10 milliGRAM(s) Oral three times a day  finasteride 5 milliGRAM(s) Oral daily  heparin  Injectable 5000 Unit(s) SubCutaneous every 8 hours  lactated ringers. 500 milliLiter(s) (50 mL/Hr) IV Continuous <Continuous>  metoprolol tartrate 25 milliGRAM(s) Oral two times a day  tamsulosin 0.4 milliGRAM(s) Oral at bedtime    MEDICATIONS  (PRN):  haloperidol    Injectable 2.5 milliGRAM(s) IV Push every 6 hours PRN severe agitation      CAPILLARY BLOOD GLUCOSE        I&O's Summary    05 Feb 2020 07:01  -  06 Feb 2020 07:00  --------------------------------------------------------  IN: 900 mL / OUT: 0 mL / NET: 900 mL        PHYSICAL EXAM:  Vital Signs Last 24 Hrs  T(C): 36.4 (06 Feb 2020 04:26), Max: 36.7 (05 Feb 2020 21:44)  T(F): 97.5 (06 Feb 2020 04:26), Max: 98 (05 Feb 2020 21:44)  HR: 67 (06 Feb 2020 04:26) (61 - 67)  BP: 124/56 (06 Feb 2020 04:26) (113/60 - 147/78)  BP(mean): --  RR: 18 (06 Feb 2020 04:26) (18 - 18)  SpO2: 96% (06 Feb 2020 04:26) (92% - 96%)    CONSTITUTIONAL: NAD, well-developed, well-groomed  NECK: Supple, no palpable masses; no thyromegaly  RESPIRATORY: Normal respiratory effort; lungs are clear to auscultation bilaterally  CARDIOVASCULAR: normal S1 and S2; No lower extremity edema  ABDOMEN: Nontender to palpation, normoactive bowel sounds, no rebound/guarding; No hepatosplenomegaly  MUSCULOSKELETAL:  no clubbing or cyanosis of digits; no joint swelling or tenderness to palpation  PSYCH: A+O to person, place(intermittently in [place]ian); affect appropriate   SKIN: No rashes; no palpable lesions    LABS:                        11.9   7.02  )-----------( 168      ( 06 Feb 2020 06:21 )             38.3     02-06    137  |  104  |  20  ----------------------------<  128<H>  4.0   |  23  |  1.26    Ca    9.5      06 Feb 2020 06:21    TPro  5.9<L>  /  Alb  3.2<L>  /  TBili  1.4<H>  /  DBili  x   /  AST  200<H>  /  ALT  271<H>  /  AlkPhos  279<H>  02-06                RADIOLOGY & ADDITIONAL TESTS:  Results Reviewed:   Imaging Personally Reviewed:  Electrocardiogram Personally Reviewed:    COORDINATION OF CARE:  Care Discussed with Consultants/Other Providers [Y/N]: medicine NP  Prior or Outpatient Records Reviewed [Y/N]:

## 2020-02-06 NOTE — DISCHARGE NOTE PROVIDER - CARE PROVIDER_API CALL
Trae Cabral)  Gastroenterology; Internal Medicine  Division of Gastroenterology  [address] [place] [address]  Phone: [phone]  Fax: [phone]  Follow Up Time:

## 2020-02-06 NOTE — CHART NOTE - NSCHARTNOTEFT_GEN_A_CORE
Pt is medically cleared for discharge by Dr. Hunt. Discussed with daughter Caterina and she's agreeable to have her dad transferred back to [facility].

## 2020-02-07 VITALS
HEART RATE: 60 BPM | TEMPERATURE: 98 F | DIASTOLIC BLOOD PRESSURE: 75 MMHG | SYSTOLIC BLOOD PRESSURE: 140 MMHG | OXYGEN SATURATION: 92 % | RESPIRATION RATE: 18 BRPM

## 2020-02-07 LAB — GLUCOSE BLDC GLUCOMTR-MCNC: 233 MG/DL — HIGH (ref 70–99)

## 2020-02-07 PROCEDURE — 97110 THERAPEUTIC EXERCISES: CPT

## 2020-02-07 PROCEDURE — 81001 URINALYSIS AUTO W/SCOPE: CPT

## 2020-02-07 PROCEDURE — C1769: CPT

## 2020-02-07 PROCEDURE — 84100 ASSAY OF PHOSPHORUS: CPT

## 2020-02-07 PROCEDURE — 94640 AIRWAY INHALATION TREATMENT: CPT

## 2020-02-07 PROCEDURE — 87798 DETECT AGENT NOS DNA AMP: CPT

## 2020-02-07 PROCEDURE — 83605 ASSAY OF LACTIC ACID: CPT

## 2020-02-07 PROCEDURE — 85610 PROTHROMBIN TIME: CPT

## 2020-02-07 PROCEDURE — 87486 CHLMYD PNEUM DNA AMP PROBE: CPT

## 2020-02-07 PROCEDURE — 87581 M.PNEUMON DNA AMP PROBE: CPT

## 2020-02-07 PROCEDURE — 99285 EMERGENCY DEPT VISIT HI MDM: CPT | Mod: 25

## 2020-02-07 PROCEDURE — 96374 THER/PROPH/DIAG INJ IV PUSH: CPT | Mod: XU

## 2020-02-07 PROCEDURE — 76857 US EXAM PELVIC LIMITED: CPT

## 2020-02-07 PROCEDURE — 82803 BLOOD GASES ANY COMBINATION: CPT

## 2020-02-07 PROCEDURE — 83735 ASSAY OF MAGNESIUM: CPT

## 2020-02-07 PROCEDURE — 97116 GAIT TRAINING THERAPY: CPT

## 2020-02-07 PROCEDURE — 74177 CT ABD & PELVIS W/CONTRAST: CPT

## 2020-02-07 PROCEDURE — 82248 BILIRUBIN DIRECT: CPT

## 2020-02-07 PROCEDURE — 99239 HOSP IP/OBS DSCHRG MGMT >30: CPT

## 2020-02-07 PROCEDURE — 86704 HEP B CORE ANTIBODY TOTAL: CPT

## 2020-02-07 PROCEDURE — 71045 X-RAY EXAM CHEST 1 VIEW: CPT

## 2020-02-07 PROCEDURE — 83690 ASSAY OF LIPASE: CPT

## 2020-02-07 PROCEDURE — 80053 COMPREHEN METABOLIC PANEL: CPT

## 2020-02-07 PROCEDURE — 82962 GLUCOSE BLOOD TEST: CPT

## 2020-02-07 PROCEDURE — 97161 PT EVAL LOW COMPLEX 20 MIN: CPT

## 2020-02-07 PROCEDURE — 86803 HEPATITIS C AB TEST: CPT

## 2020-02-07 PROCEDURE — 74330 X-RAY BILE/PANC ENDOSCOPY: CPT

## 2020-02-07 PROCEDURE — 87040 BLOOD CULTURE FOR BACTERIA: CPT

## 2020-02-07 PROCEDURE — 74181 MRI ABDOMEN W/O CONTRAST: CPT

## 2020-02-07 PROCEDURE — 87633 RESP VIRUS 12-25 TARGETS: CPT

## 2020-02-07 PROCEDURE — 76700 US EXAM ABDOM COMPLETE: CPT

## 2020-02-07 PROCEDURE — 80074 ACUTE HEPATITIS PANEL: CPT

## 2020-02-07 PROCEDURE — 85730 THROMBOPLASTIN TIME PARTIAL: CPT

## 2020-02-07 PROCEDURE — 85027 COMPLETE CBC AUTOMATED: CPT

## 2020-02-07 PROCEDURE — 87086 URINE CULTURE/COLONY COUNT: CPT

## 2020-02-07 RX ORDER — ASPIRIN/CALCIUM CARB/MAGNESIUM 324 MG
1 TABLET ORAL
Qty: 0 | Refills: 0 | DISCHARGE

## 2020-02-07 RX ORDER — IPRATROPIUM/ALBUTEROL SULFATE 18-103MCG
3 AEROSOL WITH ADAPTER (GRAM) INHALATION
Qty: 0 | Refills: 0 | DISCHARGE
Start: 2020-02-07

## 2020-02-07 RX ADMIN — Medication 3 MILLILITER(S): at 06:21

## 2020-02-07 RX ADMIN — HEPARIN SODIUM 5000 UNIT(S): 5000 INJECTION INTRAVENOUS; SUBCUTANEOUS at 13:14

## 2020-02-07 RX ADMIN — Medication 10 MILLIGRAM(S): at 06:22

## 2020-02-07 RX ADMIN — FINASTERIDE 5 MILLIGRAM(S): 5 TABLET, FILM COATED ORAL at 11:16

## 2020-02-07 RX ADMIN — HEPARIN SODIUM 5000 UNIT(S): 5000 INJECTION INTRAVENOUS; SUBCUTANEOUS at 06:22

## 2020-02-07 RX ADMIN — Medication 3 MILLILITER(S): at 13:14

## 2020-02-07 RX ADMIN — Medication 25 MILLIGRAM(S): at 06:22

## 2020-02-07 RX ADMIN — Medication 10 MILLIGRAM(S): at 13:14

## 2020-02-07 NOTE — CHART NOTE - NSCHARTNOTEFT_GEN_A_CORE
patient seen and examined earlier this morning. denies ab pain. tolerating diet and having bowel movement. VSS. Case d/w GI team n 2/6 and ok to discharge as transaminitis improving. Plan to monitor LFT at SNF. D/C time 35 mins. patient seen and examined earlier this morning. denies ab pain. tolerating diet and having bowel movement. VSS. Case d/w GI team n 2/6 and ok to discharge as transaminitis improving. Plan to monitor LFT at SNF. Resume aspirin 2/8. D/C time 35 mins.

## 2020-02-07 NOTE — PHARMACOTHERAPY INTERVENTION NOTE - COMMENTS
Counseled patient on discharge medication doses, indications, and possible side effects with [place] telephone  present.    Vin Pathak  PGY-1 Pharmacy Resident  Pager: [phone]

## 2021-06-17 ENCOUNTER — EMERGENCY (EMERGENCY)
Facility: HOSPITAL | Age: 86
LOS: 1 days | Discharge: HOME | End: 2021-06-17
Attending: EMERGENCY MEDICINE
Payer: MEDICARE

## 2021-06-17 VITALS
TEMPERATURE: 99 F | HEIGHT: 65 IN | SYSTOLIC BLOOD PRESSURE: 122 MMHG | WEIGHT: 164.91 LBS | OXYGEN SATURATION: 96 % | DIASTOLIC BLOOD PRESSURE: 70 MMHG | HEART RATE: 63 BPM | RESPIRATION RATE: 16 BRPM

## 2021-06-17 DIAGNOSIS — Z90.49 ACQUIRED ABSENCE OF OTHER SPECIFIED PARTS OF DIGESTIVE TRACT: Chronic | ICD-10-CM

## 2021-06-17 LAB
ALBUMIN SERPL ELPH-MCNC: 3.6 G/DL — SIGNIFICANT CHANGE UP (ref 3.3–5)
ALP SERPL-CCNC: 89 U/L — SIGNIFICANT CHANGE UP (ref 40–120)
ALT FLD-CCNC: 21 U/L — SIGNIFICANT CHANGE UP (ref 10–45)
ANION GAP SERPL CALC-SCNC: 10 MMOL/L — SIGNIFICANT CHANGE UP (ref 5–17)
APPEARANCE UR: CLEAR — SIGNIFICANT CHANGE UP
AST SERPL-CCNC: 13 U/L — SIGNIFICANT CHANGE UP (ref 10–40)
BACTERIA # UR AUTO: NEGATIVE — SIGNIFICANT CHANGE UP
BASOPHILS # BLD AUTO: 0.04 K/UL — SIGNIFICANT CHANGE UP (ref 0–0.2)
BASOPHILS NFR BLD AUTO: 0.6 % — SIGNIFICANT CHANGE UP (ref 0–2)
BILIRUB SERPL-MCNC: 0.2 MG/DL — SIGNIFICANT CHANGE UP (ref 0.2–1.2)
BILIRUB UR-MCNC: NEGATIVE — SIGNIFICANT CHANGE UP
BUN SERPL-MCNC: 20 MG/DL — SIGNIFICANT CHANGE UP (ref 7–23)
CALCIUM SERPL-MCNC: 9.6 MG/DL — SIGNIFICANT CHANGE UP (ref 8.4–10.5)
CHLORIDE SERPL-SCNC: 103 MMOL/L — SIGNIFICANT CHANGE UP (ref 96–108)
CO2 SERPL-SCNC: 25 MMOL/L — SIGNIFICANT CHANGE UP (ref 22–31)
COLOR SPEC: COLORLESS — SIGNIFICANT CHANGE UP
CREAT SERPL-MCNC: 1 MG/DL — SIGNIFICANT CHANGE UP (ref 0.5–1.3)
DIFF PNL FLD: NEGATIVE — SIGNIFICANT CHANGE UP
EOSINOPHIL # BLD AUTO: 0.24 K/UL — SIGNIFICANT CHANGE UP (ref 0–0.5)
EOSINOPHIL NFR BLD AUTO: 3.3 % — SIGNIFICANT CHANGE UP (ref 0–6)
EPI CELLS # UR: 0 /HPF — SIGNIFICANT CHANGE UP
GLUCOSE SERPL-MCNC: 210 MG/DL — HIGH (ref 70–99)
GLUCOSE UR QL: NEGATIVE — SIGNIFICANT CHANGE UP
HCT VFR BLD CALC: 36.3 % — LOW (ref 39–50)
HGB BLD-MCNC: 11.6 G/DL — LOW (ref 13–17)
HYALINE CASTS # UR AUTO: 0 /LPF — SIGNIFICANT CHANGE UP (ref 0–2)
IMM GRANULOCYTES NFR BLD AUTO: 0.7 % — SIGNIFICANT CHANGE UP (ref 0–1.5)
KETONES UR-MCNC: NEGATIVE — SIGNIFICANT CHANGE UP
LEUKOCYTE ESTERASE UR-ACNC: ABNORMAL
LYMPHOCYTES # BLD AUTO: 2.56 K/UL — SIGNIFICANT CHANGE UP (ref 1–3.3)
LYMPHOCYTES # BLD AUTO: 35.2 % — SIGNIFICANT CHANGE UP (ref 13–44)
MCHC RBC-ENTMCNC: 27.6 PG — SIGNIFICANT CHANGE UP (ref 27–34)
MCHC RBC-ENTMCNC: 32 GM/DL — SIGNIFICANT CHANGE UP (ref 32–36)
MCV RBC AUTO: 86.2 FL — SIGNIFICANT CHANGE UP (ref 80–100)
MONOCYTES # BLD AUTO: 0.75 K/UL — SIGNIFICANT CHANGE UP (ref 0–0.9)
MONOCYTES NFR BLD AUTO: 10.3 % — SIGNIFICANT CHANGE UP (ref 2–14)
NEUTROPHILS # BLD AUTO: 3.63 K/UL — SIGNIFICANT CHANGE UP (ref 1.8–7.4)
NEUTROPHILS NFR BLD AUTO: 49.9 % — SIGNIFICANT CHANGE UP (ref 43–77)
NITRITE UR-MCNC: NEGATIVE — SIGNIFICANT CHANGE UP
NRBC # BLD: 0 /100 WBCS — SIGNIFICANT CHANGE UP (ref 0–0)
PH UR: 5.5 — SIGNIFICANT CHANGE UP (ref 5–8)
PLATELET # BLD AUTO: 147 K/UL — LOW (ref 150–400)
POTASSIUM SERPL-MCNC: 4.6 MMOL/L — SIGNIFICANT CHANGE UP (ref 3.5–5.3)
POTASSIUM SERPL-SCNC: 4.6 MMOL/L — SIGNIFICANT CHANGE UP (ref 3.5–5.3)
PROT SERPL-MCNC: 6.4 G/DL — SIGNIFICANT CHANGE UP (ref 6–8.3)
PROT UR-MCNC: NEGATIVE — SIGNIFICANT CHANGE UP
RBC # BLD: 4.21 M/UL — SIGNIFICANT CHANGE UP (ref 4.2–5.8)
RBC # FLD: 13 % — SIGNIFICANT CHANGE UP (ref 10.3–14.5)
RBC CASTS # UR COMP ASSIST: 0 /HPF — SIGNIFICANT CHANGE UP (ref 0–4)
SODIUM SERPL-SCNC: 138 MMOL/L — SIGNIFICANT CHANGE UP (ref 135–145)
SP GR SPEC: 1.01 — LOW (ref 1.01–1.02)
UROBILINOGEN FLD QL: NEGATIVE — SIGNIFICANT CHANGE UP
WBC # BLD: 7.27 K/UL — SIGNIFICANT CHANGE UP (ref 3.8–10.5)
WBC # FLD AUTO: 7.27 K/UL — SIGNIFICANT CHANGE UP (ref 3.8–10.5)
WBC UR QL: 2 /HPF — SIGNIFICANT CHANGE UP (ref 0–5)

## 2021-06-17 PROCEDURE — 99284 EMERGENCY DEPT VISIT MOD MDM: CPT | Mod: 25

## 2021-06-17 PROCEDURE — 70450 CT HEAD/BRAIN W/O DYE: CPT | Mod: 26,ME

## 2021-06-17 PROCEDURE — 81001 URINALYSIS AUTO W/SCOPE: CPT

## 2021-06-17 PROCEDURE — 85025 COMPLETE CBC W/AUTO DIFF WBC: CPT

## 2021-06-17 PROCEDURE — 70450 CT HEAD/BRAIN W/O DYE: CPT

## 2021-06-17 PROCEDURE — 80053 COMPREHEN METABOLIC PANEL: CPT

## 2021-06-17 PROCEDURE — 71045 X-RAY EXAM CHEST 1 VIEW: CPT | Mod: 26

## 2021-06-17 PROCEDURE — 93010 ELECTROCARDIOGRAM REPORT: CPT

## 2021-06-17 PROCEDURE — 99284 EMERGENCY DEPT VISIT MOD MDM: CPT

## 2021-06-17 PROCEDURE — 93005 ELECTROCARDIOGRAM TRACING: CPT

## 2021-06-17 PROCEDURE — 71045 X-RAY EXAM CHEST 1 VIEW: CPT

## 2021-06-17 PROCEDURE — G1004: CPT

## 2021-06-17 RX ORDER — CEPHALEXIN 500 MG
500 CAPSULE ORAL ONCE
Refills: 0 | Status: COMPLETED | OUTPATIENT
Start: 2021-06-17 | End: 2021-06-17

## 2021-06-17 RX ADMIN — Medication 500 MILLIGRAM(S): at 23:29
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
## 2021-06-17 NOTE — ED PROVIDER NOTE - PROGRESS NOTE DETAILS
Alexander Rausch PA-C: labs and imaging unremarkable, UA +leuks will collect UCx and treat for UTI w/ keflex, pt cooperative in ED, [facility] and spoke to nurse manger for pharmacy who said to include diagnosis and abx rec in DC paperwork and they will order abx. Alexander Rausch PA-C: labs and imaging unremarkable, UA +leuks will collect UCx and treat for UTI w/ keflex, pt cooperative in ED (constant obs NOT NEEDED while in ED. Pt was witnessed attempting to get out of bed but did not have call ball near by and was trying to get up to use bathroom), non-emergent transport form given to red desk, called SNF and spoke to nurse jimi for pharmacy who said to include diagnosis and abx rec in DC paperwork and they will order abx.

## 2021-06-17 NOTE — ED ADULT NURSE NOTE - NSIMPLEMENTINTERV_GEN_ALL_ED
Implemented All Fall with Harm Risk Interventions:  [place] to call system. Call bell, personal items and telephone within reach. Instruct patient to call for assistance. Room bathroom lighting operational. Non-slip footwear when patient is off stretcher. Physically safe environment: no spills, clutter or unnecessary equipment. Stretcher in lowest position, wheels locked, appropriate side rails in place. Provide visual cue, wrist band, yellow gown, etc. Monitor gait and stability. Monitor for mental status changes and reorient to person, place, and time. Review medications for side effects contributing to fall risk. Reinforce activity limits and safety measures with patient and family. Provide visual clues: red socks.
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
## 2021-06-17 NOTE — ED PROVIDER NOTE - NSFOLLOWUPINSTRUCTIONS_ED_ALL_ED_FT
In the ER your were diagnosed with a presumed urinary tract infection. A urine culture was sent to confirm bacterial growth and should result in a couple of days.    ***Take Keflex 500mg every 8 hours for 5 days for UTI***    1) Follow-up with your primary care provider in 1-2 days.    2) Continue to take all medications as prescribed.    3) Rest and drink plenty of fluids. Pain can be managed with Acetaminophen (aka Tylenol) over the counter as directed. Take with food.    4) Return to the ER for any new or worsening symptoms.
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
## 2021-06-17 NOTE — ED PROVIDER NOTE - CARE PLAN
Principal Discharge DX:	Altered mental status   Principal Discharge DX:	Altered mental status  Secondary Diagnosis:	UTI (urinary tract infection)

## 2021-06-17 NOTE — ED PROVIDER NOTE - PATIENT PORTAL LINK FT
You can access the FollowMyHealth Patient Portal offered by [facility] by registering at the following website: [email or web address]. By joining [employer]’s FollowMyHealth portal, you will also be able to view your health information using other applications (apps) compatible with our system.

## 2021-06-18 VITALS
OXYGEN SATURATION: 98 % | DIASTOLIC BLOOD PRESSURE: 65 MMHG | HEART RATE: 85 BPM | RESPIRATION RATE: 16 BRPM | SYSTOLIC BLOOD PRESSURE: 140 MMHG

## 2021-06-18 PROBLEM — K81.0 ACUTE CHOLECYSTITIS: Chronic | Status: ACTIVE | Noted: 2020-01-31

## 2021-06-18 NOTE — ED ADULT NURSE REASSESSMENT NOTE - NS ED NURSE REASSESS COMMENT FT1
Report called and given to Nela HSIEH at [facility]. Report called and given to Nela HSIEH(Ext. [phone] nursing supervisor) at [facility].

## 2021-06-18 NOTE — ED ADULT NURSE REASSESSMENT NOTE - NS ED NURSE REASSESS COMMENT FT1
Patient sleeping comfortably in stretcher, safety and comfort measures maintained. Pending pickup by non-emergent.

## 2021-07-20 NOTE — PRE-ANESTHESIA EVALUATION ADULT - NSATTENDATTESTRD_GEN_ALL_CORE
Caller: Diana Higgins    Relationship to patient: Self    Best call back number: [phone]    Chief complaint: PATIENT IS INQUIRING IF YOU ALL MAY HAVE AN APPT. TOMORROW FOR HER LABS AROUND 10:00 A.M., HOPING TO COME IN A LITTLE LATER SINCE IT IS JUST LABS.    Type of visit: LABS    Requested date: SAME DAY, AROUND 10:00  A.M.    If rescheduling, when is the original appointment: 7/21/2021    Additional notes:  SHE IS OKAY IF THERE IS NOTHING AVAILABLE FOR A LATER TIME.        
The patient has been re-examined and I agree with the above assessment or I updated with my findings.

## 2021-11-07 NOTE — PROGRESS NOTE ADULT - PROBLEM/PLAN-3
[id number]     DISPLAY PLAN FREE TEXT

## 2023-01-01 NOTE — PROGRESS NOTE ADULT - PROBLEM SELECTOR PROBLEM 2
INITIAL OFFICE VISIT      Marlon Stewart  2023             Visit Vitals  Pulse 162   Temp 98.2 °F (36.8 °C) (Temporal)   Ht 21.5\" (54.6 cm)   Wt (!) 4.408 kg (9 lb 11.5 oz)   HC 37 cm (14.57\")   BMI 14.78 kg/m²     71 %ile (Z= 0.56) based on WHO (Boys, 0-2 years) weight-for-age data using vitals from 2023.  79 %ile (Z= 0.80) based on WHO (Boys, 0-2 years) Length-for-age data based on Length recorded on 2023.  72 %ile (Z= 0.57) based on WHO (Boys, 0-2 years) head circumference-for-age based on Head Circumference recorded on 2023.  47 %ile (Z= -0.08) based on WHO (Boys, 0-2 years) weight-for-recumbent length data based on body measurements available as of 2023.         YOUR BABY AT BIRTH TO 2 WEEKS OF AGE    Key points at this age…     Breast milk is the best nutrition.    Car seats save lives--make sure to install and use them correctly!    “Back to sleep” is recommended--it’s the safest sleeping position for your young infant!    FEEDING: Breast milk is the best food for Marlon at this age; it is beneficial in many ways to both babies and moms! It can be challenging early on, so ask for help from your doctors and the lactation specialists at the hospital. Breastfeeding moms should make sure their doctors know about any medications they are taking.     If you are bottle feeding, make sure to prepare formula exactly as directed (standard formula preparation is one scoop of formula in 2 ounces of water). You should hold your baby upright (not lying down) while feeding and never prop the bottle so that it stays in your baby’s mouth without you holding it (this can cause dangerous choking episodes as well as ear infections).     ABOUT VITAMIN D: All babies ( or formula fed) need extra vitamin D. Vitamin D is very important for bone health (especially preventing rickets) as well as other important health aspects. Breast milk is the “perfect” food except that it  does not contain enough vitamin D for your baby. Formula has some vitamin D in it, but your baby needs extra until they are taking ~32-33 ounces of formula daily. We recommend 400 IU of vitamin D once daily. Vitamin D drops are available in two forms-- a concentrated drop which provides the full dose in one DROP or a liquid product which is dosed at one ml (or one cc-- the syringes provided with the drops are labeled with the correct dose). Make sure to follow the instructions on the bottle exactly for proper dosing! A  baby should continue this for as long as they are primarily breastfeeding; by the time a formula-fed baby goes through one bottle of vitamin D drops they are likely getting enough vitamin D in their formula.     BEHAVIOR & CRYING: Touch and hold and carry your baby as often as you can. Cuddling, caressing, talking and singing to your baby makes them feel secure and loved. You will not spoil them when they are this young! Be sure to focus on them when you are feeding them; this is an important time for bonding and social development (don't be distracted by your cell phone or computer-- focus on your baby!). It’s also important to remember that other children in the house may be jealous of the new baby, and sometimes they may act out to get your attention. Try to set aside short periods of time every day devoted exclusively to your older child (or children). Also, try to find simple ways that they can help care for the baby-- this will help them connect better.      CAR SAFETY:  Your baby should be secured in a rear-facing infant car safety seat whenever riding in the car for at least the first year of life.This will protect your baby in case of an accident, plus it is the law. When choosing a car seat, you can check on any safety concerns for a particular model at www.safercar.gov. You can also search for local inspection stations on that site. (Or at www.[employer]kid[employer]i.org.) Correct installation  of the car seat is critical (at least 7 out of 10 car seats are estimated to be incorrectly installed or used!). A certified car seat  can ensure your baby is as safely secured as possible. You should also register your car seat with the , so that you will be notified of any future problems or recalls with that seat. The straps need to be very snug to protect your baby in case of an accident (so no thick coats or snowsuits while riding in the car during the winter!). Never leave a child alone in a car, even for a very short time.      SAFE SLEEPING: The safest place for a  to sleep is in their own crib in their parent’s room; this is recommended until at least 6 months of age. They should always be placed on their back to sleep (not on their tummy or their side). This “back to sleep” position decreases the risk of crib death (SIDS). Avoid loose, soft bedding (blankets, comforters, sheepskins, quilts, pillows, pillow-like bumper pads) and soft toys in the baby’s crib because they are a risk for suffocation (and SIDS). “Sleep sacks” and swaddling with thin blankets are okay. Cribs (including Pack-n-Plays) should be certified by JPMA (a safety agency for baby products). Safety criteria for cribs include: slats or bars no more than 2-3/8 inches (6cm) apart, a snug-fitting mattress, and a distance of no less than 26 inches from the mattress to the top rail. Sleeping in or with other devices (car seats, swings, bed sharing devices, cushions) are not recommended; likewise, sleeping on sofas or in armchairs is very dangerous for small infants.     TUMMY TIME: It is safe to start “tummy time” as soon as you bring your baby home. (Laying on your chest or across your lap counts!) It should always be done when your baby is awake. Start with a few minutes at a time, and increase it as your baby grows older. (Never leave your baby alone on their tummy.) Tummy time strengthens their neck and shoulder  muscles and helps with their development. (It also prevents lopsided or flat heads that come from lying in the same position on their back all the time.)    UMBILICAL CORD CARE: The umbilical cord stump and the area around it should be left completely dry until the cord  stump falls off. (Washing it or using alcohol wipes will just delay how long it takes to fall off.) Keep it open to air as much as possible. Give your baby sponge baths early on; don’t put them in a tub with their belly button underwater until the cord has fallen off and the base of it appears dry. A tiny bit of oozing blood when the cord falls off is normal.     OTHER SAFETY GUIDELINES:  Burns:  Adjust your hot-water heater to 120-125°F or use the “low” setting. This will decrease the risk of scald burns (and save money on your utility bills!).     Smoke and carbon monoxide detectors: Make sure that detectors are on each level of your home, especially near sleeping areas. Check the batteries regularly and replace them at least twice a year. Discuss a “fire escape plan” with all family members.     No smoking! Exposure to tobacco smoke puts children at risk for lots of problems (asthma and other severe breathing problems, crib death [SIDS], ear infections and even behavior and learning problems). If you smoke, this is the time to talk to your doctor about quitting. If you can’t quit, keep all smoking (including e-cigarettes and vaping) outside the home (not just in another room), and all smokers should change their clothes and wash their hands before handling the baby.     Tap water is safe! Tap water is safe for formula preparation. News reports in 2016 raised concerns about a risk of lead in the [place] city water supply. This water supply is very safe--there is no lead in the water that leaves the local water treatment center. In some older homes (built in 1951), there may be lead in the service pipe lines (which carry water from the main water  pipe to individual homes). When water sits in these pipes for prolonged periods, some lead may dissolve into the water. As a precaution, the [facility] recommends a water filter at the tap of homes which have lead service lines. If you are not sure when your home was built, do an internet search for \"[place] lead awareness and drinking water safety\". From this web page, you can search for your address to find out if your home was built with lead service lines. There are also helpful hints regarding water safety on this site. Another option is to call the Customer Service line at (265) 417-4556. If it would make you feel better, you could also get a filter for your faucet or tap. This would help save you money (and the environment--which is going to be important to your baby's future!).     OTHER  ISSUES:   Body temperature: A rectal temperature is most accurate way to check for fever in this age group. Normal rectal temperatures range between 97.9 and 100.3°F. A fever is defined as a rectal temperature of 100.4°F or higher. Call the doctor or have Marlon seen if they have a fever. In a baby this young, a fever needs to be evaluated. Do not give Tylenol or ibuprofen at this age.     Bowel movements:  Once your baby is feeding well, how often they poop may vary from once every feeding to once every three to four days ( babies often poop less than once a day).  It is normal for babies to strain and turn a little red in the face with bowel movements, as long as the stool they pass is soft.  If you feel Marlon is very uncomfortable, call the clinic. Blood in the stool may be normal early on in nursing infants, but you should call us to discuss it if you do see any blood.    Most Recent Immunizations   Administered Date(s) Administered    Hep B, adolescent or pediatric 2023          Follow up visits: After the  period, we usually recommend your baby be seen at 2 weeks of age, and then  at two months of age. Of course, we will see you anytime for any concerns and to follow up on any problems.     Thank you for entrusting your care to [facility]!    Also, check out “Children’s Health” on the [facility] Blog for updates on timely topics regarding children’s health!     Bladder wall thickening

## 2023-07-31 NOTE — ED ADULT NURSE NOTE - NSSEPSISSUSPECTED_ED_A_ED
Patient Refill Request     Last Office Visit:  12/30/22 with Dr. Graham Pickering     When they were supposed to follow up: ? Not listed on office note. Upcoming Office Visit: None     Refills Requested: Trelegy 100 mcg     Type of refill: 30 day     Requested Pharmacy: [address] [place] [address]     Is prescription pended?  Yes
No

## 2023-08-15 NOTE — ED ADULT NURSE NOTE - NS ED NURSE LEVEL OF CONSCIOUSNESS SPEECH
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 Diamond of anemia. WBC 2.71, Hgb 7.1, Plts  400. Prior to this visit, he was in [place] for a dental procedure. His symptoms of fatigue started after extractions. Workup by Dr. Diamond unremarkable.   4/26/23: Bone marrow biopsy revealed a hypercellular marrow (80-90%) with increased blasts (8-12%) concerning for MDS EB2. However, sample was limited.   5/23/23: Repeat bone marrow biopsy revealed myelodysplastic syndrome with excess blasts 2 (blasts 16-17%). 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 NGS with ASXL1 (44%), EZH2 (87%), IDH2 (42%), STAG2 (89%), U2AF1 (3%).  6/12/23: Cycle 1 of azacitidine 75 mg/m2 plus venetoclax x14 of 28 days.   7/3/23: Bone marrow biopsy at the end of cycle 1 revealed a hypocellular marrow, no blasts, trilineage hypoplasia and dyspoiesis with increased micromegakaryocytes. FISH with trisomy 8 (three copies of [id number]). NGS with ASXL1 (20%), EZH2 (40%), IDH2 (17%), STAG2 (38%).    History of Present Ilness:   Guillaume Salinas (Guillaume) is a pleasant 67 y.o.male with PVD, CAD, HTN who presents for follow up. He is tolerating treatment well. No new side effects or symptoms.     Patient was seen today in conjunction with a .    PAST MEDICAL HISTORY:   Past Medical History:   Diagnosis Date    Anticoagulant long-term use     Coronary artery disease     Hypertension     Peripheral vascular disease, unspecified        PAST SURGICAL HISTORY:   Past Surgical History:   Procedure Laterality Date    BONE MARROW BIOPSY Left 4/26/2023    Procedure: Biopsy-bone marrow;  Surgeon: Harry Diamond MD;  Location: [facility];  Service: Oncology;   Laterality: Left;    COLONOSCOPY N/A 2022    Procedure: COLONOSCOPY Golytely Vaccinated will bring cards;  Surgeon: Dereje Simon MD;  [facility];  Service: Endoscopy;  Laterality: N/A;  Do not cancel this order       PAST SOCIAL HISTORY:  Social History     Tobacco Use    Smoking status: Former     Current packs/day: 0.00     Average packs/day: 0.3 packs/day for 50.0 years (12.5 ttl pk-yrs)     Types: Cigarettes     Start date: 3/1/1973     Quit date: 3/1/2023     Years since quittin.4    Smokeless tobacco: Never   Substance Use Topics    Alcohol use: Not Currently    Drug use: Never       FAMILY HISTORY:  Family History   Problem Relation Age of Onset    Hypertension Mother     Cancer Father     Cancer Brother     No Known Problems Daughter     No Known Problems Daughter     No Known Problems Son        CURRENT MEDICATIONS:   Current Outpatient Medications   Medication Sig    acyclovir (ZOVIRAX) 400 MG tablet Take 1 tablet (400 mg total) by mouth 2 (two) times daily.    aspirin (ECOTRIN) 81 MG EC tablet Take 1 tablet (81 mg total) by mouth once daily.    atorvastatin (LIPITOR) 80 MG tablet Take 1 tablet (80 mg total) by mouth once daily.    carvediloL (COREG) 6.25 MG tablet TAKE 1 TABLET(6.25 MG) BY MOUTH TWICE DAILY WITH MEALS    cilostazoL (PLETAL) 50 MG Tab Take 1 tablet (50 mg total) by mouth 2 (two) times daily.    docusate sodium (COLACE) 100 MG capsule Take 100 mg by mouth 2 (two) times daily as needed for Constipation.    gabapentin (NEURONTIN) 300 MG capsule Take 1 capsule (300 mg total) by mouth 3 (three) times daily.    levoFLOXacin (LEVAQUIN) 500 MG tablet Take 1 tablet (500 mg total) by mouth once daily.    promethazine (PHENERGAN) 25 MG tablet Take 1 tablet (25 mg total) by mouth every 8 (eight) hours as needed for Nausea.    voriconazole (VFEND) 200 MG Tab Take 1 tablet (200 mg total) by mouth 2 (two) times daily.    zolpidem (AMBIEN) 10 mg Tab Take 10 mg by mouth  nightly as needed.    venetoclax (VENCLEXTA) 100 mg Tab Take 1 tablet (100 mg) by mouth once daily on days 1-7 of a 28-day cycle. Do not discard any remaining tablets.     No current facility-administered medications for this visit.       ALLERGIES:   Review of patient's allergies indicates:  No Known Allergies    Review of Systems:     Pertinent positives and negatives included in the HPI. Otherwise a complete review of systems is negative.    Physical Exam:     Vitals:    08/15/23 0822   BP: (!) 143/70   Pulse: 70   Temp: 97.7 °F (36.5 °C)     General: Appears well, NAD  Pulmonary: CTAB, no increased work of breathing, no W/R/C  Cardiovascular: S1S2 normal, RRR, no M/R/G  Abdominal: Soft, NT, ND, BS+, no HSM  Extremities: No C/C/E  Neurological: AAOx4, grossly normal, no focal deficits  Dermatologic: No appreciable rashes or lesions  Lymphatic: No cervical, axillary, or inguinal lymphadenopathy     ECOG Performance Status: (foot note - ECOG PS provided by Eastern Cooperative Oncology Group) 1 - Symptomatic but completely ambulatory    Karnofsky Performance Score:  80%- Normal Activity with Effort: Some Symptoms of Disease    Labs:   Lab Results   Component Value Date    WBC 2.28 (L) 08/10/2023    RBC 3.74 (L) 08/10/2023    HGB 12.2 (L) 08/10/2023    HCT 38.3 (L) 08/10/2023     (H) 08/10/2023    MCH 32.6 (H) 08/10/2023    MCHC 31.9 (L) 08/10/2023    RDW 24.1 (H) 08/10/2023     08/10/2023    MPV 10.2 08/10/2023    GRAN 0.4 (L) 08/10/2023    GRAN 18.0 (L) 08/10/2023    LYMPH 1.3 08/10/2023    LYMPH 57.0 (H) 08/10/2023    MONO 0.4 08/10/2023    MONO 16.2 (H) 08/10/2023    EOS 0.2 08/10/2023    BASO 0.05 08/10/2023    EOSINOPHIL 6.6 08/10/2023    BASOPHIL 2.2 (H) 08/10/2023       CMP  Sodium   Date Value Ref Range Status   08/10/2023 141 136 - 145 mmol/L Final     Potassium   Date Value Ref Range Status   08/10/2023 4.2 3.5 - 5.1 mmol/L Final     Chloride   Date Value Ref Range Status   08/10/2023 107 95  - 110 mmol/L Final     CO2   Date Value Ref Range Status   08/10/2023 30 (H) 23 - 29 mmol/L Final     Glucose   Date Value Ref Range Status   08/10/2023 110 70 - 110 mg/dL Final     BUN   Date Value Ref Range Status   08/10/2023 15 8 - 23 mg/dL Final     Creatinine   Date Value Ref Range Status   08/10/2023 1.1 0.5 - 1.4 mg/dL Final     Calcium   Date Value Ref Range Status   08/10/2023 9.3 8.7 - 10.5 mg/dL Final     Total Protein   Date Value Ref Range Status   08/10/2023 7.4 6.0 - 8.4 g/dL Final     Albumin   Date Value Ref Range Status   08/10/2023 4.0 3.5 - 5.2 g/dL Final     Total Bilirubin   Date Value Ref Range Status   08/10/2023 0.3 0.1 - 1.0 mg/dL Final     Comment:     For infants and newborns, interpretation of results should be based  on gestational age, weight and in agreement with clinical  observations.    Premature Infant recommended reference ranges:  Up to 24 hours.............<8.0 mg/dL  Up to 48 hours............<12.0 mg/dL  3-5 days..................<15.0 mg/dL  6-29 days.................<15.0 mg/dL       Alkaline Phosphatase   Date Value Ref Range Status   08/10/2023 81 55 - 135 U/L Final     AST   Date Value Ref Range Status   08/10/2023 18 10 - 40 U/L Final     ALT   Date Value Ref Range Status   08/10/2023 20 10 - 44 U/L Final     Anion Gap   Date Value Ref Range Status   08/10/2023 4 (L) 8 - 16 mmol/L Final     eGFR if    Date Value Ref Range Status   08/27/2021 >60 >60 mL/min/1.73 m^2 Final   08/27/2021 >60 >60 mL/min/1.73 m^2 Final   08/27/2021 >60 >60 mL/min/1.73 m^2 Final     eGFR if non    Date Value Ref Range Status   08/27/2021 57 (A) >60 mL/min/1.73 m^2 Final     Comment:     Calculation used to obtain the estimated glomerular filtration  rate (eGFR) is the CKD-EPI equation.      08/27/2021 57 (A) >60 mL/min/1.73 m^2 Final     Comment:     Calculation used to obtain the estimated glomerular filtration  rate (eGFR) is the CKD-EPI equation.       08/27/2021 57 (A) >60 mL/min/1.73 m^2 Final     Comment:     Calculation used to obtain the estimated glomerular filtration  rate (eGFR) is the CKD-EPI equation.          Imaging:   No results found for this or any previous visit (from the past 2160 hour(s)).    Pathology:  Reviewed     Assessment and Plan:   Guillaume Salinas (Guillaume) is a pleasant 67 y.o.male with PVD, CAD, HTN who presents for follow up.    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 NGS with ASXL1 (44%), EZH2 (87%), IDH2 (42%), STAG2 (89%), U2AF1 (3%). He started treatment with azacitidine 75 mg/m2 daily x7 days plus venetoclax 100mg (voriconazole) daily x14 of 28 days. Given cytopenias with last cycle, will decrease venetoclax to 7 days starting with cycle 3.  Continue azacitidine x5 days plus venetoclax x7 of 28 days as above. Taz dosing updated.  Prior to starting each cycle - ANC >500 and Plts >50.  Repeat bone marrow biopsy at the end of cycle 3. Orders placed. Consent signed. Declined Ativan.    Stem cell transplant candidate: We discussed the role for allogeneic stem cell transplantation in this disease process as a potentially curative option. We had an extensive discussion about the rationale, logistics, risks, and benefits. We reviewed the requirement to stay in the [place] area for 100 days with a caregiver at all times. We discussed the risks, including infection, graft failure, organ toxicity, graft versus host disease, relapse of disease, and secondary cancers. We reviewed the need for long-term immunosuppression and need for close monitoring. HCT-CI of 1 (intermediate risk). Will plan to proceed with transplant ASAP (as soon as a donor is identified). HLA typing sent.   Coordinator: Fay Stephens  Regimen: pending donor  Donor: pending  Graft source: pending donor    Stem cell donors: 10 siblings (9 are over age 65). One sister (age 63) - Radha.  Speaking Coherently Segundo Magdaleno (age 43), Padmini Rush (age 39), Susy (age 35).   Will type sister, children, and search for MUD.    Symptomatic anemia: Related to MDS. Twice weekly monitoring in [place]. Transfuse for Hgb <7.    Thrombocytopenia: Related to MDS. Monitor and transfuse for Plts <10.    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     At high risk for tumor lysis syndrome: Ok to stop allopurinol.     Orders/Follow Up:      Orders Placed This Encounter    venetoclax (VENCLEXTA) 100 mg Tab     Route Chart for Scheduling    BMT Chart Routing  Urgent    Follow up with physician 1 month. 1) Bone marrow biopsy 9/5 or 9/6. 2) Follow up 9/11/23   Follow up with [facility]    Provider visit type    Infusion scheduling note    Injection scheduling note Please move next azacitidine cycle to start 9/18 ([place])   Labs CBC, CMP, type and screen, magnesium and phosphorus   Scheduling:  Preferred lab:  Lab interval: once a week  weekly labs in [place] (Thurs). Also labs prior to visit on 9/11   Imaging None      Pharmacy appointment No pharmacy appointment needed      Other referrals no referral to Oncology Primary Care needed -  Schedule bone marrow biopsy  no additional referrals needed           Treatment Plan Information   OP AZACITIDINE 7-DAY (SUB-Q)   Harry Diamond MD   Upcoming Treatment Dates - OP AZACITIDINE 7-DAY (SUB-Q)    8/15/2023       Pre-Medications       ondansetron tablet 16 mg       Chemotherapy       azaCITIDine (VIDAZA) chemo injection 140 mg  8/16/2023       Pre-Medications       ondansetron tablet 16 mg       Chemotherapy       azaCITIDine (VIDAZA) chemo injection 140 mg  8/17/2023       Pre-Medications       ondansetron tablet 16 mg       Chemotherapy       azaCITIDine (VIDAZA) chemo injection 140 mg  8/18/2023       Pre-Medications       ondansetron tablet 16 mg       Chemotherapy       azaCITIDine (VIDAZA) chemo injection 140 mg    Advance Care Planning   Date:  05/08/2023  We reviewed his underlying diagnosis including natural history, prognosis, and various treatment options. He remains interested in pursuing any and all treatment options in an effort to improve his quality and quantity of life. Will discuss treatment options pending biopsy results.        Total time of this visit was 40 minutes, including time spent face to face with patient, and also in preparing for today's visit for MDM and documentation. (Medical Decision Making, including consideration of possible diagnoses, management options, complex medical record review, review of diagnostic tests and information, consideration and discussion of significant complications based on comorbidities, and discussion with providers involved with the care of the patient). Greater than 50% was spent face to face with the patient counseling and coordinating care.    Paco Hickey MD  Hematology, Oncology, and Stem Cell Transplantation  [facility]

## 2023-10-04 NOTE — PROGRESS NOTE ADULT - SUBJECTIVE AND OBJECTIVE BOX
[facility] Division of Hospital Medicine  Qian Hunt MD  Pager (M-F, 8A-5P): [phone]  Other Times:  [phone]    Patient is a 89y old  Male who presents with a chief complaint of 89F w/ fever and elevated liver enzymes (05 Feb 2020 08:03)      SUBJECTIVE / OVERNIGHT EVENTS: no acute events. s/p ERCP with sludge/stone removal  ADDITIONAL REVIEW OF SYSTEMS: no BM overnight. denies ab pain, SOB, cough.    MEDICATIONS  (STANDING):  bethanechol 10 milliGRAM(s) Oral three times a day  finasteride 5 milliGRAM(s) Oral daily  heparin  Injectable 5000 Unit(s) SubCutaneous every 8 hours  metoprolol tartrate 25 milliGRAM(s) Oral two times a day  piperacillin/tazobactam IVPB.. 3.375 Gram(s) IV Intermittent every 8 hours  tamsulosin 0.4 milliGRAM(s) Oral at bedtime    MEDICATIONS  (PRN):  haloperidol    Injectable 2.5 milliGRAM(s) IV Push every 6 hours PRN severe agitation      CAPILLARY BLOOD GLUCOSE        I&O's Summary    04 Feb 2020 07:01  -  05 Feb 2020 07:00  --------------------------------------------------------  IN: 120 mL / OUT: 0 mL / NET: 120 mL    05 Feb 2020 07:01  -  05 Feb 2020 11:00  --------------------------------------------------------  IN: 480 mL / OUT: 0 mL / NET: 480 mL        PHYSICAL EXAM:  Vital Signs Last 24 Hrs  T(C): 36.5 (05 Feb 2020 04:13), Max: 36.8 (04 Feb 2020 12:21)  T(F): 97.7 (05 Feb 2020 04:13), Max: 98.2 (04 Feb 2020 12:21)  HR: 70 (05 Feb 2020 04:13) (51 - 70)  BP: 122/76 (05 Feb 2020 04:13) (122/76 - 148/76)  BP(mean): --  RR: 18 (05 Feb 2020 04:13) (18 - 18)  SpO2: 92% (05 Feb 2020 04:13) (91% - 93%)    CONSTITUTIONAL: NAD, well-developed, well-groomed  NECK: Supple, no palpable masses; no thyromegaly  RESPIRATORY: Normal respiratory effort; lungs are clear to auscultation bilaterally  CARDIOVASCULAR: normal S1 and S2, no murmur/rub/gallop; No lower extremity edema  ABDOMEN: Nontender to palpation, normoactive bowel sounds, no rebound/guarding; No hepatosplenomegaly  MUSCULOSKELETAL:  no clubbing or cyanosis of digits; no joint swelling or tenderness to palpation  PSYCH: A+O to person, place(in Latvian); affect appropriate   SKIN: No rashes; no palpable lesions    LABS:                        13.4   7.80  )-----------( 171      ( 04 Feb 2020 07:08 )             43.5     02-05    139  |  102  |  23  ----------------------------<  126<H>  4.0   |  23  |  1.30    Ca    9.7      05 Feb 2020 06:02    TPro  6.0  /  Alb  3.3  /  TBili  2.3<H>  /  DBili  x   /  AST  198<H>  /  ALT  192<H>  /  AlkPhos  231<H>  02-05                RADIOLOGY & ADDITIONAL TESTS:  Results Reviewed:     < from: ERCP (02.04.20 @ 12:10) >  Impression:          - Billroth II anatomy and choledocholithiasis status post biliary                        sphincterotomy, balloon sweep, and removal of sludge and one stone.    < end of copied text >    Imaging Personally Reviewed:  Electrocardiogram Personally Reviewed:    COORDINATION OF CARE:  Care Discussed with Consultants/Other Providers [Y/N]: medicine NP  Prior or Outpatient Records Reviewed [Y/N]: O-T Advancement Flap Text: The defect edges were debeveled with a #15 scalpel blade.  Given the location of the defect, shape of the defect and the proximity to free margins an O-T advancement flap was deemed most appropriate.  Using a sterile surgical marker, an appropriate advancement flap was drawn incorporating the defect and placing the expected incisions within the relaxed skin tension lines where possible.    The area thus outlined was incised deep to adipose tissue with a #15 scalpel blade.  The skin margins were undermined to an appropriate distance in all directions utilizing iris scissors.

## 2024-10-30 NOTE — DISCHARGE NOTE PROVIDER - NSDCQMERRANDS_GEN_ALL_CORE
no eliquis will order heparin sub q Yes no eliquis will order heparin sub q no eliquis will order heparin sub q no eliquis will order heparin sub q - Home Eliquis for now. Unsure of indication. Please confirm home med no eliquis will order heparin sub q

## 2025-01-18 NOTE — PROGRESS NOTE ADULT - ASSESSMENT
Patient's most recent phosphorus results are listed below.   Recent Labs     01/18/25  0538   PHOS 2.0*     Plan  - Will treat hypophosphatemia with oral replacement  - Patient's hypophosphatemia is stable     89 yo male ho dm, htn, a/w acute [place]    pt s/p lap [place]  surg followup   pain control  incentive spirometry  ambulation  advance diet as tolerated  abx as per surg  drain mngt as per surg. to be removed prior to dc     hypoxia  cxr noted  no acute findings  incentive spirometry  supp o2  now resolved     DM  on insulin  monitor fs    failed TOV  cont chavez  alfuzosin as ordered     dvt ppx  dc planning

## 2025-03-12 NOTE — PROGRESS NOTE ADULT - PROBLEM SELECTOR PROBLEM 6
Price (Do Not Change): 0.00 Instructions: This plan will send the code FBSE to the PM system.  DO NOT or CHANGE the price. Detail Level: Simple Type 2 diabetes mellitus without complication, without long-term current use of insulin